# Patient Record
Sex: MALE | Race: WHITE | NOT HISPANIC OR LATINO | URBAN - METROPOLITAN AREA
[De-identification: names, ages, dates, MRNs, and addresses within clinical notes are randomized per-mention and may not be internally consistent; named-entity substitution may affect disease eponyms.]

---

## 2018-03-29 ENCOUNTER — INPATIENT (INPATIENT)
Facility: HOSPITAL | Age: 83
LOS: 7 days | Discharge: HOME HEALTH | DRG: 470 | End: 2018-04-06
Attending: INTERNAL MEDICINE | Admitting: INTERNAL MEDICINE
Payer: MEDICARE

## 2018-03-29 ENCOUNTER — RESULT REVIEW (OUTPATIENT)
Age: 83
End: 2018-03-29

## 2018-03-29 ENCOUNTER — TRANSCRIPTION ENCOUNTER (OUTPATIENT)
Age: 83
End: 2018-03-29

## 2018-03-29 VITALS
SYSTOLIC BLOOD PRESSURE: 188 MMHG | DIASTOLIC BLOOD PRESSURE: 88 MMHG | HEART RATE: 82 BPM | WEIGHT: 130.07 LBS | TEMPERATURE: 98 F | RESPIRATION RATE: 17 BRPM | HEIGHT: 66 IN

## 2018-03-29 DIAGNOSIS — I10 ESSENTIAL (PRIMARY) HYPERTENSION: ICD-10-CM

## 2018-03-29 DIAGNOSIS — S72.009A FRACTURE OF UNSPECIFIED PART OF NECK OF UNSPECIFIED FEMUR, INITIAL ENCOUNTER FOR CLOSED FRACTURE: ICD-10-CM

## 2018-03-29 DIAGNOSIS — E78.5 HYPERLIPIDEMIA, UNSPECIFIED: ICD-10-CM

## 2018-03-29 DIAGNOSIS — I25.10 ATHEROSCLEROTIC HEART DISEASE OF NATIVE CORONARY ARTERY WITHOUT ANGINA PECTORIS: ICD-10-CM

## 2018-03-29 DIAGNOSIS — N18.9 CHRONIC KIDNEY DISEASE, UNSPECIFIED: ICD-10-CM

## 2018-03-29 DIAGNOSIS — Z98.89 OTHER SPECIFIED POSTPROCEDURAL STATES: Chronic | ICD-10-CM

## 2018-03-29 DIAGNOSIS — E11.9 TYPE 2 DIABETES MELLITUS WITHOUT COMPLICATIONS: ICD-10-CM

## 2018-03-29 DIAGNOSIS — Z29.9 ENCOUNTER FOR PROPHYLACTIC MEASURES, UNSPECIFIED: ICD-10-CM

## 2018-03-29 LAB
ALBUMIN SERPL ELPH-MCNC: 3.4 G/DL — LOW (ref 3.5–5)
ALP SERPL-CCNC: 105 U/L — SIGNIFICANT CHANGE UP (ref 40–120)
ALT FLD-CCNC: 13 U/L DA — SIGNIFICANT CHANGE UP (ref 10–60)
ANION GAP SERPL CALC-SCNC: 11 MMOL/L — SIGNIFICANT CHANGE UP (ref 5–17)
APPEARANCE UR: CLEAR — SIGNIFICANT CHANGE UP
APTT BLD: 32.2 SEC — SIGNIFICANT CHANGE UP (ref 27.5–37.4)
AST SERPL-CCNC: 14 U/L — SIGNIFICANT CHANGE UP (ref 10–40)
BILIRUB SERPL-MCNC: 0.4 MG/DL — SIGNIFICANT CHANGE UP (ref 0.2–1.2)
BILIRUB UR-MCNC: NEGATIVE — SIGNIFICANT CHANGE UP
BUN SERPL-MCNC: 62 MG/DL — HIGH (ref 7–18)
CALCIUM SERPL-MCNC: 8.7 MG/DL — SIGNIFICANT CHANGE UP (ref 8.4–10.5)
CHLORIDE SERPL-SCNC: 114 MMOL/L — HIGH (ref 96–108)
CO2 SERPL-SCNC: 16 MMOL/L — LOW (ref 22–31)
COLOR SPEC: YELLOW — SIGNIFICANT CHANGE UP
CREAT SERPL-MCNC: 3.05 MG/DL — HIGH (ref 0.5–1.3)
DIFF PNL FLD: ABNORMAL
GLUCOSE SERPL-MCNC: 88 MG/DL — SIGNIFICANT CHANGE UP (ref 70–99)
GLUCOSE UR QL: NEGATIVE — SIGNIFICANT CHANGE UP
HCT VFR BLD CALC: 32.5 % — LOW (ref 39–50)
HGB BLD-MCNC: 10.8 G/DL — LOW (ref 13–17)
INR BLD: 1.03 RATIO — SIGNIFICANT CHANGE UP (ref 0.88–1.16)
KETONES UR-MCNC: NEGATIVE — SIGNIFICANT CHANGE UP
LEUKOCYTE ESTERASE UR-ACNC: ABNORMAL
LIDOCAIN IGE QN: 56 U/L — LOW (ref 73–393)
LYMPHOCYTES # BLD AUTO: 14 % — SIGNIFICANT CHANGE UP (ref 13–44)
MCHC RBC-ENTMCNC: 31.7 PG — SIGNIFICANT CHANGE UP (ref 27–34)
MCHC RBC-ENTMCNC: 33.2 GM/DL — SIGNIFICANT CHANGE UP (ref 32–36)
MCV RBC AUTO: 95.5 FL — SIGNIFICANT CHANGE UP (ref 80–100)
MONOCYTES NFR BLD AUTO: 3 % — SIGNIFICANT CHANGE UP (ref 2–14)
NEUTROPHILS NFR BLD AUTO: 83 % — HIGH (ref 43–77)
NITRITE UR-MCNC: NEGATIVE — SIGNIFICANT CHANGE UP
OSMOLALITY UR: 447 MOS/KG — SIGNIFICANT CHANGE UP (ref 50–1200)
PH UR: 5 — SIGNIFICANT CHANGE UP (ref 5–8)
PLATELET # BLD AUTO: 127 K/UL — LOW (ref 150–400)
POTASSIUM SERPL-MCNC: 4.5 MMOL/L — SIGNIFICANT CHANGE UP (ref 3.5–5.3)
POTASSIUM SERPL-SCNC: 4.5 MMOL/L — SIGNIFICANT CHANGE UP (ref 3.5–5.3)
PROT ?TM UR-MCNC: 159 MG/DL — HIGH (ref 0–12)
PROT SERPL-MCNC: 7.1 G/DL — SIGNIFICANT CHANGE UP (ref 6–8.3)
PROT UR-MCNC: 100
PROTHROM AB SERPL-ACNC: 11.2 SEC — SIGNIFICANT CHANGE UP (ref 9.8–12.7)
RBC # BLD: 3.4 M/UL — LOW (ref 4.2–5.8)
RBC # FLD: 13.7 % — SIGNIFICANT CHANGE UP (ref 10.3–14.5)
SODIUM SERPL-SCNC: 141 MMOL/L — SIGNIFICANT CHANGE UP (ref 135–145)
SODIUM UR-SCNC: 69 MMOL/L — SIGNIFICANT CHANGE UP (ref 40–220)
SP GR SPEC: 1.01 — SIGNIFICANT CHANGE UP (ref 1.01–1.02)
UROBILINOGEN FLD QL: NEGATIVE — SIGNIFICANT CHANGE UP
WBC # BLD: 9.3 K/UL — SIGNIFICANT CHANGE UP (ref 3.8–10.5)
WBC # FLD AUTO: 9.3 K/UL — SIGNIFICANT CHANGE UP (ref 3.8–10.5)

## 2018-03-29 PROCEDURE — 99285 EMERGENCY DEPT VISIT HI MDM: CPT

## 2018-03-29 PROCEDURE — 88305 TISSUE EXAM BY PATHOLOGIST: CPT | Mod: 26

## 2018-03-29 PROCEDURE — 73502 X-RAY EXAM HIP UNI 2-3 VIEWS: CPT | Mod: 26,RT

## 2018-03-29 PROCEDURE — 88311 DECALCIFY TISSUE: CPT | Mod: 26

## 2018-03-29 PROCEDURE — 71045 X-RAY EXAM CHEST 1 VIEW: CPT | Mod: 26

## 2018-03-29 RX ORDER — MORPHINE SULFATE 50 MG/1
1 CAPSULE, EXTENDED RELEASE ORAL EVERY 4 HOURS
Qty: 0 | Refills: 0 | Status: DISCONTINUED | OUTPATIENT
Start: 2018-03-29 | End: 2018-03-30

## 2018-03-29 RX ORDER — AMLODIPINE BESYLATE 2.5 MG/1
10 TABLET ORAL DAILY
Qty: 0 | Refills: 0 | Status: DISCONTINUED | OUTPATIENT
Start: 2018-03-29 | End: 2018-03-30

## 2018-03-29 RX ORDER — ISOSORBIDE MONONITRATE 60 MG/1
30 TABLET, EXTENDED RELEASE ORAL DAILY
Qty: 0 | Refills: 0 | Status: DISCONTINUED | OUTPATIENT
Start: 2018-03-29 | End: 2018-03-30

## 2018-03-29 RX ORDER — MORPHINE SULFATE 50 MG/1
2 CAPSULE, EXTENDED RELEASE ORAL ONCE
Qty: 0 | Refills: 0 | Status: DISCONTINUED | OUTPATIENT
Start: 2018-03-29 | End: 2018-03-29

## 2018-03-29 RX ORDER — ATORVASTATIN CALCIUM 80 MG/1
40 TABLET, FILM COATED ORAL AT BEDTIME
Qty: 0 | Refills: 0 | Status: DISCONTINUED | OUTPATIENT
Start: 2018-03-29 | End: 2018-03-30

## 2018-03-29 RX ORDER — INSULIN GLARGINE 100 [IU]/ML
20 INJECTION, SOLUTION SUBCUTANEOUS EVERY MORNING
Qty: 0 | Refills: 0 | Status: DISCONTINUED | OUTPATIENT
Start: 2018-03-29 | End: 2018-03-30

## 2018-03-29 RX ORDER — PANTOPRAZOLE SODIUM 20 MG/1
40 TABLET, DELAYED RELEASE ORAL
Qty: 0 | Refills: 0 | Status: DISCONTINUED | OUTPATIENT
Start: 2018-03-29 | End: 2018-03-30

## 2018-03-29 RX ORDER — METOPROLOL TARTRATE 50 MG
25 TABLET ORAL
Qty: 0 | Refills: 0 | Status: DISCONTINUED | OUTPATIENT
Start: 2018-03-29 | End: 2018-03-30

## 2018-03-29 RX ORDER — TAMSULOSIN HYDROCHLORIDE 0.4 MG/1
0.4 CAPSULE ORAL AT BEDTIME
Qty: 0 | Refills: 0 | Status: DISCONTINUED | OUTPATIENT
Start: 2018-03-29 | End: 2018-03-30

## 2018-03-29 RX ORDER — MORPHINE SULFATE 50 MG/1
2 CAPSULE, EXTENDED RELEASE ORAL EVERY 6 HOURS
Qty: 0 | Refills: 0 | Status: DISCONTINUED | OUTPATIENT
Start: 2018-03-29 | End: 2018-03-30

## 2018-03-29 RX ORDER — CITALOPRAM 10 MG/1
20 TABLET, FILM COATED ORAL DAILY
Qty: 0 | Refills: 0 | Status: DISCONTINUED | OUTPATIENT
Start: 2018-03-29 | End: 2018-03-30

## 2018-03-29 RX ORDER — FERROUS SULFATE 325(65) MG
325 TABLET ORAL DAILY
Qty: 0 | Refills: 0 | Status: DISCONTINUED | OUTPATIENT
Start: 2018-03-29 | End: 2018-03-30

## 2018-03-29 RX ORDER — ATENOLOL 25 MG/1
25 TABLET ORAL DAILY
Qty: 0 | Refills: 0 | Status: DISCONTINUED | OUTPATIENT
Start: 2018-03-29 | End: 2018-03-29

## 2018-03-29 RX ORDER — ASPIRIN/CALCIUM CARB/MAGNESIUM 324 MG
81 TABLET ORAL DAILY
Qty: 0 | Refills: 0 | Status: DISCONTINUED | OUTPATIENT
Start: 2018-03-29 | End: 2018-03-30

## 2018-03-29 RX ORDER — RANOLAZINE 500 MG/1
500 TABLET, FILM COATED, EXTENDED RELEASE ORAL
Qty: 0 | Refills: 0 | Status: DISCONTINUED | OUTPATIENT
Start: 2018-03-29 | End: 2018-03-30

## 2018-03-29 RX ORDER — ACETAMINOPHEN 500 MG
650 TABLET ORAL EVERY 6 HOURS
Qty: 0 | Refills: 0 | Status: DISCONTINUED | OUTPATIENT
Start: 2018-03-29 | End: 2018-03-30

## 2018-03-29 RX ORDER — DOCUSATE SODIUM 100 MG
100 CAPSULE ORAL THREE TIMES A DAY
Qty: 0 | Refills: 0 | Status: DISCONTINUED | OUTPATIENT
Start: 2018-03-29 | End: 2018-03-30

## 2018-03-29 RX ORDER — HEPARIN SODIUM 5000 [USP'U]/ML
5000 INJECTION INTRAVENOUS; SUBCUTANEOUS EVERY 12 HOURS
Qty: 0 | Refills: 0 | Status: DISCONTINUED | OUTPATIENT
Start: 2018-03-29 | End: 2018-03-30

## 2018-03-29 RX ORDER — INSULIN LISPRO 100/ML
VIAL (ML) SUBCUTANEOUS
Qty: 0 | Refills: 0 | Status: DISCONTINUED | OUTPATIENT
Start: 2018-03-29 | End: 2018-03-30

## 2018-03-29 RX ORDER — MORPHINE SULFATE 50 MG/1
4 CAPSULE, EXTENDED RELEASE ORAL ONCE
Qty: 0 | Refills: 0 | Status: DISCONTINUED | OUTPATIENT
Start: 2018-03-29 | End: 2018-03-29

## 2018-03-29 RX ORDER — FINASTERIDE 5 MG/1
5 TABLET, FILM COATED ORAL DAILY
Qty: 0 | Refills: 0 | Status: DISCONTINUED | OUTPATIENT
Start: 2018-03-29 | End: 2018-03-30

## 2018-03-29 RX ORDER — RANOLAZINE 500 MG/1
500 TABLET, FILM COATED, EXTENDED RELEASE ORAL
Qty: 0 | Refills: 0 | Status: DISCONTINUED | OUTPATIENT
Start: 2018-03-29 | End: 2018-03-29

## 2018-03-29 RX ADMIN — Medication 25 MILLIGRAM(S): at 18:54

## 2018-03-29 RX ADMIN — TAMSULOSIN HYDROCHLORIDE 0.4 MILLIGRAM(S): 0.4 CAPSULE ORAL at 22:50

## 2018-03-29 RX ADMIN — RANOLAZINE 500 MILLIGRAM(S): 500 TABLET, FILM COATED, EXTENDED RELEASE ORAL at 18:54

## 2018-03-29 RX ADMIN — MORPHINE SULFATE 2 MILLIGRAM(S): 50 CAPSULE, EXTENDED RELEASE ORAL at 10:09

## 2018-03-29 RX ADMIN — MORPHINE SULFATE 2 MILLIGRAM(S): 50 CAPSULE, EXTENDED RELEASE ORAL at 10:39

## 2018-03-29 RX ADMIN — HEPARIN SODIUM 5000 UNIT(S): 5000 INJECTION INTRAVENOUS; SUBCUTANEOUS at 18:54

## 2018-03-29 RX ADMIN — Medication 100 MILLIGRAM(S): at 22:50

## 2018-03-29 RX ADMIN — ATORVASTATIN CALCIUM 40 MILLIGRAM(S): 80 TABLET, FILM COATED ORAL at 22:50

## 2018-03-29 NOTE — ED PROVIDER NOTE - MEDICAL DECISION MAKING DETAILS
89 y/o M pt presents with R hip fracture. Plan for pain control, X-Ray of the R hip, and admit to medicine for orthopedic repair. 89 y/o M pt presents with R hip fracture. Plan for pain control, X-Ray of the R hip, and admit to medicine for medical clearance for orthopedic repair.

## 2018-03-29 NOTE — ED ADULT TRIAGE NOTE - CHIEF COMPLAINT QUOTE
right hip pain, (+) shortening, s/p fall last night. Patient started vomiting in the ambulance as per [place]. Patient on plavix

## 2018-03-29 NOTE — ED PROVIDER NOTE - OBJECTIVE STATEMENT
89 y/o M pt with PMHx of HTN, DM, CAD, L Hip Fracture (s/p repair), and HLD and PSHx of Hip Surgery (performed by Dr. Sosa x2 years ago) presents to ED c/o R hip pain s/p mechanical slip and fall last night (yesterday). Pt states that while he was getting off of the couch last night, pt slipped and fell on his R hip, sustaining an injury to the R hip; pt now suspects a R hip fracture. Pt reports he decided not to come to the hospital until this morning because he did not want to disturb his son last night. Pt notes vomiting since yesterday as well, which pt attributes to having eaten "bad fish". Per pt, pt fractured his L hip x2 years ago, and had it repaired at [facility] by Dr. Sosa. Pt denies head trauma, LOC, numbness, tingling, or any other complaints. NKDA.

## 2018-03-29 NOTE — CONSULT NOTE ADULT - ASSESSMENT
89 yr old male with PMHX of CAD,HTN,Lipid D/O,Anemia,CRI,DM who presents s/p fall with RT hip pain.  1.Echocardiogram.  2.Orthopedics eval.  3.HTN and CAD-asa,lopressor.  4.CRI-F/U lytes.  5.DM-Insulin.  6.Pain control.  7.GI and DVT prophylaxis.

## 2018-03-29 NOTE — CONSULT NOTE ADULT - SUBJECTIVE AND OBJECTIVE BOX
MARCELLA NIEVES  MRN-[id number]     ORTHOPEDIC CONSULT    Diagnosis:  ____ Hip Femoral Neck Fracture    89yMaleHPI:  Patient is a 89M from home, AAOx3, lives with wife, ambulates independently, wife was not at bedside at time of consult for [place]an translation, with PMH HTN, DM, CAD, CABG 7 years ago, L Hip Fracture 8 years ago (s/p repair), and HLD presenting to the ED s/p mechanical fall over couch yesterday night 11pm, falling onto his R hip. Patient states he was not able to get up or ambulate s/p fall due to Right hip pain. He indicates the pain is controlled while he is laying in bed and moving makes the pain worse. Denies fever, chills, SOB, palpitations, chest pain, nausea, vomiting, diarrhea or constipation, only ROS + R hip pain s/p fracture. Denies syncope, LOC or head injury at time of fall.    PMH: as per HPI  Surgical Hx: L hip surgery  Fam Hx: mother and father- DM  Social Hx: neg for smoking or etoh  Allergies: NKDA (29 Mar 2018 14:30)      Patient was seen and evaluated at bedside. Patient with no acute complaints other than right hip pain.   Pain is controlled.  Denies CP/SOB, dyspnea, paresthesias, N/V/D, palpitations.     Vital Signs Last 24 Hrs  T(C): 37.1 (29 Mar 2018 16:24), Max: 37.1 (29 Mar 2018 16:24)  T(F): 98.7 (29 Mar 2018 16:24), Max: 98.7 (29 Mar 2018 16:24)  HR: 74 (29 Mar 2018 16:24) (74 - 82)  BP: 139/54 (29 Mar 2018 16:24) (139/54 - 188/88)  BP(mean): --  RR: 16 (29 Mar 2018 16:24) (16 - 17)  SpO2: 97% (29 Mar 2018 16:24) (97% - 97%)  I&O's Detail      Physical Exam:    General: AAOx3, NAD, resting comfortably in bed.     Hip:   Skin is pink and warm with some ecchymosis. Tender at the fracture site. Decreased ROM of the affected hip due to pain. SILT. Positive logroll test.  Lower extremity:  No calf tenderness, calves are soft. 2+pulses. NVI. 5/5 Strength of EHL/TA/gastrocnemius B/L.  Good capillary refill. Warm, well-perfused.                           10.8   9.3   )-----------( 127      ( 29 Mar 2018 10:06 )             32.5     03-29    141  |  114<H>  |  62<H>  ----------------------------<  88  4.5   |  16<L>  |  3.05<H>    Ca    8.7      29 Mar 2018 10:06    TPro  7.1  /  Alb  3.4<L>  /  TBili  0.4  /  DBili  x   /  AST  14  /  ALT  13  /  AlkPhos  105  03-29    PT/INR - ( 29 Mar 2018 10:06 )   PT: 11.2 sec;   INR: 1.03 ratio         PTT - ( 29 Mar 2018 10:06 )  PTT:32.2 sec      Impression:  89yMale with Right Hip Femoral Neck Fracture     Plan:  -  Pain management  -  Dvt prophylaxis with Venodynes  -  Keep NPO after midnight tonight  -  Surgeon:  Dr. Sosa  -  Procedure:  right hip david arthroplasty  -  For Surgery on 3/30/18  -  Medical Optimization  -  Consent: Pending  -  Case d/w DR. Sosa  -  Fracture care with bedrest now, NWB of the affected extremity MARCELLA NIEVES  MRN-[id number]     ORTHOPEDIC CONSULT    Diagnosis:  Right Hip Femoral Neck Fracture    89yMaleHPI:  Patient is a 89M from home, AAOx3, lives with wife, ambulates independently, wife was not at bedside at time of consult for [place] translation, with PMH HTN, DM, CAD, CABG 7 years ago, L Hip Fracture 8 years ago (s/p repair), and HLD presenting to the ED s/p mechanical fall over couch yesterday night 11pm, falling onto his R hip. Patient states he was not able to get up or ambulate s/p fall due to Right hip pain. He indicates the pain is controlled while he is laying in bed and moving makes the pain worse. Denies fever, chills, SOB, palpitations, chest pain, nausea, vomiting, diarrhea or constipation, only ROS + R hip pain s/p fracture. Denies syncope, LOC or head injury at time of fall.    PMH: as per HPI  Surgical Hx: L hip surgery  Fam Hx: mother and father- DM  Social Hx: neg for smoking or etoh  Allergies: NKDA (29 Mar 2018 14:30)      Patient was seen and evaluated at bedside. Patient with no acute complaints other than right hip pain.   Pain is controlled.  Denies CP/SOB, dyspnea, paresthesias, N/V/D, palpitations.     Vital Signs Last 24 Hrs  T(C): 37.1 (29 Mar 2018 16:24), Max: 37.1 (29 Mar 2018 16:24)  T(F): 98.7 (29 Mar 2018 16:24), Max: 98.7 (29 Mar 2018 16:24)  HR: 74 (29 Mar 2018 16:24) (74 - 82)  BP: 139/54 (29 Mar 2018 16:24) (139/54 - 188/88)  BP(mean): --  RR: 16 (29 Mar 2018 16:24) (16 - 17)  SpO2: 97% (29 Mar 2018 16:24) (97% - 97%)  I&O's Detail      Physical Exam:    General: AAOx3, NAD, resting comfortably in bed.    Right Hip:   Skin is pink and warm with some ecchymosis. Tender at the fracture site. Decreased ROM of the affected hip due to pain. SILT. Positive logroll test.  Lower extremity:  No calf tenderness, calves are soft. 2+pulses. NVI. 5/5 Strength of EHL/TA/gastrocnemius B/L.  Good capillary refill. Warm, well-perfused.                           10.8   9.3   )-----------( 127      ( 29 Mar 2018 10:06 )             32.5     03-29    141  |  114<H>  |  62<H>  ----------------------------<  88  4.5   |  16<L>  |  3.05<H>    Ca    8.7      29 Mar 2018 10:06    TPro  7.1  /  Alb  3.4<L>  /  TBili  0.4  /  DBili  x   /  AST  14  /  ALT  13  /  AlkPhos  105  03-29    PT/INR - ( 29 Mar 2018 10:06 )   PT: 11.2 sec;   INR: 1.03 ratio         PTT - ( 29 Mar 2018 10:06 )  PTT:32.2 sec    < from: Xray Hip w/ Pelvis 2 or 3 Views, Right (03.29.18 @ 10:56) >    EXAM:  XR HIP WITH PELV 2-3V RT                            PROCEDURE DATE:  03/29/2018          INTERPRETATION:  X-rays of the right hip and pelvis    Indication: Right hip pain from a fall.    2 views of the right hip and single AP view of the pelvis are compared to   a previous examination dated 12/27/2015.    Impression: New acute right femoral neck fracture with mild angulation.   This finding is communicated with the emergency department via the PACS   communication system.    No dislocation.    Left hip screw/pins are present.    The hip joint spaces are preserved bilaterally.                ROMIE BROCK M.D., ATTENDING RADIOLOGIST  This document has been electronically signed. Mar 29 2018 11:00AM                < end of copied text >      Impression:  89yMale with Right Hip Femoral Neck Fracture     Plan:  -  Pain management  -  Dvt prophylaxis with Venodynes. On hep sq for dvt.  -  Keep NPO after midnight tonight  -  Surgeon:  Dr. Sosa  -  Procedure:  right hip david arthroplasty  -  For Surgery on 3/30/18  -  Medical Optimization  -  Consent: Pending  -  Case d/w DR. Sosa  -  Fracture care with bedrest now, NWB of the affected extremity

## 2018-03-29 NOTE — CONSULT NOTE ADULT - SUBJECTIVE AND OBJECTIVE BOX
HPI:  Patient is a 89M from home, AAOx3, lives with wife, ambulates independently, wife at bedside for [place] translation, with PMH HTN, DM, CAD, CABG 7 years ago, L Hip Fracture 8 years ago (s/p repair), and HLD presenting to the ED s/p mechanical fall over couch yesterday night 11pm, falling onto his R hip. Denies fever, chills, SOB, palpitations, chest pain, nausea, vomiting, diarrhea or constipation, only ROS + R hip pain s/p fracture. Denies syncope, LOC or head injury at time of fall.  h/o diabetes on glimepiride/lantus  ?wt change  ?hypoglycemia  PMH: as per HPI  Surgical Hx: L hip surgery  Fam Hx: mother and father- DM  Social Hx: neg for smoking or etoh  Allergies: NKDA (29 Mar 2018 14:30)      PAST MEDICAL & SURGICAL HISTORY:  HLD (hyperlipidemia)  Hip fracture, left  CAD (coronary artery disease): s/p by pass surgery  Diabetes  Hypertension  History of hip surgery      No Known Allergies      acetaminophen   Tablet. 650 milliGRAM(s) Oral every 6 hours PRN  amLODIPine   Tablet 10 milliGRAM(s) Oral daily  aspirin enteric coated 81 milliGRAM(s) Oral daily  atorvastatin 40 milliGRAM(s) Oral at bedtime  citalopram 20 milliGRAM(s) Oral daily  docusate sodium 100 milliGRAM(s) Oral three times a day  ferrous    sulfate 325 milliGRAM(s) Oral daily  finasteride 5 milliGRAM(s) Oral daily  heparin  Injectable 5000 Unit(s) SubCutaneous every 12 hours  insulin glargine Injectable (LANTUS) 20 Unit(s) SubCutaneous every morning  isosorbide   mononitrate ER Tablet (IMDUR) 30 milliGRAM(s) Oral daily  metoprolol tartrate 25 milliGRAM(s) Oral two times a day  morphine  - Injectable 1 milliGRAM(s) IV Push every 4 hours PRN  morphine  - Injectable 2 milliGRAM(s) IV Push every 6 hours PRN  pantoprazole    Tablet 40 milliGRAM(s) Oral before breakfast  ranolazine 500 milliGRAM(s) Oral two times a day  tamsulosin 0.4 milliGRAM(s) Oral at bedtime      Social Hx:    FAMILY HISTORY:  No pertinent family history in first degree relatives      REVIEW OF SYSTEMS:  cannot get details  preet diet  no vomiting  CONSTITUTIONAL: No weakness, fevers or chills  EYES/ENT: No visual changes;  No vertigo or throat pain   NECK: No pain or stiffness  RESPIRATORY: No cough, wheezing, hemoptysis; No shortness of breath  CARDIOVASCULAR: No chest pain or palpitations  GASTROINTESTINAL: No abdominal or epigastric pain. No nausea, vomiting, or hematemesis; No diarrhea or constipation. No melena or hematochezia.  GENITOURINARY: No dysuria, frequency or hematuria  NEUROLOGICAL: No numbness or weakness  SKIN: No itching, burning, rashes, or lesions   All other review of systems is negative unless indicated above.  PHYSICAL EXAM:    Vital Signs Last 24 Hrs  T(C): 37.1 (29 Mar 2018 16:24), Max: 37.1 (29 Mar 2018 16:24)  T(F): 98.7 (29 Mar 2018 16:24), Max: 98.7 (29 Mar 2018 16:24)  HR: 74 (29 Mar 2018 16:24) (74 - 82)  BP: 139/54 (29 Mar 2018 16:24) (139/54 - 188/88)  BP(mean): --  RR: 16 (29 Mar 2018 16:24) (16 - 17)  SpO2: 97% (29 Mar 2018 16:24) (97% - 97%)    Constitutional:    HEENT:elderly male  awake  lungs ae fair  cardia reg  abd soft    Neck:  [  ] Supple  [  ]Lymphadenopathy  [  ] JVD   [  ]No JVD  [  ] Masses   [  ] WNL    CHEST/Respiratory:  [  ] Rales      [  ] Rhonchi      [  ] Wheezing       [  ] Chest Tenderness  [  ]Clear to auscultation    Cardiovascular:  [  ]S1 S2  [  ] Reg  [  ] Irreg   [  ] Murmurs  [  ]Systolic [  ]Diastolic  [  ]No Murmur    Abdomen:  [  ]  Bowel Sounds   [  ] Soft           [  ] ABD Distention  [  ] Tenderness  [  ] Organomegaly                        [  ] Guarding Rigidity  [  ] No Guarding Rigidity  [  ] Rebound Tenderness [  ] No Rebound Tenderness    Extremities: [  ] Edema  [  ] No edema  [  ] Clubbing   [  ] Cyanosis  [  ] Palpable peripheral pulses                        [  ] No Tender Calf muscles  [  ] Tender Calf muscles    Neurological:  [  ] Alert  [  ] Awake  [  ] Oriented  x                              [  ] Confused    Skin:  [  ] Thrombophlebitis  [  ] Rashes  [  ] Dry  [  ] Ulcers    Ortho:  [  ] Joint Swelling  [  ] Joint erythema [  ] DJD [  ] Increased temp. to touch      LABS/DIAGNOSTIC TESTS                          10.8   9.3   )-----------( 127      ( 29 Mar 2018 10:06 )             32.5     WBC Count: 9.3 K/uL (03-29 @ 10:06)      03-29    141  |  114<H>  |  62<H>  ----------------------------<  88  4.5   |  16<L>  |  3.05<H>    Ca    8.7      29 Mar 2018 10:06    TPro  7.1  /  Alb  3.4<L>  /  TBili  0.4  /  DBili  x   /  AST  14  /  ALT  13  /  AlkPhos  105  03-29          LIVER FUNCTIONS - ( 29 Mar 2018 10:06 )  Alb: 3.4 g/dL / Pro: 7.1 g/dL / ALK PHOS: 105 U/L / ALT: 13 U/L DA / AST: 14 U/L / GGT: x             PT/INR - ( 29 Mar 2018 10:06 )   PT: 11.2 sec;   INR: 1.03 ratio         PTT - ( 29 Mar 2018 10:06 )  PTT:32.2 sec    LACTATE:      CULTURES:   acetaminophen   Tablet. 650 milliGRAM(s) Oral every 6 hours PRN  amLODIPine   Tablet 10 milliGRAM(s) Oral daily  aspirin enteric coated 81 milliGRAM(s) Oral daily  atorvastatin 40 milliGRAM(s) Oral at bedtime  citalopram 20 milliGRAM(s) Oral daily  docusate sodium 100 milliGRAM(s) Oral three times a day  ferrous    sulfate 325 milliGRAM(s) Oral daily  finasteride 5 milliGRAM(s) Oral daily  heparin  Injectable 5000 Unit(s) SubCutaneous every 12 hours  insulin glargine Injectable (LANTUS) 20 Unit(s) SubCutaneous every morning  isosorbide   mononitrate ER Tablet (IMDUR) 30 milliGRAM(s) Oral daily  metoprolol tartrate 25 milliGRAM(s) Oral two times a day  morphine  - Injectable 1 milliGRAM(s) IV Push every 4 hours PRN  morphine  - Injectable 2 milliGRAM(s) IV Push every 6 hours PRN  pantoprazole    Tablet 40 milliGRAM(s) Oral before breakfast  ranolazine 500 milliGRAM(s) Oral two times a day  tamsulosin 0.4 milliGRAM(s) Oral at bedtime      RADIOLOGY    CXR:

## 2018-03-29 NOTE — ED ADULT NURSE NOTE - LANGUAGE ASSISTANCE NEEDED
Yes-Patient/Caregiver accepts free interpretation services.../son at [facility]

## 2018-03-29 NOTE — H&P ADULT - PROBLEM SELECTOR PLAN 5
f/u HbA1C  holding glimeperide  HSS  monitor FS ac hs  diabetic diet f/u HbA1C  holding glimeperide  reducing AM lantus 40 units --> 20 units while inpatient  HSS  monitor FS ac hs  diabetic diet  Dr. Allen- endo

## 2018-03-29 NOTE — H&P ADULT - PROBLEM SELECTOR PLAN 7
IMPROVE VTE Individual Risk Assessment          RISK                                                          Points  [  ] Previous VTE                                                3  [  ] Thrombophilia                                             2  [  ] Lower limb paralysis                                   2        (unable to hold up >15 seconds)    [  ] Current Cancer                                             2         (within 6 months)  [ x ] Immobilization > 24 hrs                              1  [  ] ICU/CCU stay > 24 hours                             1  [x  ] Age > 60                                                         1    IMPROVE VTE Score: 2, heparin sq for dvt ppx

## 2018-03-29 NOTE — H&P ADULT - PROBLEM SELECTOR PLAN 2
Hx CABG 7 years ago  holding plavix as pre-op  resuming asa 81 and ranexa 500mg BID  cardio eval- Dr. Johnson  f/u echo

## 2018-03-29 NOTE — H&P ADULT - PROBLEM SELECTOR PLAN 1
s/p mechanical fall yesterday night  R hip X ray shows acute right femoral neck fracture with mild angulation.  ortho eval: Dr. Sosa  medical clearance needed  CXR clear  Dr. Johnson- cardio  f/u echo  pain management  PT eval post-op

## 2018-03-29 NOTE — H&P ADULT - NSHPLABSRESULTS_GEN_ALL_CORE
LABS:                        10.8   9.3   )-----------( 127      ( 29 Mar 2018 10:06 )             32.5     03-29    141  |  114<H>  |  62<H>  ----------------------------<  88  4.5   |  16<L>  |  3.05<H>    Ca    8.7      29 Mar 2018 10:06    TPro  7.1  /  Alb  3.4<L>  /  TBili  0.4  /  DBili  x   /  AST  14  /  ALT  13  /  AlkPhos  105  03-29    PT/INR - ( 29 Mar 2018 10:06 )   PT: 11.2 sec;   INR: 1.03 ratio      PTT - ( 29 Mar 2018 10:06 )  PTT:32.2 sec    < from: Xray Chest 1 View AP/PA (03.29.18 @ 10:56) >    Impression: No evidence for pulmonary consolidation, pleural effusion or   pneumothorax.    The trachea is midline.    The cardiac silhouette is magnified by AP technique.    Stable median sternotomy wires.     No gross acute bony fracture is identified.    < end of copied text >    < from: Xray Hip w/ Pelvis 2 or 3 Views, Right (03.29.18 @ 10:56) >    Impression: New acute right femoral neck fracture with mild angulation.   This finding is communicated with the emergency department via the PACS   communication system.    No dislocation.    Left hip screw/pins are present.    The hip joint spaces are preserved bilaterally.    < end of copied text >

## 2018-03-29 NOTE — H&P ADULT - HISTORY OF PRESENT ILLNESS
Patient is a 89M from home, AAOx3, lives with wife, ambulates independently, wife at bedside for French translation, with PMH HTN, DM, CAD, CABG 7 years ago, L Hip Fracture 8 years ago (s/p repair), and HLD presenting to the ED s/p mechanical fall over couch yesterday night 11pm, falling onto his R hip. Denies fever, chills, SOB, palpitations, chest pain, nausea, vomiting, diarrhea or constipation, only ROS + R hip pain s/p fracture. Denies syncope, LOC or head injury at time of fall.    PMH: as per HPI  Surgical Hx: L hip surgery  Fam Hx: mother and father- DM  Social Hx: neg for smoking or etoh  Allergies: NKDA

## 2018-03-29 NOTE — CONSULT NOTE ADULT - SUBJECTIVE AND OBJECTIVE BOX
CHIEF COMPLAINT:Patient is a 89y old  Male who presents with a chief complaint of fall,Rt hip pain.    HPI:Pt is a 89 yr old male with PMHX of CAD,HTN,Lipid D/O,Anemia,CRI,DM who presents s/p fall with RT hip pain. Pt denies any loc,cp,sob or palpitations.      PAST MEDICAL & SURGICAL HISTORY:  HLD (hyperlipidemia)  CAD (coronary artery disease): s/p by pass surgery  Diabetes  Hypertension  History of LT hip surgery  CRI      MEDICATIONS  (STANDING):  morphine  - Injectable 4 milliGRAM(s) IV Push once    MEDICATIONS  (PRN):      FAMILY HISTORY: Non-contributory      SOCIAL HISTORY:    [ x] Non-smoker    [x ] Alcohol-denies    Allergies    No Known Allergies    Intolerances    	    REVIEW OF SYSTEMS:  CONSTITUTIONAL: No fever, weight loss, or fatigue  EYES: No eye pain, visual disturbances, or discharge  ENT:  No difficulty hearing, tinnitus, vertigo; No sinus or throat pain  NECK: No pain or stiffness  RESPIRATORY: No cough, wheezing, chills or hemoptysis; No Shortness of Breath  CARDIOVASCULAR: No chest pain, palpitations, passing out, dizziness, or leg swelling  GASTROINTESTINAL: No abdominal or epigastric pain. No nausea, vomiting, or hematemesis; No diarrhea or constipation. No melena or hematochezia.  GENITOURINARY: No dysuria, frequency, hematuria, or incontinence  NEUROLOGICAL: No headaches, memory loss, loss of strength, numbness, or tremors  SKIN: No itching, burning, rashes, or lesions   LYMPH Nodes: No enlarged glands  ENDOCRINE: No heat or cold intolerance; No hair loss  MUSCULOSKELETAL: No joint pain or swelling; No muscle, back, + extremity pain  PSYCHIATRIC: No depression, anxiety, mood swings, or difficulty sleeping  HEME/LYMPH: No easy bruising, or bleeding gums  ALLERGY AND IMMUNOLOGIC: No hives or eczema	      PHYSICAL EXAM:  T(C): 36.6 (03-29-18 @ 08:43), Max: 36.6 (03-29-18 @ 08:43)  HR: 82 (03-29-18 @ 08:43) (82 - 82)  BP: 188/88 (03-29-18 @ 08:43) (188/88 - 188/88)  RR: 17 (03-29-18 @ 08:43) (17 - 17)      Appearance: Normal	  HEENT:   Normal oral mucosa, PERRL, EOMI	  Lymphatic: No lymphadenopathy  Cardiovascular: Normal S1 S2, No JVD, No murmurs, No edema  Respiratory: Lungs clear to auscultation	  Psychiatry: A & O x 3, Mood & affect appropriate  Gastrointestinal:  Soft, Non-tender, + BS	  Skin: No rashes, No ecchymoses, No cyanosis	  Neurologic: Non-focal  Extremities: Normal range of motion, No clubbing, cyanosis or edema  Vascular: Peripheral pulses palpable 2+ bilaterally        ECG:  	not in chart    	  LABS:	 	                        10.8   9.3   )-----------( 127      ( 29 Mar 2018 10:06 )             32.5     03-29    141  |  114<H>  |  62<H>  ----------------------------<  88  4.5   |  16<L>  |  3.05<H>    Ca    8.7      29 Mar 2018 10:06    TPro  7.1  /  Alb  3.4<L>  /  TBili  0.4  /  DBili  x   /  AST  14  /  ALT  13  /  AlkPhos  105  03-29      EXAM:  XR HIP WITH PELV 2-3V RT                            PROCEDURE DATE:  03/29/2018          INTERPRETATION:  X-rays of the right hip and pelvis    Indication: Right hip pain from a fall.    2 views of the right hip and single AP view of the pelvis are compared to   a previous examination dated 12/27/2015.    Impression: New acute right femoral neck fracture with mild angulation.   This finding is communicated with the emergency department via the PACS   communication system.    No dislocation.    Left hip screw/pins are present.    The hip joint spaces are preserved bilaterally.    EXAM:  XR CHEST AP OR PA 1V                            PROCEDURE DATE:  03/29/2018          INTERPRETATION:  Portable chest x-ray    Indication: Trauma    Portable chest x-ray is acquired without a previous examination for   comparison.    Impression: No evidence for pulmonary consolidation, pleural effusion or   pneumothorax.    The trachea is midline.    The cardiac silhouette is magnified by AP technique.    Stable median sternotomy wires.     No gross acute bony fracture is identified.

## 2018-03-29 NOTE — H&P ADULT - ASSESSMENT
Patient is a 89M from home, AAOx3, lives with wife, ambulates independently, wife at bedside for Malagasy translation, with PMH HTN, DM, CAD, CABG 7 years ago, L Hip Fracture 8 years ago (s/p repair), and HLD presenting to the ED s/p mechanical fall over couch yesterday night 11pm. Admitted for acute right femoral neck fracture with mild angulation.

## 2018-03-29 NOTE — CONSULT NOTE ADULT - ASSESSMENT
-CLINICALLY RT HIP FX AFTER MECHANICAL FALL  -HX; CAD/CABG/HTN/HLD/DM  - LEFT HIP REPAIR X 2 IN PAST    PLAN; ORTHO EVAL-DR. DUMONT           CARDIO CLEARANCE -DR. CARA LOPEZ; DR. HERNANDEZ

## 2018-03-29 NOTE — ED PROVIDER NOTE - PMH
CAD (coronary artery disease)  s/p by pass surgery  Diabetes    Hip fracture, left    HLD (hyperlipidemia)    Hypertension

## 2018-03-29 NOTE — H&P ADULT - NSHPPHYSICALEXAM_GEN_ALL_CORE
INTERVAL HPI/OVERNIGHT EVENTS:  T(C): 36.6 (03-29-18 @ 08:43), Max: 36.6 (03-29-18 @ 08:43)  HR: 82 (03-29-18 @ 08:43) (82 - 82)  BP: 188/88 (03-29-18 @ 08:43) (188/88 - 188/88)  RR: 17 (03-29-18 @ 08:43) (17 - 17)    PHYSICAL EXAM:  GENERAL: NAD, well-groomed, well-developed  HEAD:  Atraumatic, Normocephalic  EYES: EOMI, PERRLA, conjunctiva and sclera clear  ENMT: No tonsillar erythema, exudates, or enlargement; Moist mucous membranes  NECK: Supple, No JVD, Normal thyroid  NERVOUS SYSTEM:  Alert & Oriented X3, limited ROM R hip 2/2 fracture, DTRs 2+ intact and symmetric  CHEST/LUNG: Clear to percussion bilaterally; No rales, rhonchi, wheezing, or rubs  HEART: Regular rate and rhythm; No murmurs, rubs, or gallops  ABDOMEN: Soft, Nontender, Nondistended; Bowel sounds present  EXTREMITIES:  2+ Peripheral Pulses, No clubbing, cyanosis, or edema  LYMPH: No lymphadenopathy noted  SKIN: No rashes or lesions

## 2018-03-29 NOTE — CONSULT NOTE ADULT - ASSESSMENT
1.diabetes mellitus  admitted s/p fall/hip fx  initial sugar low normal  with ckd  d/c glimepiride  reduce lantus 20  pt for surgery in am  if sugar less than 100 start iv dextrose  avoid tight control  reassess

## 2018-03-30 LAB
24R-OH-CALCIDIOL SERPL-MCNC: 9.2 NG/ML — LOW (ref 30–80)
ALBUMIN SERPL ELPH-MCNC: 2.9 G/DL — LOW (ref 3.5–5)
ALP SERPL-CCNC: 100 U/L — SIGNIFICANT CHANGE UP (ref 40–120)
ALT FLD-CCNC: 12 U/L DA — SIGNIFICANT CHANGE UP (ref 10–60)
ANION GAP SERPL CALC-SCNC: 11 MMOL/L — SIGNIFICANT CHANGE UP (ref 5–17)
ANION GAP SERPL CALC-SCNC: 12 MMOL/L — SIGNIFICANT CHANGE UP (ref 5–17)
AST SERPL-CCNC: 14 U/L — SIGNIFICANT CHANGE UP (ref 10–40)
BASOPHILS # BLD AUTO: 0.1 K/UL — SIGNIFICANT CHANGE UP (ref 0–0.2)
BASOPHILS NFR BLD AUTO: 0.7 % — SIGNIFICANT CHANGE UP (ref 0–2)
BILIRUB SERPL-MCNC: 0.4 MG/DL — SIGNIFICANT CHANGE UP (ref 0.2–1.2)
BUN SERPL-MCNC: 63 MG/DL — HIGH (ref 7–18)
BUN SERPL-MCNC: 69 MG/DL — HIGH (ref 7–18)
CALCIUM SERPL-MCNC: 8.2 MG/DL — LOW (ref 8.4–10.5)
CALCIUM SERPL-MCNC: 8.6 MG/DL — SIGNIFICANT CHANGE UP (ref 8.4–10.5)
CHLORIDE SERPL-SCNC: 114 MMOL/L — HIGH (ref 96–108)
CHLORIDE SERPL-SCNC: 116 MMOL/L — HIGH (ref 96–108)
CHOLEST SERPL-MCNC: 120 MG/DL — SIGNIFICANT CHANGE UP (ref 10–199)
CO2 SERPL-SCNC: 14 MMOL/L — LOW (ref 22–31)
CO2 SERPL-SCNC: 15 MMOL/L — LOW (ref 22–31)
CREAT SERPL-MCNC: 3.06 MG/DL — HIGH (ref 0.5–1.3)
CREAT SERPL-MCNC: 3.11 MG/DL — HIGH (ref 0.5–1.3)
EOSINOPHIL # BLD AUTO: 0.1 K/UL — SIGNIFICANT CHANGE UP (ref 0–0.5)
EOSINOPHIL NFR BLD AUTO: 2 % — SIGNIFICANT CHANGE UP (ref 0–6)
FERRITIN SERPL-MCNC: 197 NG/ML — SIGNIFICANT CHANGE UP (ref 30–400)
FOLATE SERPL-MCNC: 7.2 NG/ML — SIGNIFICANT CHANGE UP (ref 4.8–24.2)
GLUCOSE SERPL-MCNC: 85 MG/DL — SIGNIFICANT CHANGE UP (ref 70–99)
GLUCOSE SERPL-MCNC: 97 MG/DL — SIGNIFICANT CHANGE UP (ref 70–99)
HCT VFR BLD CALC: 29 % — LOW (ref 39–50)
HCT VFR BLD CALC: 29.6 % — LOW (ref 39–50)
HDLC SERPL-MCNC: 55 MG/DL — SIGNIFICANT CHANGE UP (ref 40–125)
HGB BLD-MCNC: 9.3 G/DL — LOW (ref 13–17)
HGB BLD-MCNC: 9.9 G/DL — LOW (ref 13–17)
INR BLD: 1.08 RATIO — SIGNIFICANT CHANGE UP (ref 0.88–1.16)
IRON SATN MFR SERPL: 14 % — LOW (ref 20–55)
IRON SATN MFR SERPL: 30 UG/DL — LOW (ref 65–170)
LIPID PNL WITH DIRECT LDL SERPL: 39 MG/DL — SIGNIFICANT CHANGE UP
LYMPHOCYTES # BLD AUTO: 1.5 K/UL — SIGNIFICANT CHANGE UP (ref 1–3.3)
LYMPHOCYTES # BLD AUTO: 21.3 % — SIGNIFICANT CHANGE UP (ref 13–44)
MAGNESIUM SERPL-MCNC: 2.1 MG/DL — SIGNIFICANT CHANGE UP (ref 1.6–2.6)
MCHC RBC-ENTMCNC: 31.5 PG — SIGNIFICANT CHANGE UP (ref 27–34)
MCHC RBC-ENTMCNC: 32.2 GM/DL — SIGNIFICANT CHANGE UP (ref 32–36)
MCHC RBC-ENTMCNC: 32.6 PG — SIGNIFICANT CHANGE UP (ref 27–34)
MCHC RBC-ENTMCNC: 33.5 GM/DL — SIGNIFICANT CHANGE UP (ref 32–36)
MCV RBC AUTO: 97.3 FL — SIGNIFICANT CHANGE UP (ref 80–100)
MCV RBC AUTO: 97.7 FL — SIGNIFICANT CHANGE UP (ref 80–100)
MONOCYTES # BLD AUTO: 0.6 K/UL — SIGNIFICANT CHANGE UP (ref 0–0.9)
MONOCYTES NFR BLD AUTO: 8 % — SIGNIFICANT CHANGE UP (ref 2–14)
NEUTROPHILS # BLD AUTO: 4.8 K/UL — SIGNIFICANT CHANGE UP (ref 1.8–7.4)
NEUTROPHILS NFR BLD AUTO: 68 % — SIGNIFICANT CHANGE UP (ref 43–77)
PHOSPHATE SERPL-MCNC: 4.6 MG/DL — HIGH (ref 2.5–4.5)
PLATELET # BLD AUTO: 117 K/UL — LOW (ref 150–400)
PLATELET # BLD AUTO: 99 K/UL — LOW (ref 150–400)
POTASSIUM SERPL-MCNC: 4.6 MMOL/L — SIGNIFICANT CHANGE UP (ref 3.5–5.3)
POTASSIUM SERPL-MCNC: 4.8 MMOL/L — SIGNIFICANT CHANGE UP (ref 3.5–5.3)
POTASSIUM SERPL-SCNC: 4.6 MMOL/L — SIGNIFICANT CHANGE UP (ref 3.5–5.3)
POTASSIUM SERPL-SCNC: 4.8 MMOL/L — SIGNIFICANT CHANGE UP (ref 3.5–5.3)
PROT SERPL-MCNC: 6.4 G/DL — SIGNIFICANT CHANGE UP (ref 6–8.3)
PROTHROM AB SERPL-ACNC: 11.8 SEC — SIGNIFICANT CHANGE UP (ref 9.8–12.7)
RBC # BLD: 2.96 M/UL — LOW (ref 4.2–5.8)
RBC # BLD: 3.04 M/UL — LOW (ref 4.2–5.8)
RBC # FLD: 13.8 % — SIGNIFICANT CHANGE UP (ref 10.3–14.5)
RBC # FLD: 14.1 % — SIGNIFICANT CHANGE UP (ref 10.3–14.5)
SODIUM SERPL-SCNC: 141 MMOL/L — SIGNIFICANT CHANGE UP (ref 135–145)
SODIUM SERPL-SCNC: 141 MMOL/L — SIGNIFICANT CHANGE UP (ref 135–145)
TIBC SERPL-MCNC: 217 UG/DL — LOW (ref 250–450)
TOTAL CHOLESTEROL/HDL RATIO MEASUREMENT: 2.2 RATIO — LOW (ref 3.4–9.6)
TRANSFERRIN SERPL-MCNC: 159 MG/DL — LOW (ref 200–360)
TRIGL SERPL-MCNC: 130 MG/DL — SIGNIFICANT CHANGE UP (ref 10–149)
TSH SERPL-MCNC: 2.77 UU/ML — SIGNIFICANT CHANGE UP (ref 0.34–4.82)
UIBC SERPL-MCNC: 187 UG/DL — SIGNIFICANT CHANGE UP (ref 110–370)
VIT B12 SERPL-MCNC: 566 PG/ML — SIGNIFICANT CHANGE UP (ref 232–1245)
WBC # BLD: 7 K/UL — SIGNIFICANT CHANGE UP (ref 3.8–10.5)
WBC # BLD: 7.4 K/UL — SIGNIFICANT CHANGE UP (ref 3.8–10.5)
WBC # FLD AUTO: 7 K/UL — SIGNIFICANT CHANGE UP (ref 3.8–10.5)
WBC # FLD AUTO: 7.4 K/UL — SIGNIFICANT CHANGE UP (ref 3.8–10.5)

## 2018-03-30 PROCEDURE — 73501 X-RAY EXAM HIP UNI 1 VIEW: CPT | Mod: 26,RT

## 2018-03-30 RX ORDER — TAMSULOSIN HYDROCHLORIDE 0.4 MG/1
0.4 CAPSULE ORAL AT BEDTIME
Qty: 0 | Refills: 0 | Status: DISCONTINUED | OUTPATIENT
Start: 2018-03-30 | End: 2018-04-06

## 2018-03-30 RX ORDER — SODIUM CHLORIDE 9 MG/ML
1000 INJECTION, SOLUTION INTRAVENOUS
Qty: 0 | Refills: 0 | Status: DISCONTINUED | OUTPATIENT
Start: 2018-03-30 | End: 2018-04-01

## 2018-03-30 RX ORDER — MORPHINE SULFATE 50 MG/1
2 CAPSULE, EXTENDED RELEASE ORAL EVERY 6 HOURS
Qty: 0 | Refills: 0 | Status: DISCONTINUED | OUTPATIENT
Start: 2018-03-30 | End: 2018-03-31

## 2018-03-30 RX ORDER — SODIUM CHLORIDE 9 MG/ML
1000 INJECTION, SOLUTION INTRAVENOUS
Qty: 0 | Refills: 0 | Status: DISCONTINUED | OUTPATIENT
Start: 2018-03-30 | End: 2018-03-30

## 2018-03-30 RX ORDER — ISOSORBIDE MONONITRATE 60 MG/1
30 TABLET, EXTENDED RELEASE ORAL DAILY
Qty: 0 | Refills: 0 | Status: DISCONTINUED | OUTPATIENT
Start: 2018-03-30 | End: 2018-04-06

## 2018-03-30 RX ORDER — ACETAMINOPHEN 500 MG
650 TABLET ORAL EVERY 6 HOURS
Qty: 0 | Refills: 0 | Status: DISCONTINUED | OUTPATIENT
Start: 2018-03-30 | End: 2018-04-06

## 2018-03-30 RX ORDER — LABETALOL HCL 100 MG
5 TABLET ORAL ONCE
Qty: 0 | Refills: 0 | Status: COMPLETED | OUTPATIENT
Start: 2018-03-30 | End: 2018-03-30

## 2018-03-30 RX ORDER — METOPROLOL TARTRATE 50 MG
25 TABLET ORAL
Qty: 0 | Refills: 0 | Status: DISCONTINUED | OUTPATIENT
Start: 2018-03-30 | End: 2018-04-06

## 2018-03-30 RX ORDER — ONDANSETRON 8 MG/1
4 TABLET, FILM COATED ORAL EVERY 6 HOURS
Qty: 0 | Refills: 0 | Status: DISCONTINUED | OUTPATIENT
Start: 2018-03-30 | End: 2018-04-06

## 2018-03-30 RX ORDER — IRON SUCROSE 20 MG/ML
200 INJECTION, SOLUTION INTRAVENOUS DAILY
Qty: 0 | Refills: 0 | Status: DISCONTINUED | OUTPATIENT
Start: 2018-03-30 | End: 2018-03-30

## 2018-03-30 RX ORDER — INSULIN LISPRO 100/ML
VIAL (ML) SUBCUTANEOUS
Qty: 0 | Refills: 0 | Status: DISCONTINUED | OUTPATIENT
Start: 2018-03-30 | End: 2018-04-02

## 2018-03-30 RX ORDER — CEFAZOLIN SODIUM 1 G
1000 VIAL (EA) INJECTION EVERY 8 HOURS
Qty: 0 | Refills: 0 | Status: COMPLETED | OUTPATIENT
Start: 2018-03-30 | End: 2018-03-31

## 2018-03-30 RX ORDER — CITALOPRAM 10 MG/1
20 TABLET, FILM COATED ORAL DAILY
Qty: 0 | Refills: 0 | Status: DISCONTINUED | OUTPATIENT
Start: 2018-03-30 | End: 2018-04-06

## 2018-03-30 RX ORDER — AMLODIPINE BESYLATE 2.5 MG/1
10 TABLET ORAL DAILY
Qty: 0 | Refills: 0 | Status: DISCONTINUED | OUTPATIENT
Start: 2018-03-30 | End: 2018-04-01

## 2018-03-30 RX ORDER — FINASTERIDE 5 MG/1
5 TABLET, FILM COATED ORAL DAILY
Qty: 0 | Refills: 0 | Status: DISCONTINUED | OUTPATIENT
Start: 2018-03-30 | End: 2018-04-06

## 2018-03-30 RX ORDER — HYDROMORPHONE HYDROCHLORIDE 2 MG/ML
0.3 INJECTION INTRAMUSCULAR; INTRAVENOUS; SUBCUTANEOUS
Qty: 0 | Refills: 0 | Status: DISCONTINUED | OUTPATIENT
Start: 2018-03-30 | End: 2018-03-30

## 2018-03-30 RX ORDER — ATORVASTATIN CALCIUM 80 MG/1
40 TABLET, FILM COATED ORAL AT BEDTIME
Qty: 0 | Refills: 0 | Status: DISCONTINUED | OUTPATIENT
Start: 2018-03-30 | End: 2018-04-06

## 2018-03-30 RX ORDER — HEPARIN SODIUM 5000 [USP'U]/ML
5000 INJECTION INTRAVENOUS; SUBCUTANEOUS EVERY 12 HOURS
Qty: 0 | Refills: 0 | Status: DISCONTINUED | OUTPATIENT
Start: 2018-03-31 | End: 2018-04-06

## 2018-03-30 RX ORDER — INSULIN GLARGINE 100 [IU]/ML
20 INJECTION, SOLUTION SUBCUTANEOUS EVERY MORNING
Qty: 0 | Refills: 0 | Status: DISCONTINUED | OUTPATIENT
Start: 2018-03-30 | End: 2018-04-01

## 2018-03-30 RX ORDER — MAGNESIUM HYDROXIDE 400 MG/1
30 TABLET, CHEWABLE ORAL DAILY
Qty: 0 | Refills: 0 | Status: DISCONTINUED | OUTPATIENT
Start: 2018-03-30 | End: 2018-04-06

## 2018-03-30 RX ORDER — RANOLAZINE 500 MG/1
500 TABLET, FILM COATED, EXTENDED RELEASE ORAL
Qty: 0 | Refills: 0 | Status: DISCONTINUED | OUTPATIENT
Start: 2018-03-30 | End: 2018-04-06

## 2018-03-30 RX ADMIN — MORPHINE SULFATE 1 MILLIGRAM(S): 50 CAPSULE, EXTENDED RELEASE ORAL at 10:12

## 2018-03-30 RX ADMIN — Medication 5 MILLIGRAM(S): at 15:55

## 2018-03-30 RX ADMIN — Medication 100 MILLIGRAM(S): at 23:00

## 2018-03-30 RX ADMIN — MORPHINE SULFATE 1 MILLIGRAM(S): 50 CAPSULE, EXTENDED RELEASE ORAL at 09:45

## 2018-03-30 RX ADMIN — IRON SUCROSE 260 MILLIGRAM(S): 20 INJECTION, SOLUTION INTRAVENOUS at 11:22

## 2018-03-30 NOTE — PROGRESS NOTE ADULT - ASSESSMENT
-s/p mechanical fall with rt femoral neck fx  -hx; cad-cabg,htn,hld,ckd  -dirty urine    plan no contraindication to or         urine culture         cardio f/u -s/p mechanical fall with rt femoral neck fx  -hx; cad-cabg,htn,hld,ckd,dm  -dirty urine    plan no contraindication to or         urine culture         cardio/endo f/u

## 2018-03-30 NOTE — BRIEF OPERATIVE NOTE - PROCEDURE
<<-----Click on this checkbox to enter Procedure Hemiarthroplasty of right hip  03/30/2018    Active  OFELIA

## 2018-03-30 NOTE — PROGRESS NOTE ADULT - SUBJECTIVE AND OBJECTIVE BOX
CHIEF COMPLAINT:Patient is a 89y old  Male who presents with a chief complaint of s/p fall .Pt appears comfortable.    	  REVIEW OF SYSTEMS:  CONSTITUTIONAL: No fever, weight loss, or fatigue  EYES: No eye pain, visual disturbances, or discharge  ENT:  No difficulty hearing, tinnitus, vertigo; No sinus or throat pain  NECK: No pain or stiffness  RESPIRATORY: No cough, wheezing, chills or hemoptysis; No Shortness of Breath  CARDIOVASCULAR: No chest pain, palpitations, passing out, dizziness, or leg swelling  GASTROINTESTINAL: No abdominal or epigastric pain. No nausea, vomiting, or hematemesis; No diarrhea or constipation. No melena or hematochezia.  GENITOURINARY: No dysuria, frequency, hematuria, or incontinence  NEUROLOGICAL: No headaches, memory loss, loss of strength, numbness, or tremors  SKIN: No itching, burning, rashes, or lesions   LYMPH Nodes: No enlarged glands  ENDOCRINE: No heat or cold intolerance; No hair loss  MUSCULOSKELETAL: No joint pain or swelling; No muscle, back, or extremity pain  PSYCHIATRIC: No depression, anxiety, mood swings, or difficulty sleeping  HEME/LYMPH: No easy bruising, or bleeding gums  ALLERGY AND IMMUNOLOGIC: No hives or eczema	        PHYSICAL EXAM:  T(C): 36.7 (03-30-18 @ 04:58), Max: 37.2 (03-29-18 @ 23:18)  HR: 76 (03-30-18 @ 04:58) (57 - 76)  BP: 135/63 (03-30-18 @ 04:58) (135/63 - 148/74)  RR: 17 (03-30-18 @ 04:58) (16 - 18)  SpO2: 97% (03-30-18 @ 04:58) (96% - 100%)  Wt(kg): --  I&O's Summary    29 Mar 2018 07:01  -  30 Mar 2018 07:00  --------------------------------------------------------  IN: 0 mL / OUT: 0 mL / NET: 0 mL        Appearance: Normal	  HEENT:   Normal oral mucosa, PERRL, EOMI	  Lymphatic: No lymphadenopathy  Cardiovascular: Normal S1 S2, No JVD, No murmurs, No edema  Respiratory: Lungs clear to auscultation	  Psychiatry: A & O x 3, Mood & affect appropriate  Gastrointestinal:  Soft, Non-tender, + BS	  Skin: No rashes, No ecchymoses, No cyanosis	  Neurologic: Non-focal  Extremities: Normal range of motion, No clubbing, cyanosis or edema  Vascular: Peripheral pulses palpable 2+ bilaterally    MEDICATIONS  (STANDING):  amLODIPine   Tablet 10 milliGRAM(s) Oral daily  aspirin enteric coated 81 milliGRAM(s) Oral daily  atorvastatin 40 milliGRAM(s) Oral at bedtime  citalopram 20 milliGRAM(s) Oral daily  dextrose 5%. 1000 milliLiter(s) (60 mL/Hr) IV Continuous <Continuous>  docusate sodium 100 milliGRAM(s) Oral three times a day  ferrous    sulfate 325 milliGRAM(s) Oral daily  finasteride 5 milliGRAM(s) Oral daily  heparin  Injectable 5000 Unit(s) SubCutaneous every 12 hours  insulin glargine Injectable (LANTUS) 20 Unit(s) SubCutaneous every morning  insulin lispro (HumaLOG) corrective regimen sliding scale   SubCutaneous three times a day before meals  iron sucrose IVPB 200 milliGRAM(s) IV Intermittent daily  isosorbide   mononitrate ER Tablet (IMDUR) 30 milliGRAM(s) Oral daily  metoprolol tartrate 25 milliGRAM(s) Oral two times a day  pantoprazole    Tablet 40 milliGRAM(s) Oral before breakfast  ranolazine 500 milliGRAM(s) Oral two times a day  tamsulosin 0.4 milliGRAM(s) Oral at bedtime      	  LABS:	 	                          9.9    7.0   )-----------( 117      ( 30 Mar 2018 07:26 )             29.6     03-30    141  |  116<H>  |  63<H>  ----------------------------<  85  4.6   |  14<L>  |  3.06<H>    Ca    8.6      30 Mar 2018 07:26  Phos  4.6     03-30  Mg     2.1     03-30    TPro  6.4  /  Alb  2.9<L>  /  TBili  0.4  /  DBili  x   /  AST  14  /  ALT  12  /  AlkPhos  100  03-30      Lipid Profile: Cholesterol 120  LDL 39  HDL 55        TSH: Thyroid Stimulating Hormone, Serum: 2.77 uU/mL (03-30 @ 07:26)    OBSERVATIONS:  Mitral Valve: Normal mitral valve. Trace mitral  regurgitation.  Aortic Root: Normal aortic root.  Aortic Valve: Aortic valve leaflet morphology not well  visualized, opens normally.  Left Atrium: Mild left atrial enlargement.  Left Ventricle: Endocardium not well visualized;grossly  normal left ventricular systolic function. Normal left  ventricular internal dimensions and wall thicknesses.  Right Heart: Normal right atrium. Normal right ventricular  size and function. There is trace tricuspid regurgitation.  There is trace pulmonic regurgitation.  Pericardium/PleuraNormal pericardium with no pericardial  effusion.  Hemodynamic: RA Pressure is 8 mm Hg. RV systolic pressure  is 33 mm Hg.    	    Iron with Total Binding Capacity in AM (03.30.18 @ 07:26)    Iron - Total Binding Capacity.: 217 ug/dL    % Saturation, Iron: 14 %    Iron Total, Serum: 30 ug/dL    Unsaturated Iron Binding Capacity: 187 ug/dL

## 2018-03-30 NOTE — PATIENT PROFILE ADULT. - TEACHING/LEARNING FACTORS INFLUENCE READINESS TO LEARN
MAIN [facility]    [address]    [place] [address]    Phone:  [phone]                                       After Visit Summary   3/14/2018    Krystyna Smith    MRN: [id number]           After Visit Summary Signature Page     I have received my discharge instructions, and my questions have been answered. I have discussed any challenges I see with this plan with the nurse or doctor.    ..........................................................................................................................................  Patient/Patient Representative Signature      ..........................................................................................................................................  Patient Representative Print Name and Relationship to Patient    ..................................................               ................................................  Date                                            Time    ..........................................................................................................................................  Reviewed by Signature/Title    ...................................................              ..............................................  Date                                                            Time           none

## 2018-03-30 NOTE — PROGRESS NOTE ADULT - ASSESSMENT
Patient is a 89M from home, AAOx3, lives with wife, ambulates independently, wife at bedside for [place]ian translation, with PMH HTN, DM, CAD, CABG 7 years ago, L Hip Fracture 8 years ago (s/p repair), and HLD presenting to the ED s/p mechanical fall over couch yesterday night 11pm. Admitted for acute right femoral neck fracture with mild angulation.

## 2018-03-30 NOTE — PROGRESS NOTE ADULT - PROBLEM SELECTOR PLAN 5
f/u HbA1C  holding glimeperide  reducing AM lantus 40 units --> 20 units while inpatient  HSS  monitor FS ac hs  diabetic diet  Dr. Allen- endo f/u HbA1C  holding Glimepiride  Lantus 20U HS + HSS FS AC/HS  diabetic diet  Dr. Allen

## 2018-03-30 NOTE — PROGRESS NOTE ADULT - SUBJECTIVE AND OBJECTIVE BOX
Patient is a 89y old  Male who presents with a chief complaint of s/p fall (29 Mar 2018 14:30)      INTERVAL HPI/OVERNIGHT EVENTS:  feeling well    VITAL SIGNS:  T(F): 98.1 (18 @ 04:58)  HR: 76 (18 @ 04:58)  BP: 135/63 (18 @ 04:58)  RR: 17 (18 @ 04:58)  SpO2: 97% (18 @ 04:58)  Wt(kg): --  I&O's Detail    29 Mar 2018 07:01  -  30 Mar 2018 07:00  --------------------------------------------------------  IN:  Total IN: 0 mL    OUT:  Total OUT: 0 mL    Total NET: 0 mL              REVIEW OF SYSTEMS:    CONSTITUTIONAL:  No fevers, chills, sweats    HEENT:  Eyes:  No diplopia or blurred vision. ENT:  No earache, sore throat or runny nose.    CARDIOVASCULAR:  No pressure, squeezing, tightness, or heaviness about the chest; no palpitations.    RESPIRATORY:  no sob,cough    GASTROINTESTINAL:  No abdominal pain, nausea, vomiting or diarrhea.    GENITOURINARY:  No dysuria, frequency or urgency.    NEUROLOGIC:  No paresthesias, fasciculations, seizures or weakness.    PSYCHIATRIC:  No disorder of thought or mood.      PHYSICAL EXAM:    Constitutional:no complaints  ENMT:atnc  Neck:supple  Respiratory:clear  Cardiovascular:rr  Gastrointestinal:soft  Extremities:no edema  Vascular:intact  Neurological:non focal  Musculoskeletal:no rt le foreshortened and externally rotated    MEDICATIONS  (STANDING):  amLODIPine   Tablet 10 milliGRAM(s) Oral daily  aspirin enteric coated 81 milliGRAM(s) Oral daily  atorvastatin 40 milliGRAM(s) Oral at bedtime  citalopram 20 milliGRAM(s) Oral daily  dextrose 5%. 1000 milliLiter(s) (60 mL/Hr) IV Continuous <Continuous>  docusate sodium 100 milliGRAM(s) Oral three times a day  ferrous    sulfate 325 milliGRAM(s) Oral daily  finasteride 5 milliGRAM(s) Oral daily  heparin  Injectable 5000 Unit(s) SubCutaneous every 12 hours  insulin glargine Injectable (LANTUS) 20 Unit(s) SubCutaneous every morning  insulin lispro (HumaLOG) corrective regimen sliding scale   SubCutaneous three times a day before meals  isosorbide   mononitrate ER Tablet (IMDUR) 30 milliGRAM(s) Oral daily  metoprolol tartrate 25 milliGRAM(s) Oral two times a day  pantoprazole    Tablet 40 milliGRAM(s) Oral before breakfast  ranolazine 500 milliGRAM(s) Oral two times a day  tamsulosin 0.4 milliGRAM(s) Oral at bedtime    MEDICATIONS  (PRN):  acetaminophen   Tablet. 650 milliGRAM(s) Oral every 6 hours PRN Mild Pain (1 - 3)  morphine  - Injectable 1 milliGRAM(s) IV Push every 4 hours PRN Moderate Pain (4 - 6)  morphine  - Injectable 2 milliGRAM(s) IV Push every 6 hours PRN Severe Pain (7 - 10)      Allergies    No Known Allergies    Intolerances        LABS:                        9.9    7.0   )-----------( 117      ( 30 Mar 2018 07:26 )             29.6     03-30    141  |  116<H>  |  63<H>  ----------------------------<  85  4.6   |  14<L>  |  3.06<H>    Ca    8.6      30 Mar 2018 07:26  Phos  4.6     03-30  Mg     2.1     03-30    TPro  6.4  /  Alb  2.9<L>  /  TBili  0.4  /  DBili  x   /  AST  14  /  ALT  12  /  AlkPhos  100  03-30    PT/INR - ( 30 Mar 2018 07:26 )   PT: 11.8 sec;   INR: 1.08 ratio         PTT - ( 29 Mar 2018 10:06 )  PTT:32.2 sec  Urinalysis Basic - ( 29 Mar 2018 23:04 )    Color: Yellow / Appearance: Clear / S.015 / pH: x  Gluc: x / Ketone: Negative  / Bili: Negative / Urobili: Negative   Blood: x / Protein: 100 / Nitrite: Negative   Leuk Esterase: Moderate / RBC: 2-5 /HPF / WBC 26-50 /HPF   Sq Epi: x / Non Sq Epi: Few /HPF / Bacteria: Many /HPF            CAPILLARY BLOOD GLUCOSE      POCT Blood Glucose.: 90 mg/dL (30 Mar 2018 07:51)        RADIOLOGY & ADDITIONAL TESTS:    CXR:  EXAM:  XR CHEST AP OR PA 1V                            PROCEDURE DATE:  2018          INTERPRETATION:  Portable chest x-ray    Indication: Trauma    Portable chest x-ray is acquired without a previous examination for   comparison.    Impression: No evidence for pulmonary consolidation, pleural effusion or   pneumothorax.    The trachea is midline.    The cardiac silhouette is magnified by AP technique.    Stable median sternotomy wires.     No gross acute bony fracture is identified.      EXAM:  XR HIP WITH PELV 2-3V RT                            PROCEDURE DATE:  2018          INTERPRETATION:  X-rays of the right hip and pelvis    Indication: Right hip pain from a fall.    2 views of the right hip and single AP view of the pelvis are compared to   a previous examination dated 2015.    Impression: New acute right femoral neck fracture with mild angulation.   This finding is communicated with the emergency department via the PACS   communication system.    No dislocation.    Left hip screw/pins are present.    The hip joint spaces are preserved bilaterally.          echo:  CONCLUSIONS:  1. Trace mitral regurgitation.  2. Mild left atrial enlargement.  3. Normal left ventricular internal dimensions and wall  thicknesses.  4. Endocardium not well visualized; grossly normal left  ventricular systolic function.  5. Normal right ventricular size and function.

## 2018-03-30 NOTE — PROGRESS NOTE ADULT - SUBJECTIVE AND OBJECTIVE BOX
PGY 1 Note discussed with supervising resident and primary attending    Patient is a 89y old  Male who presents with a chief complaint of s/p fall (29 Mar 2018 14:30)      INTERVAL HPI/OVERNIGHT EVENTS: no overnight events    MEDICATIONS  (STANDING):  amLODIPine   Tablet 10 milliGRAM(s) Oral daily  aspirin enteric coated 81 milliGRAM(s) Oral daily  atorvastatin 40 milliGRAM(s) Oral at bedtime  citalopram 20 milliGRAM(s) Oral daily  docusate sodium 100 milliGRAM(s) Oral three times a day  ferrous    sulfate 325 milliGRAM(s) Oral daily  finasteride 5 milliGRAM(s) Oral daily  heparin  Injectable 5000 Unit(s) SubCutaneous every 12 hours  insulin glargine Injectable (LANTUS) 20 Unit(s) SubCutaneous every morning  insulin lispro (HumaLOG) corrective regimen sliding scale   SubCutaneous three times a day before meals  isosorbide   mononitrate ER Tablet (IMDUR) 30 milliGRAM(s) Oral daily  metoprolol tartrate 25 milliGRAM(s) Oral two times a day  pantoprazole    Tablet 40 milliGRAM(s) Oral before breakfast  ranolazine 500 milliGRAM(s) Oral two times a day  tamsulosin 0.4 milliGRAM(s) Oral at bedtime    MEDICATIONS  (PRN):  acetaminophen   Tablet. 650 milliGRAM(s) Oral every 6 hours PRN Mild Pain (1 - 3)  morphine  - Injectable 1 milliGRAM(s) IV Push every 4 hours PRN Moderate Pain (4 - 6)  morphine  - Injectable 2 milliGRAM(s) IV Push every 6 hours PRN Severe Pain (7 - 10)      __________________________________________________  REVIEW OF SYSTEMS:    CONSTITUTIONAL: No fever,   EYES: no acute visual disturbances  NECK: No pain or stiffness  RESPIRATORY: No cough; No shortness of breath  CARDIOVASCULAR: No chest pain, no palpitations  GASTROINTESTINAL: No pain. No nausea or vomiting; No diarrhea   NEUROLOGICAL: No headache or numbness, no tremors  MUSCULOSKELETAL: No joint pain, no muscle pain  GENITOURINARY: no dysuria, no frequency, no hesitancy  PSYCHIATRY: no depression , no anxiety  ALL OTHER  ROS negative        Vital Signs Last 24 Hrs  T(C): 36.7 (30 Mar 2018 04:58), Max: 37.2 (29 Mar 2018 23:18)  T(F): 98.1 (30 Mar 2018 04:58), Max: 98.9 (29 Mar 2018 23:18)  HR: 76 (30 Mar 2018 04:58) (57 - 82)  BP: 135/63 (30 Mar 2018 04:58) (135/63 - 188/88)  BP(mean): --  RR: 17 (30 Mar 2018 04:58) (16 - 18)  SpO2: 97% (30 Mar 2018 04:58) (96% - 100%)    ________________________________________________  PHYSICAL EXAM:  GENERAL: NAD  HEENT: Normocephalic;  conjunctivae and sclerae clear; moist mucous membranes;   NECK : supple  CHEST/LUNG: Clear to auscultation bilaterally with good air entry   HEART: S1 S2  regular; no murmurs, gallops or rubs  ABDOMEN: Soft, Nontender, Nondistended; Bowel sounds present  EXTREMITIES: no cyanosis; no edema; no calf tenderness  SKIN: warm and dry; no rash  NERVOUS SYSTEM:  Awake and alert; Oriented  to place, person and time ; no new deficits    _________________________________________________  LABS:                        9.9    7.0   )-----------( 117      ( 30 Mar 2018 07:26 )             29.6     03-29    141  |  114<H>  |  62<H>  ----------------------------<  88  4.5   |  16<L>  |  3.05<H>    Ca    8.7      29 Mar 2018 10:06    TPro  7.1  /  Alb  3.4<L>  /  TBili  0.4  /  DBili  x   /  AST  14  /  ALT  13  /  AlkPhos  105  -    PT/INR - ( 29 Mar 2018 10:06 )   PT: 11.2 sec;   INR: 1.03 ratio         PTT - ( 29 Mar 2018 10:06 )  PTT:32.2 sec  Urinalysis Basic - ( 29 Mar 2018 23:04 )    Color: Yellow / Appearance: Clear / S.015 / pH: x  Gluc: x / Ketone: Negative  / Bili: Negative / Urobili: Negative   Blood: x / Protein: 100 / Nitrite: Negative   Leuk Esterase: Moderate / RBC: 2-5 /HPF / WBC 26-50 /HPF   Sq Epi: x / Non Sq Epi: Few /HPF / Bacteria: Many /HPF      CAPILLARY BLOOD GLUCOSE            RADIOLOGY & ADDITIONAL TESTS:    Imaging Personally Reviewed:  YES/NO    Consultant(s) Notes Reviewed:   YES/ No    Care Discussed with Consultants :     Plan of care was discussed with patient and /or primary care giver; all questions and concerns were addressed and care was aligned with patient's wishes.

## 2018-03-30 NOTE — PROGRESS NOTE ADULT - PROBLEM SELECTOR PLAN 1
s/p mechanical fall yesterday night  R hip X ray shows acute right femoral neck fracture with mild angulation.  Dr. Sosa: scheduled for right hip david arthroplasty today   Pain management  PT eval post-op s/p mechanical fall   R hip X ray shows acute right femoral neck fracture with mild angulation.  Dr. Sosa: scheduled for right hip david arthroplasty today   Pain management  PT eval post-op

## 2018-03-30 NOTE — PROGRESS NOTE ADULT - PROBLEM SELECTOR PLAN 4
resuming norvascjavir, atenolol interchanged with metoprolol  monitor BP Norvasc, IMDUR, atenolol interchanged with metoprolol  monitor BP

## 2018-03-31 LAB
ANION GAP SERPL CALC-SCNC: 12 MMOL/L — SIGNIFICANT CHANGE UP (ref 5–17)
BASOPHILS # BLD AUTO: 0.1 K/UL — SIGNIFICANT CHANGE UP (ref 0–0.2)
BASOPHILS NFR BLD AUTO: 0.6 % — SIGNIFICANT CHANGE UP (ref 0–2)
BUN SERPL-MCNC: 66 MG/DL — HIGH (ref 7–18)
CALCIUM SERPL-MCNC: 8.4 MG/DL — SIGNIFICANT CHANGE UP (ref 8.4–10.5)
CHLORIDE SERPL-SCNC: 116 MMOL/L — HIGH (ref 96–108)
CO2 SERPL-SCNC: 14 MMOL/L — LOW (ref 22–31)
CREAT SERPL-MCNC: 3.19 MG/DL — HIGH (ref 0.5–1.3)
EOSINOPHIL # BLD AUTO: 0 K/UL — SIGNIFICANT CHANGE UP (ref 0–0.5)
EOSINOPHIL NFR BLD AUTO: 0.2 % — SIGNIFICANT CHANGE UP (ref 0–6)
GLUCOSE SERPL-MCNC: 112 MG/DL — HIGH (ref 70–99)
HCT VFR BLD CALC: 28 % — LOW (ref 39–50)
HGB BLD-MCNC: 9.1 G/DL — LOW (ref 13–17)
LYMPHOCYTES # BLD AUTO: 0.8 K/UL — LOW (ref 1–3.3)
LYMPHOCYTES # BLD AUTO: 8.6 % — LOW (ref 13–44)
MAGNESIUM SERPL-MCNC: 2 MG/DL — SIGNIFICANT CHANGE UP (ref 1.6–2.6)
MCHC RBC-ENTMCNC: 31.6 PG — SIGNIFICANT CHANGE UP (ref 27–34)
MCHC RBC-ENTMCNC: 32.3 GM/DL — SIGNIFICANT CHANGE UP (ref 32–36)
MCV RBC AUTO: 97.7 FL — SIGNIFICANT CHANGE UP (ref 80–100)
MONOCYTES # BLD AUTO: 0.7 K/UL — SIGNIFICANT CHANGE UP (ref 0–0.9)
MONOCYTES NFR BLD AUTO: 7.8 % — SIGNIFICANT CHANGE UP (ref 2–14)
NEUTROPHILS # BLD AUTO: 7.6 K/UL — HIGH (ref 1.8–7.4)
NEUTROPHILS NFR BLD AUTO: 82.8 % — HIGH (ref 43–77)
PHOSPHATE SERPL-MCNC: 5.2 MG/DL — HIGH (ref 2.5–4.5)
PLATELET # BLD AUTO: 114 K/UL — LOW (ref 150–400)
POTASSIUM SERPL-MCNC: 5.4 MMOL/L — HIGH (ref 3.5–5.3)
POTASSIUM SERPL-SCNC: 5.4 MMOL/L — HIGH (ref 3.5–5.3)
RBC # BLD: 2.87 M/UL — LOW (ref 4.2–5.8)
RBC # FLD: 13.8 % — SIGNIFICANT CHANGE UP (ref 10.3–14.5)
SODIUM SERPL-SCNC: 142 MMOL/L — SIGNIFICANT CHANGE UP (ref 135–145)
WBC # BLD: 9.1 K/UL — SIGNIFICANT CHANGE UP (ref 3.8–10.5)
WBC # FLD AUTO: 9.1 K/UL — SIGNIFICANT CHANGE UP (ref 3.8–10.5)

## 2018-03-31 RX ORDER — ZOLPIDEM TARTRATE 10 MG/1
5 TABLET ORAL AT BEDTIME
Qty: 0 | Refills: 0 | Status: DISCONTINUED | OUTPATIENT
Start: 2018-03-31 | End: 2018-03-31

## 2018-03-31 RX ADMIN — ATORVASTATIN CALCIUM 40 MILLIGRAM(S): 80 TABLET, FILM COATED ORAL at 21:17

## 2018-03-31 RX ADMIN — Medication 25 MILLIGRAM(S): at 06:48

## 2018-03-31 RX ADMIN — Medication 3: at 17:15

## 2018-03-31 RX ADMIN — INSULIN GLARGINE 20 UNIT(S): 100 INJECTION, SOLUTION SUBCUTANEOUS at 12:27

## 2018-03-31 RX ADMIN — ZOLPIDEM TARTRATE 5 MILLIGRAM(S): 10 TABLET ORAL at 00:57

## 2018-03-31 RX ADMIN — ISOSORBIDE MONONITRATE 30 MILLIGRAM(S): 60 TABLET, EXTENDED RELEASE ORAL at 12:12

## 2018-03-31 RX ADMIN — SODIUM CHLORIDE 100 MILLILITER(S): 9 INJECTION, SOLUTION INTRAVENOUS at 18:51

## 2018-03-31 RX ADMIN — Medication 25 MILLIGRAM(S): at 17:15

## 2018-03-31 RX ADMIN — CITALOPRAM 20 MILLIGRAM(S): 10 TABLET, FILM COATED ORAL at 11:43

## 2018-03-31 RX ADMIN — SODIUM CHLORIDE 100 MILLILITER(S): 9 INJECTION, SOLUTION INTRAVENOUS at 06:49

## 2018-03-31 RX ADMIN — RANOLAZINE 500 MILLIGRAM(S): 500 TABLET, FILM COATED, EXTENDED RELEASE ORAL at 17:15

## 2018-03-31 RX ADMIN — TAMSULOSIN HYDROCHLORIDE 0.4 MILLIGRAM(S): 0.4 CAPSULE ORAL at 21:17

## 2018-03-31 RX ADMIN — Medication 100 MILLIGRAM(S): at 06:49

## 2018-03-31 RX ADMIN — ZOLPIDEM TARTRATE 5 MILLIGRAM(S): 10 TABLET ORAL at 21:25

## 2018-03-31 RX ADMIN — HEPARIN SODIUM 5000 UNIT(S): 5000 INJECTION INTRAVENOUS; SUBCUTANEOUS at 17:15

## 2018-03-31 RX ADMIN — RANOLAZINE 500 MILLIGRAM(S): 500 TABLET, FILM COATED, EXTENDED RELEASE ORAL at 06:49

## 2018-03-31 RX ADMIN — AMLODIPINE BESYLATE 10 MILLIGRAM(S): 2.5 TABLET ORAL at 06:48

## 2018-03-31 RX ADMIN — FINASTERIDE 5 MILLIGRAM(S): 5 TABLET, FILM COATED ORAL at 11:43

## 2018-03-31 RX ADMIN — Medication 1: at 11:42

## 2018-03-31 RX ADMIN — HEPARIN SODIUM 5000 UNIT(S): 5000 INJECTION INTRAVENOUS; SUBCUTANEOUS at 06:48

## 2018-03-31 RX ADMIN — MORPHINE SULFATE 2 MILLIGRAM(S): 50 CAPSULE, EXTENDED RELEASE ORAL at 14:50

## 2018-03-31 RX ADMIN — MORPHINE SULFATE 2 MILLIGRAM(S): 50 CAPSULE, EXTENDED RELEASE ORAL at 15:20

## 2018-03-31 NOTE — PROGRESS NOTE ADULT - SUBJECTIVE AND OBJECTIVE BOX
HPI:  Patient is a 89M from home, AAOx3, lives with wife, ambulates independently, wife at bedside for [place]an translation, with PMH HTN, DM, CAD, CABG 7 years ago, L Hip Fracture 8 years ago (s/p repair), and HLD presenting to the ED s/p mechanical fall over couch yesterday night 11pm, falling onto his R hip. Denies fever, chills, SOB, palpitations, chest pain, nausea, vomiting, diarrhea or constipation, only ROS + R hip pain s/p fracture. Denies syncope, LOC or head injury at time of fall.  s/p surgery  pt comfortable  PMH: as per HPI  Surgical Hx: L hip surgery  Fam Hx: mother and father- DM  Social Hx: neg for smoking or etoh  Allergies: NKDA (29 Mar 2018 14:30)      Meds:    Allergies:  Allergies    No Known Allergies    Intolerances        REVIEW OF SYSTEMS:  pt sleeping  CONSTITUTIONAL: No weakness, fevers or chills  EYES/ENT: No visual changes;  No vertigo or throat pain   NECK: No pain or stiffness  RESPIRATORY: No cough, wheezing, hemoptysis; No shortness of breath  CARDIOVASCULAR: No chest pain or palpitations  GASTROINTESTINAL: No abdominal or epigastric pain. No nausea, vomiting, or hematemesis; No diarrhea or constipation. No melena or hematochezia.  GENITOURINARY: No dysuria, frequency or hematuria  NEUROLOGICAL: No numbness or weakness  SKIN: No itching, burning, rashes, or lesions   All other review of systems is negative unless indicated above.    PHYSICAL EXAM:    Vital Signs Last 24 Hrs  T(C): 36.4 (31 Mar 2018 14:27), Max: 36.6 (31 Mar 2018 01:02)  T(F): 97.5 (31 Mar 2018 14:27), Max: 97.8 (31 Mar 2018 01:02)  HR: 69 (31 Mar 2018 15:07) (69 - 82)  BP: 98/41 (31 Mar 2018 15:07) (98/41 - 143/64)  BP(mean): 66 (30 Mar 2018 19:27) (66 - 78)  RR: 16 (31 Mar 2018 14:27) (12 - 19)  SpO2: 100% (31 Mar 2018 15:07) (96% - 100%)    HEENT:elderly male  lungs ae fair  cardia reg  abd soft  neuro deferred    Neck:  [  ] Supple  [  ] Lymphadenopathy  [  ] JVD  [  ] Masses  [  ] WNL    CHEST/Respiratory: [ ] Clear to auscultation     [  ] Rales      [  ] Rhonchi      [  ] Wheezing       [  ] Chest Tenderness     [  ] WNL    Cardiovascular:  [  ]S1 S2  [  ] Reg  [  ] Irreg   [  ] No Murmurs   [  ] Murmurs  [  ] Systolic [  ] Diastolic    Gastrointestinal:  [  ] Bowel Sounds  [   ] ABD Soft  [  ] ABD Distention   [  ] No Tenderness [  ] Tenderness  [  ] Organomegaly  [  ] Guarding rigidity  [  ] No Guarding rigidity  [  ] Rebound Tenderness [  ] No Rebound Tenderness    Extremities: [  ] Edema  [  ] No Edema  [  ] Clubbing   [  ] Cyanosis  [  ] Palpable peripheral pulses                          [  ] Tender calf muscles    [  ] No Tender Calf Muscles    Neurological:  [  ] Alert  [  ] Awake  [  ] Oriented  x                              [  ] Focal weakness  [  ] No Focal Weakness    Skin:  [  ] Thrombophlebitis  [  ] Rashes  [  ] Dry  [  ] Ulcers    Ortho:  [  ] Joint Swelling  [  ] Joint erythema [  ] DJD [  ] Increased Temperature to Touch      LABS/DIAGNOSTIC TESTS                          9.1    9.1   )-----------( 114      ( 31 Mar 2018 07:03 )             28.0         03-31    142  |  116<H>  |  66<H>  ----------------------------<  112<H>  5.4<H>   |  14<L>  |  3.19<H>    Ca    8.4      31 Mar 2018 07:03  Phos  5.2     03-31  Mg     2.0     03-31    TPro  6.4  /  Alb  2.9<L>  /  TBili  0.4  /  DBili  x   /  AST  14  /  ALT  12  /  AlkPhos  100  03-30      CAPILLARY BLOOD GLUCOSE      POCT Blood Glucose.: 279 mg/dL (31 Mar 2018 16:37)  POCT Blood Glucose.: 162 mg/dL (31 Mar 2018 11:21)  POCT Blood Glucose.: 80 mg/dL (31 Mar 2018 08:00)      LIVER FUNCTIONS - ( 30 Mar 2018 07:26 )  Alb: 2.9 g/dL / Pro: 6.4 g/dL / ALK PHOS: 100 U/L / ALT: 12 U/L DA / AST: 14 U/L / GGT: x             Thyroid Stimulating Hormone, Serum: 2.77 uU/mL (03-30 @ 07:26)    CULTURES:       RADIOLOGY  CXR:    acetaminophen   Tablet 650 milliGRAM(s) Oral every 6 hours PRN  amLODIPine   Tablet 10 milliGRAM(s) Oral daily  atorvastatin 40 milliGRAM(s) Oral at bedtime  citalopram 20 milliGRAM(s) Oral daily  finasteride 5 milliGRAM(s) Oral daily  heparin  Injectable 5000 Unit(s) SubCutaneous every 12 hours  insulin glargine Injectable (LANTUS) 20 Unit(s) SubCutaneous every morning  insulin lispro (HumaLOG) corrective regimen sliding scale   SubCutaneous three times a day before meals  isosorbide   mononitrate ER Tablet (IMDUR) 30 milliGRAM(s) Oral daily  lactated ringers. 1000 milliLiter(s) IV Continuous <Continuous>  magnesium hydroxide Suspension 30 milliLiter(s) Oral daily PRN  metoprolol tartrate 25 milliGRAM(s) Oral two times a day  ondansetron Injectable 4 milliGRAM(s) IV Push every 6 hours PRN  ranolazine 500 milliGRAM(s) Oral two times a day  tamsulosin 0.4 milliGRAM(s) Oral at bedtime  zolpidem 5 milliGRAM(s) Oral at bedtime PRN

## 2018-03-31 NOTE — PROGRESS NOTE ADULT - SUBJECTIVE AND OBJECTIVE BOX
CARDIOLOGY     PROGRESS  NOTE   ________________________________________________    CHIEF COMPLAINT:Patient is a 89y old  Male who presents with a chief complaint of s/p fall (29 Mar 2018 14:30)    	  REVIEW OF SYSTEMS:  CONSTITUTIONAL: No fever, weight loss, or fatigue  EYES: No eye pain, visual disturbances, or discharge  ENT:  No difficulty hearing, tinnitus, vertigo; No sinus or throat pain  NECK: No pain or stiffness  RESPIRATORY: No cough, wheezing, chills or hemoptysis; No Shortness of Breath  CARDIOVASCULAR: No chest pain, palpitations, passing out, dizziness, or leg swelling  GASTROINTESTINAL: No abdominal or epigastric pain. No nausea, vomiting, or hematemesis; No diarrhea or constipation. No melena or hematochezia.  GENITOURINARY: No dysuria, frequency, hematuria, or incontinence  NEUROLOGICAL: No headaches, memory loss, loss of strength, numbness, or tremors  SKIN: No itching, burning, rashes, or lesions   LYMPH Nodes: No enlarged glands  ENDOCRINE: No heat or cold intolerance; No hair loss  MUSCULOSKELETAL: No joint pain or swelling; No muscle, back, or extremity pain  PSYCHIATRIC: No depression, anxiety, mood swings, or difficulty sleeping  HEME/LYMPH: No easy bruising, or bleeding gums  ALLERGY AND IMMUNOLOGIC: No hives or eczema	    [ ] All others negative	  [ ] Unable to obtain    PHYSICAL EXAM:  T(C): 36.6 (03-31-18 @ 01:02), Max: 36.6 (03-30-18 @ 13:28)  HR: 81 (03-31-18 @ 01:02) (64 - 88)  BP: 116/54 (03-31-18 @ 01:02) (113/49 - 194/81)  RR: 17 (03-31-18 @ 01:02) (12 - 19)  SpO2: 99% (03-31-18 @ 01:02) (99% - 100%)  Wt(kg): --  I&O's Summary    30 Mar 2018 07:01  -  31 Mar 2018 07:00  --------------------------------------------------------  IN: 1950 mL / OUT: 275 mL / NET: 1675 mL    31 Mar 2018 07:01  -  31 Mar 2018 10:11  --------------------------------------------------------  IN: 0 mL / OUT: 500 mL / NET: -500 mL        Appearance: Normal	  HEENT:   Normal oral mucosa, PERRL, EOMI	  Lymphatic: No lymphadenopathy  Cardiovascular: Normal S1 S2, No JVD, No murmurs, No edema  Respiratory: Lungs clear to auscultation	  Psychiatry: A & O x 3, Mood & affect appropriate  Gastrointestinal:  Soft, Non-tender, + BS	  Skin: No rashes, No ecchymoses, No cyanosis	  Neurologic: Non-focal  Extremities: Normal range of motion, No clubbing, cyanosis or edema  Vascular: Peripheral pulses palpable 2+ bilaterally    MEDICATIONS  (STANDING):  amLODIPine   Tablet 10 milliGRAM(s) Oral daily  atorvastatin 40 milliGRAM(s) Oral at bedtime  citalopram 20 milliGRAM(s) Oral daily  finasteride 5 milliGRAM(s) Oral daily  heparin  Injectable 5000 Unit(s) SubCutaneous every 12 hours  insulin glargine Injectable (LANTUS) 20 Unit(s) SubCutaneous every morning  insulin lispro (HumaLOG) corrective regimen sliding scale   SubCutaneous three times a day before meals  isosorbide   mononitrate ER Tablet (IMDUR) 30 milliGRAM(s) Oral daily  lactated ringers. 1000 milliLiter(s) (100 mL/Hr) IV Continuous <Continuous>  metoprolol tartrate 25 milliGRAM(s) Oral two times a day  ranolazine 500 milliGRAM(s) Oral two times a day  tamsulosin 0.4 milliGRAM(s) Oral at bedtime      TELEMETRY: 	    ECG:  	  RADIOLOGY:  OTHER: 	  	  LABS:	 	    CARDIAC MARKERS:  < from: Transthoracic Echocardiogram (03.29.18 @ 13:52) >  1. Trace mitral regurgitation.  2. Mild left atrial enlargement.  3. Normal left ventricular internal dimensions and wall  thicknesses.  4. Endocardium not well visualized; grossly normal left  ventricular systolic function.  5. Normal right ventricular size and function.      < end of copied text >                                9.1    9.1   )-----------( 114      ( 31 Mar 2018 07:03 )             28.0     03-31    142  |  116<H>  |  66<H>  ----------------------------<  112<H>  5.4<H>   |  14<L>  |  3.19<H>    Ca    8.4      31 Mar 2018 07:03  Phos  5.2     03-31  Mg     2.0     03-31    TPro  6.4  /  Alb  2.9<L>  /  TBili  0.4  /  DBili  x   /  AST  14  /  ALT  12  /  AlkPhos  100  03-30    proBNP:   Lipid Profile: Cholesterol 120  LDL 39  HDL 55      HgA1c:   TSH: Thyroid Stimulating Hormone, Serum: 2.77 uU/mL (03-30 @ 07:26)    PT/INR - ( 30 Mar 2018 07:26 )   PT: 11.8 sec;   INR: 1.08 ratio               Assessment and plan  ---------------------------  89 yr old male with PMHX of CAD,HTN,Lipid D/O,Anemia,CRI,DM who presents s/p fall with RT hip pain.  1.Pt at moderate risk for franklin-operative cardiac complication.  2.HTN and CAD-asa,lopressor.  3.Anemia-IV Iron.  4.CRI-F/U lytes.  5.DM-Insulin.  6.Pain control.  7.GI and DVT prophylaxis. CARDIOLOGY     PROGRESS  NOTE   ________________________________________________    CHIEF COMPLAINT:Patient is a 89y old  Male who presents with a chief complaint of s/p fall (29 Mar 2018 14:30)    	  REVIEW OF SYSTEMS:  CONSTITUTIONAL: No fever, weight loss, or fatigue  EYES: No eye pain, visual disturbances, or discharge  ENT:  No difficulty hearing, tinnitus, vertigo; No sinus or throat pain  NECK: No pain or stiffness  RESPIRATORY: No cough, wheezing, chills or hemoptysis; No Shortness of Breath  CARDIOVASCULAR: No chest pain, palpitations, passing out, dizziness, or leg swelling  GASTROINTESTINAL: No abdominal or epigastric pain. No nausea, vomiting, or hematemesis; No diarrhea or constipation. No melena or hematochezia.  GENITOURINARY: No dysuria, frequency, hematuria, or incontinence  NEUROLOGICAL: No headaches, memory loss, loss of strength, numbness, or tremors  SKIN: No itching, burning, rashes, or lesions   LYMPH Nodes: No enlarged glands  ENDOCRINE: No heat or cold intolerance; No hair loss  MUSCULOSKELETAL: No joint pain or swelling; No muscle, back, or extremity pain  PSYCHIATRIC: No depression, anxiety, mood swings, or difficulty sleeping  HEME/LYMPH: No easy bruising, or bleeding gums  ALLERGY AND IMMUNOLOGIC: No hives or eczema	    [ ] All others negative	  [ ] Unable to obtain    PHYSICAL EXAM:  T(C): 36.6 (03-31-18 @ 01:02), Max: 36.6 (03-30-18 @ 13:28)  HR: 81 (03-31-18 @ 01:02) (64 - 88)  BP: 116/54 (03-31-18 @ 01:02) (113/49 - 194/81)  RR: 17 (03-31-18 @ 01:02) (12 - 19)  SpO2: 99% (03-31-18 @ 01:02) (99% - 100%)  Wt(kg): --  I&O's Summary    30 Mar 2018 07:01  -  31 Mar 2018 07:00  --------------------------------------------------------  IN: 1950 mL / OUT: 275 mL / NET: 1675 mL    31 Mar 2018 07:01  -  31 Mar 2018 10:11  --------------------------------------------------------  IN: 0 mL / OUT: 500 mL / NET: -500 mL        Appearance: Normal	  HEENT:   Normal oral mucosa, PERRL, EOMI	  Lymphatic: No lymphadenopathy  Cardiovascular: Normal S1 S2, No JVD, No murmurs, No edema  Respiratory: Lungs clear to auscultation	  Psychiatry: A & O x 3, Mood & affect appropriate  Gastrointestinal:  Soft, Non-tender, + BS	  Skin: No rashes, No ecchymoses, No cyanosis	  Neurologic: Non-focal  Extremities: Normal range of motion, No clubbing, cyanosis or edema  Vascular: Peripheral pulses palpable 2+ bilaterally    MEDICATIONS  (STANDING):  amLODIPine   Tablet 10 milliGRAM(s) Oral daily  atorvastatin 40 milliGRAM(s) Oral at bedtime  citalopram 20 milliGRAM(s) Oral daily  finasteride 5 milliGRAM(s) Oral daily  heparin  Injectable 5000 Unit(s) SubCutaneous every 12 hours  insulin glargine Injectable (LANTUS) 20 Unit(s) SubCutaneous every morning  insulin lispro (HumaLOG) corrective regimen sliding scale   SubCutaneous three times a day before meals  isosorbide   mononitrate ER Tablet (IMDUR) 30 milliGRAM(s) Oral daily  lactated ringers. 1000 milliLiter(s) (100 mL/Hr) IV Continuous <Continuous>  metoprolol tartrate 25 milliGRAM(s) Oral two times a day  ranolazine 500 milliGRAM(s) Oral two times a day  tamsulosin 0.4 milliGRAM(s) Oral at bedtime      TELEMETRY: 	    ECG:  	  RADIOLOGY:  OTHER: 	  	  LABS:	 	    CARDIAC MARKERS:  < from: Transthoracic Echocardiogram (03.29.18 @ 13:52) >  1. Trace mitral regurgitation.  2. Mild left atrial enlargement.  3. Normal left ventricular internal dimensions and wall  thicknesses.  4. Endocardium not well visualized; grossly normal left  ventricular systolic function.  5. Normal right ventricular size and function.      < end of copied text >                                9.1    9.1   )-----------( 114      ( 31 Mar 2018 07:03 )             28.0     03-31    142  |  116<H>  |  66<H>  ----------------------------<  112<H>  5.4<H>   |  14<L>  |  3.19<H>    Ca    8.4      31 Mar 2018 07:03  Phos  5.2     03-31  Mg     2.0     03-31    TPro  6.4  /  Alb  2.9<L>  /  TBili  0.4  /  DBili  x   /  AST  14  /  ALT  12  /  AlkPhos  100  03-30    proBNP:   Lipid Profile: Cholesterol 120  LDL 39  HDL 55      HgA1c:   TSH: Thyroid Stimulating Hormone, Serum: 2.77 uU/mL (03-30 @ 07:26)    PT/INR - ( 30 Mar 2018 07:26 )   PT: 11.8 sec;   INR: 1.08 ratio               Assessment and plan  ---------------------------  89 yr old male with PMHX of CAD,HTN,Lipid D/O,Anemia,CRI,DM who presents s/p fall with RT hip pain.  1.Pt at moderate risk for franklin-operative cardiac complication, doing well post op.  2.HTN and CAD-asa,lopressor.  3.Anemia-IV Iron.  4.CRI-F/U lytes.  5.DM-Insulin.  6.Pain control.  7.GI and DVT prophylaxis.

## 2018-03-31 NOTE — PROGRESS NOTE ADULT - ASSESSMENT
1.s/p hip surg  post op as per ortho  2.arf/ckd  renal eval dr lamb  3.diabetes  lantus/humalog  follow trend  4.anemia  5.cad  as per cardio

## 2018-03-31 NOTE — PHYSICAL THERAPY INITIAL EVALUATION ADULT - MANUAL MUSCLE TESTING RESULTS, REHAB EVAL
bilateral UE and LE: 4+/5 except Right hip flexion and knee flex/ext due to pain./no strength deficits were identified

## 2018-03-31 NOTE — PHYSICAL THERAPY INITIAL EVALUATION ADULT - CRITERIA FOR SKILLED THERAPEUTIC INTERVENTIONS
functional limitations in following categories/risk reduction/prevention/anticipated equipment needs at discharge/therapy frequency/predicted duration of therapy intervention/impairments found/anticipated discharge recommendation/rehab potential

## 2018-03-31 NOTE — PROGRESS NOTE ADULT - ATTENDING COMMENTS
I saw and evaluated pt POD#1 right hip hemiarthroplasty. Only mild pain right hip.  Right hip dressing dry and intact. Moving toes with good sensation.   Abduction pillow in place.  Seen by PT and OOB to chair today.  Continue PT WBAT.  Discussed with pt.

## 2018-03-31 NOTE — PROGRESS NOTE ADULT - SUBJECTIVE AND OBJECTIVE BOX
MARCELLA NIEVES  89y [id number]      Diagnosis:  S/p Right Hip Hemiarthroplasty POD# 1    Patient is seen and evaluated at bedside; offers no acute complaints. Pain is mild; well controlled.  Awaiting PT for ambulation.    Denies CP/SOB, palpitations, dyspnea, paresthesias, N/V    Vital Signs Last 24 Hrs  T(C): 36.6 (31 Mar 2018 01:02), Max: 36.6 (30 Mar 2018 13:28)  T(F): 97.8 (31 Mar 2018 01:02), Max: 97.8 (30 Mar 2018 13:28)  HR: 81 (31 Mar 2018 01:02) (64 - 88)  BP: 116/54 (31 Mar 2018 01:02) (113/49 - 194/81)  BP(mean): 66 (30 Mar 2018 19:27) (66 - 78)  RR: 17 (31 Mar 2018 01:02) (12 - 19)  SpO2: 99% (31 Mar 2018 01:02) (99% - 100%)  I&O's Summary    30 Mar 2018 07:01  -  31 Mar 2018 07:00  --------------------------------------------------------  IN: 1950 mL / OUT: 275 mL / NET: 1675 mL    31 Mar 2018 07:01  -  31 Mar 2018 10:54  --------------------------------------------------------  IN: 0 mL / OUT: 500 mL / NET: -500 mL        Physical Exam:    General: AAOx3, in NAD, resting comfortably in bed.    Right hip:  In nl. alignment. Abduction pillow intact. Dressing is C/D/I.  Skin pink, warm.  Lower ext: No CT, calves soft, 2+pedal pulses. NVI. 5/5 strength of ehl/ta/gs b/l.                           9.1    9.1   )-----------( 114      ( 31 Mar 2018 07:03 )             28.0     03-31    142  |  116<H>  |  66<H>  ----------------------------<  112<H>  5.4<H>   |  14<L>  |  3.19<H>    Ca    8.4      31 Mar 2018 07:03  Phos  5.2     03-31  Mg     2.0     03-31    TPro  6.4  /  Alb  2.9<L>  /  TBili  0.4  /  DBili  x   /  AST  14  /  ALT  12  /  AlkPhos  100  03-30      Impression:  89yFemale S/p Right Hip Hemiarthroplasty POD# 1  Plan:  -  Continue pain management  -  DVT prophylaxis with Lovenox  -  Daily Physical Therapy:  WBAT on RLE with walker  - *Total hip precautions (bedside commode, trapeze bar, high chair, abduction pillow between legs when in bed)  -  Discharge planning: Home Vs. Rehab pending Physical therapy eval.  -  Continue Antibiotics x 24hrs post-op  -  Discontinue Ahumada catheter now  -  Encouraged use of incentive spirometer  -  Case d/w Dr. Sosa

## 2018-03-31 NOTE — PHYSICAL THERAPY INITIAL EVALUATION ADULT - GENERAL OBSERVATIONS, REHAB EVAL
Patient received in bed supine, with HOB elevated, alert and awake, NAD, + supplemental oxygen, leg abduction wedge.

## 2018-04-01 DIAGNOSIS — E87.2 ACIDOSIS: ICD-10-CM

## 2018-04-01 DIAGNOSIS — D63.8 ANEMIA IN OTHER CHRONIC DISEASES CLASSIFIED ELSEWHERE: ICD-10-CM

## 2018-04-01 DIAGNOSIS — N17.9 ACUTE KIDNEY FAILURE, UNSPECIFIED: ICD-10-CM

## 2018-04-01 DIAGNOSIS — N39.0 URINARY TRACT INFECTION, SITE NOT SPECIFIED: ICD-10-CM

## 2018-04-01 DIAGNOSIS — E83.39 OTHER DISORDERS OF PHOSPHORUS METABOLISM: ICD-10-CM

## 2018-04-01 DIAGNOSIS — N18.4 CHRONIC KIDNEY DISEASE, STAGE 4 (SEVERE): ICD-10-CM

## 2018-04-01 DIAGNOSIS — I10 ESSENTIAL (PRIMARY) HYPERTENSION: ICD-10-CM

## 2018-04-01 LAB
-  AMIKACIN: SIGNIFICANT CHANGE UP
-  AMOXICILLIN/CLAVULANIC ACID: SIGNIFICANT CHANGE UP
-  AMPICILLIN/SULBACTAM: SIGNIFICANT CHANGE UP
-  AMPICILLIN: SIGNIFICANT CHANGE UP
-  AZTREONAM: SIGNIFICANT CHANGE UP
-  CEFAZOLIN: SIGNIFICANT CHANGE UP
-  CEFEPIME: SIGNIFICANT CHANGE UP
-  CEFOXITIN: SIGNIFICANT CHANGE UP
-  CEFTRIAXONE: SIGNIFICANT CHANGE UP
-  CIPROFLOXACIN: SIGNIFICANT CHANGE UP
-  ERTAPENEM: SIGNIFICANT CHANGE UP
-  GENTAMICIN: SIGNIFICANT CHANGE UP
-  IMIPENEM: SIGNIFICANT CHANGE UP
-  LEVOFLOXACIN: SIGNIFICANT CHANGE UP
-  MEROPENEM: SIGNIFICANT CHANGE UP
-  NITROFURANTOIN: SIGNIFICANT CHANGE UP
-  PIPERACILLIN/TAZOBACTAM: SIGNIFICANT CHANGE UP
-  TIGECYCLINE: SIGNIFICANT CHANGE UP
-  TOBRAMYCIN: SIGNIFICANT CHANGE UP
-  TRIMETHOPRIM/SULFAMETHOXAZOLE: SIGNIFICANT CHANGE UP
ANION GAP SERPL CALC-SCNC: 10 MMOL/L — SIGNIFICANT CHANGE UP (ref 5–17)
BUN SERPL-MCNC: 79 MG/DL — HIGH (ref 7–18)
CALCIUM SERPL-MCNC: 7.6 MG/DL — LOW (ref 8.4–10.5)
CHLORIDE SERPL-SCNC: 110 MMOL/L — HIGH (ref 96–108)
CO2 SERPL-SCNC: 17 MMOL/L — LOW (ref 22–31)
CREAT SERPL-MCNC: 3.69 MG/DL — HIGH (ref 0.5–1.3)
CULTURE RESULTS: SIGNIFICANT CHANGE UP
GLUCOSE SERPL-MCNC: 204 MG/DL — HIGH (ref 70–99)
HCT VFR BLD CALC: 23.5 % — LOW (ref 39–50)
HGB BLD-MCNC: 7.3 G/DL — LOW (ref 13–17)
MCHC RBC-ENTMCNC: 30.2 PG — SIGNIFICANT CHANGE UP (ref 27–34)
MCHC RBC-ENTMCNC: 31.1 GM/DL — LOW (ref 32–36)
MCV RBC AUTO: 96.9 FL — SIGNIFICANT CHANGE UP (ref 80–100)
METHOD TYPE: SIGNIFICANT CHANGE UP
ORGANISM # SPEC MICROSCOPIC CNT: SIGNIFICANT CHANGE UP
ORGANISM # SPEC MICROSCOPIC CNT: SIGNIFICANT CHANGE UP
PLATELET # BLD AUTO: 111 K/UL — LOW (ref 150–400)
POTASSIUM SERPL-MCNC: 5 MMOL/L — SIGNIFICANT CHANGE UP (ref 3.5–5.3)
POTASSIUM SERPL-SCNC: 5 MMOL/L — SIGNIFICANT CHANGE UP (ref 3.5–5.3)
RBC # BLD: 2.42 M/UL — LOW (ref 4.2–5.8)
RBC # FLD: 14 % — SIGNIFICANT CHANGE UP (ref 10.3–14.5)
SODIUM SERPL-SCNC: 137 MMOL/L — SIGNIFICANT CHANGE UP (ref 135–145)
SPECIMEN SOURCE: SIGNIFICANT CHANGE UP
WBC # BLD: 7.6 K/UL — SIGNIFICANT CHANGE UP (ref 3.8–10.5)
WBC # FLD AUTO: 7.6 K/UL — SIGNIFICANT CHANGE UP (ref 3.8–10.5)

## 2018-04-01 PROCEDURE — 74018 RADEX ABDOMEN 1 VIEW: CPT | Mod: 26

## 2018-04-01 RX ORDER — SODIUM CHLORIDE 9 MG/ML
1000 INJECTION INTRAMUSCULAR; INTRAVENOUS; SUBCUTANEOUS
Qty: 0 | Refills: 0 | Status: DISCONTINUED | OUTPATIENT
Start: 2018-04-01 | End: 2018-04-01

## 2018-04-01 RX ORDER — TRAMADOL HYDROCHLORIDE 50 MG/1
25 TABLET ORAL EVERY 8 HOURS
Qty: 0 | Refills: 0 | Status: DISCONTINUED | OUTPATIENT
Start: 2018-04-01 | End: 2018-04-02

## 2018-04-01 RX ORDER — CEFTRIAXONE 500 MG/1
INJECTION, POWDER, FOR SOLUTION INTRAMUSCULAR; INTRAVENOUS
Qty: 0 | Refills: 0 | Status: DISCONTINUED | OUTPATIENT
Start: 2018-04-01 | End: 2018-04-03

## 2018-04-01 RX ORDER — FUROSEMIDE 40 MG
40 TABLET ORAL ONCE
Qty: 0 | Refills: 0 | Status: COMPLETED | OUTPATIENT
Start: 2018-04-01 | End: 2018-04-01

## 2018-04-01 RX ORDER — SODIUM CHLORIDE 9 MG/ML
1000 INJECTION INTRAMUSCULAR; INTRAVENOUS; SUBCUTANEOUS
Qty: 0 | Refills: 0 | Status: DISCONTINUED | OUTPATIENT
Start: 2018-04-01 | End: 2018-04-02

## 2018-04-01 RX ORDER — INSULIN LISPRO 100/ML
3 VIAL (ML) SUBCUTANEOUS
Qty: 0 | Refills: 0 | Status: DISCONTINUED | OUTPATIENT
Start: 2018-04-01 | End: 2018-04-02

## 2018-04-01 RX ORDER — INSULIN GLARGINE 100 [IU]/ML
24 INJECTION, SOLUTION SUBCUTANEOUS EVERY MORNING
Qty: 0 | Refills: 0 | Status: DISCONTINUED | OUTPATIENT
Start: 2018-04-01 | End: 2018-04-02

## 2018-04-01 RX ORDER — CEFTRIAXONE 500 MG/1
1000 INJECTION, POWDER, FOR SOLUTION INTRAMUSCULAR; INTRAVENOUS ONCE
Qty: 0 | Refills: 0 | Status: COMPLETED | OUTPATIENT
Start: 2018-04-01 | End: 2018-04-01

## 2018-04-01 RX ORDER — BENZOCAINE AND MENTHOL 5; 1 G/100ML; G/100ML
1 LIQUID ORAL
Qty: 0 | Refills: 0 | Status: DISCONTINUED | OUTPATIENT
Start: 2018-04-01 | End: 2018-04-06

## 2018-04-01 RX ORDER — CEFTRIAXONE 500 MG/1
1000 INJECTION, POWDER, FOR SOLUTION INTRAMUSCULAR; INTRAVENOUS EVERY 24 HOURS
Qty: 0 | Refills: 0 | Status: DISCONTINUED | OUTPATIENT
Start: 2018-04-02 | End: 2018-04-03

## 2018-04-01 RX ORDER — ACETAMINOPHEN 500 MG
650 TABLET ORAL EVERY 6 HOURS
Qty: 0 | Refills: 0 | Status: DISCONTINUED | OUTPATIENT
Start: 2018-04-01 | End: 2018-04-06

## 2018-04-01 RX ADMIN — Medication 3: at 17:08

## 2018-04-01 RX ADMIN — AMLODIPINE BESYLATE 10 MILLIGRAM(S): 2.5 TABLET ORAL at 06:44

## 2018-04-01 RX ADMIN — CITALOPRAM 20 MILLIGRAM(S): 10 TABLET, FILM COATED ORAL at 12:23

## 2018-04-01 RX ADMIN — RANOLAZINE 500 MILLIGRAM(S): 500 TABLET, FILM COATED, EXTENDED RELEASE ORAL at 17:10

## 2018-04-01 RX ADMIN — Medication 2: at 12:23

## 2018-04-01 RX ADMIN — HEPARIN SODIUM 5000 UNIT(S): 5000 INJECTION INTRAVENOUS; SUBCUTANEOUS at 06:44

## 2018-04-01 RX ADMIN — HEPARIN SODIUM 5000 UNIT(S): 5000 INJECTION INTRAVENOUS; SUBCUTANEOUS at 17:10

## 2018-04-01 RX ADMIN — Medication 40 MILLIGRAM(S): at 15:58

## 2018-04-01 RX ADMIN — Medication 25 MILLIGRAM(S): at 06:44

## 2018-04-01 RX ADMIN — Medication 25 MILLIGRAM(S): at 17:10

## 2018-04-01 RX ADMIN — ISOSORBIDE MONONITRATE 30 MILLIGRAM(S): 60 TABLET, EXTENDED RELEASE ORAL at 12:22

## 2018-04-01 RX ADMIN — INSULIN GLARGINE 20 UNIT(S): 100 INJECTION, SOLUTION SUBCUTANEOUS at 08:22

## 2018-04-01 RX ADMIN — Medication 2: at 08:22

## 2018-04-01 RX ADMIN — FINASTERIDE 5 MILLIGRAM(S): 5 TABLET, FILM COATED ORAL at 12:23

## 2018-04-01 RX ADMIN — RANOLAZINE 500 MILLIGRAM(S): 500 TABLET, FILM COATED, EXTENDED RELEASE ORAL at 06:45

## 2018-04-01 NOTE — CONSULT NOTE ADULT - ATTENDING COMMENTS
I saw and evaluated pt in bed. Pt known to me from fixation of previous left hip fx. He is independent ambulator who fell at home c/o right hip fx.  Pt has pain on any right hip motion. Xrays or pelvis and right hip reviewed and shows a right displaced femoral neck fx.  For right hip hemiarthroplasty after medical evaluation and clearance. Discussed with pt about surgery, alternatives, and risks including medical, anesthesia, infection, blood clots, possible need for additional surgery, All questions answered.
please call with any questions, Dr. Floyd (cell: [phone])    NEPHROLOGY MEDICAL CARE, [facility]  MD Alejandro Mchugh MD Alexander Bangiev, MD Ljubisa Micic, MD    Answering Service: [phone]    Office: (Conrad Rodriguez MD)  [address] [place] [address]  Ph: [phone]  Fax: [phone]

## 2018-04-01 NOTE — PROGRESS NOTE ADULT - ATTENDING COMMENTS
I saw and evaluated pt POD#2 right hip hemiarthroplasty.  NGT was just inserted for probable ileus.  Awake and alert reporting mild right hip pain.  Right hip dressing dry and intact.   Some thigh swelling but not tense.  No calf tenderness and moving toes with sensation present.  Venodynes on.  Continue mobilization as able OOB to chair and PT ambulation WBAT  Discussed with pt and family at bedside.

## 2018-04-01 NOTE — CONSULT NOTE ADULT - PROBLEM SELECTOR RECOMMENDATION 9
LOBITO due to pre-renal azotemia due low perfusion to kidney from volume depletion and low hb complicated by UTI.  -start NS at 100cc/hr. place hannon's to monitor i/o's closely.  -recommend: urinalysis, urine lytes (uosm, urine sodium, urine creatinine, chloride, potassium), spot protein to creatinine ratio  -order renal sono to assess kidney size and r/o hydronephrosis.   -Adjust meds to eGFR and avoid IV Gadolinium contrast,NSAIDs, and phosphate enema.  -Monitor I/O's daily.   -Monitor SMA daily. LOBITO due to pre-renal azotemia due low perfusion to kidney from volume depletion and low hb complicated by UTI.  -start NS at 75cc/hr. place hannon's to monitor i/o's closely.  -recommend: urinalysis, urine lytes (uosm, urine sodium, urine creatinine, chloride, potassium), spot protein to creatinine ratio  -order renal sono to assess kidney size and r/o hydronephrosis.   -Adjust meds to eGFR and avoid IV Gadolinium contrast,NSAIDs, and phosphate enema.  -Monitor I/O's daily.   -Monitor SMA daily.

## 2018-04-01 NOTE — PROGRESS NOTE ADULT - SUBJECTIVE AND OBJECTIVE BOX
HPI:  Patient is a 89M from home, AAOx3, lives with wife, ambulates independently, wife at bedside for Qatari translation, with PMH HTN, DM, CAD, CABG 7 years ago, L Hip Fracture 8 years ago (s/p repair), and HLD presenting to the ED s/p mechanical fall over couch yesterday night 11pm, falling onto his R hip. Denies fever, chills, SOB, palpitations, chest pain, nausea, vomiting, diarrhea or constipation, only ROS + R hip pain s/p fracture. Denies syncope, LOC or head injury at time of fall.  s/p surg  post op  ortho/cardio f/up noted  PMH: as per HPI  Surgical Hx: L hip surgery  Fam Hx: mother and father- DM  Social Hx: neg for smoking or etoh  Allergies: NKDA (29 Mar 2018 14:30)      Meds:    Allergies:  Allergies    No Known Allergies    Intolerances        REVIEW OF SYSTEMS:  preet diet  no cp/no sob  CONSTITUTIONAL: No weakness, fevers or chills  EYES/ENT: No visual changes;  No vertigo or throat pain   NECK: No pain or stiffness  RESPIRATORY: No cough, wheezing, hemoptysis; No shortness of breath  CARDIOVASCULAR: No chest pain or palpitations  GASTROINTESTINAL: No abdominal or epigastric pain. No nausea, vomiting, or hematemesis; No diarrhea or constipation. No melena or hematochezia.  GENITOURINARY: No dysuria, frequency or hematuria  NEUROLOGICAL: No numbness or weakness  SKIN: No itching, burning, rashes, or lesions   All other review of systems is negative unless indicated above.    PHYSICAL EXAM:    Vital Signs Last 24 Hrs  T(C): 36.6 (01 Apr 2018 16:20), Max: 36.8 (01 Apr 2018 14:18)  T(F): 97.8 (01 Apr 2018 16:20), Max: 98.2 (01 Apr 2018 14:18)  HR: 69 (01 Apr 2018 16:20) (56 - 69)  BP: 116/45 (01 Apr 2018 16:20) (100/45 - 117/51)  BP(mean): --  RR: 17 (01 Apr 2018 16:20) (16 - 17)  SpO2: 99% (01 Apr 2018 16:20) (99% - 100%)    HEENT:elderly male  awake  lungs ae fair  cardia reg  abd soft  le as per ortho    Neck:  [  ] Supple  [  ] Lymphadenopathy  [  ] JVD  [  ] Masses  [  ] WNL    CHEST/Respiratory: [ ] Clear to auscultation     [  ] Rales      [  ] Rhonchi      [  ] Wheezing       [  ] Chest Tenderness     [  ] WNL    Cardiovascular:  [  ]S1 S2  [  ] Reg  [  ] Irreg   [  ] No Murmurs   [  ] Murmurs  [  ] Systolic [  ] Diastolic    Gastrointestinal:  [  ] Bowel Sounds  [   ] ABD Soft  [  ] ABD Distention   [  ] No Tenderness [  ] Tenderness  [  ] Organomegaly  [  ] Guarding rigidity  [  ] No Guarding rigidity  [  ] Rebound Tenderness [  ] No Rebound Tenderness    Extremities: [  ] Edema  [  ] No Edema  [  ] Clubbing   [  ] Cyanosis  [  ] Palpable peripheral pulses                          [  ] Tender calf muscles    [  ] No Tender Calf Muscles    Neurological:  [  ] Alert  [  ] Awake  [  ] Oriented  x                              [  ] Focal weakness  [  ] No Focal Weakness    Skin:  [  ] Thrombophlebitis  [  ] Rashes  [  ] Dry  [  ] Ulcers    Ortho:  [  ] Joint Swelling  [  ] Joint erythema [  ] DJD [  ] Increased Temperature to Touch      LABS/DIAGNOSTIC TESTS                          7.3    7.6   )-----------( 111      ( 01 Apr 2018 07:27 )             23.5         04-01    137  |  110<H>  |  79<H>  ----------------------------<  204<H>  5.0   |  17<L>  |  3.69<H>    Ca    7.6<L>      01 Apr 2018 07:27  Phos  5.2     03-31  Mg     2.0     03-31        CAPILLARY BLOOD GLUCOSE      POCT Blood Glucose.: 291 mg/dL (01 Apr 2018 16:21)  POCT Blood Glucose.: 232 mg/dL (01 Apr 2018 11:51)  POCT Blood Glucose.: 213 mg/dL (01 Apr 2018 07:52)  POCT Blood Glucose.: 254 mg/dL (31 Mar 2018 21:27)          Thyroid Stimulating Hormone, Serum: 2.77 uU/mL (03-30 @ 07:26)    CULTURES:       RADIOLOGY  CXR:    acetaminophen   Tablet 650 milliGRAM(s) Oral every 6 hours PRN  acetaminophen   Tablet. 650 milliGRAM(s) Oral every 6 hours PRN  amLODIPine   Tablet 10 milliGRAM(s) Oral daily  atorvastatin 40 milliGRAM(s) Oral at bedtime  citalopram 20 milliGRAM(s) Oral daily  finasteride 5 milliGRAM(s) Oral daily  heparin  Injectable 5000 Unit(s) SubCutaneous every 12 hours  insulin glargine Injectable (LANTUS) 20 Unit(s) SubCutaneous every morning  insulin lispro (HumaLOG) corrective regimen sliding scale   SubCutaneous three times a day before meals  isosorbide   mononitrate ER Tablet (IMDUR) 30 milliGRAM(s) Oral daily  lactated ringers. 1000 milliLiter(s) IV Continuous <Continuous>  magnesium hydroxide Suspension 30 milliLiter(s) Oral daily PRN  metoprolol tartrate 25 milliGRAM(s) Oral two times a day  ondansetron Injectable 4 milliGRAM(s) IV Push every 6 hours PRN  ranolazine 500 milliGRAM(s) Oral two times a day  tamsulosin 0.4 milliGRAM(s) Oral at bedtime  traMADol 25 milliGRAM(s) Oral every 8 hours PRN HPI:  Patient is a 89M from home, AAOx3, lives with wife, ambulates independently, wife at bedside for [place]ic translation, with PMH HTN, DM, CAD, CABG 7 years ago, L Hip Fracture 8 years ago (s/p repair), and HLD presenting to the ED s/p mechanical fall over couch yesterday night 11pm, falling onto his R hip. Denies fever, chills, SOB, palpitations, chest pain, nausea, vomiting, diarrhea or constipation, only ROS + R hip pain s/p fracture. Denies syncope, LOC or head injury at time of fall.  s/p surg  post op  ortho/cardio f/up noted  PMH: as per HPI  Surgical Hx: L hip surgery  Fam Hx: mother and father- DM  Social Hx: neg for smoking or etoh  Allergies: NKDA (29 Mar 2018 14:30)      Meds:    Allergies:  Allergies    No Known Allergies    Intolerances        REVIEW OF SYSTEMS:  ?vomiting once   passes gas  voiding  no cp/no sob  CONSTITUTIONAL: No weakness, fevers or chills  EYES/ENT: No visual changes;  No vertigo or throat pain   NECK: No pain or stiffness  RESPIRATORY: No cough, wheezing, hemoptysis; No shortness of breath  CARDIOVASCULAR: No chest pain or palpitations  GASTROINTESTINAL: No abdominal or epigastric pain. No nausea, vomiting, or hematemesis; No diarrhea or constipation. No melena or hematochezia.  GENITOURINARY: No dysuria, frequency or hematuria  NEUROLOGICAL: No numbness or weakness  SKIN: No itching, burning, rashes, or lesions   All other review of systems is negative unless indicated above.    PHYSICAL EXAM:    Vital Signs Last 24 Hrs  T(C): 36.6 (01 Apr 2018 16:20), Max: 36.8 (01 Apr 2018 14:18)  T(F): 97.8 (01 Apr 2018 16:20), Max: 98.2 (01 Apr 2018 14:18)  HR: 69 (01 Apr 2018 16:20) (56 - 69)  BP: 116/45 (01 Apr 2018 16:20) (100/45 - 117/51)  BP(mean): --  RR: 17 (01 Apr 2018 16:20) (16 - 17)  SpO2: 99% (01 Apr 2018 16:20) (99% - 100%)    HEENT:elderly male  awake  lungs ae fair  cardia reg  abd mild distension/non tender/no hypogastric fullness  le as per ortho    Neck:  [  ] Supple  [  ] Lymphadenopathy  [  ] JVD  [  ] Masses  [  ] WNL    CHEST/Respiratory: [ ] Clear to auscultation     [  ] Rales      [  ] Rhonchi      [  ] Wheezing       [  ] Chest Tenderness     [  ] WNL    Cardiovascular:  [  ]S1 S2  [  ] Reg  [  ] Irreg   [  ] No Murmurs   [  ] Murmurs  [  ] Systolic [  ] Diastolic    Gastrointestinal:  [  ] Bowel Sounds  [   ] ABD Soft  [  ] ABD Distention   [  ] No Tenderness [  ] Tenderness  [  ] Organomegaly  [  ] Guarding rigidity  [  ] No Guarding rigidity  [  ] Rebound Tenderness [  ] No Rebound Tenderness    Extremities: [  ] Edema  [  ] No Edema  [  ] Clubbing   [  ] Cyanosis  [  ] Palpable peripheral pulses                          [  ] Tender calf muscles    [  ] No Tender Calf Muscles    Neurological:  [  ] Alert  [  ] Awake  [  ] Oriented  x                              [  ] Focal weakness  [  ] No Focal Weakness    Skin:  [  ] Thrombophlebitis  [  ] Rashes  [  ] Dry  [  ] Ulcers    Ortho:  [  ] Joint Swelling  [  ] Joint erythema [  ] DJD [  ] Increased Temperature to Touch      LABS/DIAGNOSTIC TESTS                          7.3    7.6   )-----------( 111      ( 01 Apr 2018 07:27 )             23.5         04-01    137  |  110<H>  |  79<H>  ----------------------------<  204<H>  5.0   |  17<L>  |  3.69<H>    Ca    7.6<L>      01 Apr 2018 07:27  Phos  5.2     03-31  Mg     2.0     03-31        CAPILLARY BLOOD GLUCOSE      POCT Blood Glucose.: 291 mg/dL (01 Apr 2018 16:21)  POCT Blood Glucose.: 232 mg/dL (01 Apr 2018 11:51)  POCT Blood Glucose.: 213 mg/dL (01 Apr 2018 07:52)  POCT Blood Glucose.: 254 mg/dL (31 Mar 2018 21:27)          Thyroid Stimulating Hormone, Serum: 2.77 uU/mL (03-30 @ 07:26)    CULTURES:       RADIOLOGY  CXR:    acetaminophen   Tablet 650 milliGRAM(s) Oral every 6 hours PRN  acetaminophen   Tablet. 650 milliGRAM(s) Oral every 6 hours PRN  amLODIPine   Tablet 10 milliGRAM(s) Oral daily  atorvastatin 40 milliGRAM(s) Oral at bedtime  citalopram 20 milliGRAM(s) Oral daily  finasteride 5 milliGRAM(s) Oral daily  heparin  Injectable 5000 Unit(s) SubCutaneous every 12 hours  insulin glargine Injectable (LANTUS) 20 Unit(s) SubCutaneous every morning  insulin lispro (HumaLOG) corrective regimen sliding scale   SubCutaneous three times a day before meals  isosorbide   mononitrate ER Tablet (IMDUR) 30 milliGRAM(s) Oral daily  lactated ringers. 1000 milliLiter(s) IV Continuous <Continuous>  magnesium hydroxide Suspension 30 milliLiter(s) Oral daily PRN  metoprolol tartrate 25 milliGRAM(s) Oral two times a day  ondansetron Injectable 4 milliGRAM(s) IV Push every 6 hours PRN  ranolazine 500 milliGRAM(s) Oral two times a day  tamsulosin 0.4 milliGRAM(s) Oral at bedtime  traMADol 25 milliGRAM(s) Oral every 8 hours PRN

## 2018-04-01 NOTE — PROGRESS NOTE ADULT - SUBJECTIVE AND OBJECTIVE BOX
MARCELLA NIEVES  89y [id number]      Diagnosis:  S/p Right Hip Hemiarthroplasty POD# 2    Patient is seen and evaluated at bedside; offers no acute complaints. Pain is mild; well controlled.  Awaiting PT for ambulation.    Denies CP/SOB, palpitations, dyspnea, paresthesias, N/V    Vital Signs Last 24 Hrs  T(C): 36.3 (01 Apr 2018 06:00), Max: 36.4 (31 Mar 2018 14:27)  T(F): 97.4 (01 Apr 2018 06:00), Max: 97.6 (31 Mar 2018 20:35)  HR: 59 (01 Apr 2018 06:00) (56 - 75)  BP: 100/45 (01 Apr 2018 06:00) (98/41 - 123/55)  BP(mean): --  RR: 16 (01 Apr 2018 06:00) (16 - 16)  SpO2: 100% (01 Apr 2018 06:00) (96% - 100%)        Physical Exam:    General: AAOx3, in NAD, resting comfortably in bed.    Right hip:  In nl. alignment. Abduction pillow intact. Dressing is C/D/I.  Skin pink, warm.  Lower ext: No CT, calves soft, 2+pedal pulses. NVI. 5/5 strength of ehl/ta/gs b/l.                                      7.3    7.6   )-----------( 111      ( 01 Apr 2018 07:27 )             23.5       04-01    137  |  110<H>  |  79<H>  ----------------------------<  204<H>  5.0   |  17<L>  |  3.69<H>    Ca    7.6<L>      01 Apr 2018 07:27  Phos  5.2     03-31  Mg     2.0     03-31          Impression:  89yFemale S/p Right Hip Hemiarthroplasty POD# 2  Plan:  -  Continue pain management  -  DVT prophylaxis with Lovenox  -  Daily Physical Therapy:  WBAT on RLE with walker  - *Total hip precautions (bedside commode, trapeze bar, high chair, abduction pillow between legs when in bed)  -  Discharge planning: Home Vs. Rehab pending Physical therapy eval.  - Acute blood loss anemia- 2 units PRBC - consented  -  Encouraged use of incentive spirometer  -  Case d/w Dr. Sosa   -  Orthopedically stable - D/c Lovenox on staples on 4/14  -  F/u with Dr Sosa upon discharge

## 2018-04-01 NOTE — CONSULT NOTE ADULT - PROBLEM SELECTOR RECOMMENDATION 2
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   -order renal sono  -Keep patient euvolemic and renal diet  -Avoid Nephrotoxic Meds/ Agents such as (NSAIDs, IV contrast, Aminoglycosides such as gentamicin, -Gadolinium contrast, Phosphate containing enemas, etc..)  -Adjust Medications according to eGFR

## 2018-04-01 NOTE — CONSULT NOTE ADULT - PROBLEM SELECTOR RECOMMENDATION 3
acute drop in hb and r/o occult bleeding; s/p 1 unit of prbc and was given laxis already.   -F/u CBC daily  -tranfuse if HB < 7.0.

## 2018-04-01 NOTE — PROGRESS NOTE ADULT - SUBJECTIVE AND OBJECTIVE BOX
cardiology    PROGRESS  NOTE   ________________________________________________    CHIEF COMPLAINT:Patient is a 89y old  Male who presents with a chief complaint of s/p fall (29 Mar 2018 14:30)    	  REVIEW OF SYSTEMS:  CONSTITUTIONAL: No fever, weight loss, or fatigue  EYES: No eye pain, visual disturbances, or discharge  ENT:  No difficulty hearing, tinnitus, vertigo; No sinus or throat pain  NECK: No pain or stiffness  RESPIRATORY: No cough, wheezing, chills or hemoptysis; No Shortness of Breath  CARDIOVASCULAR: No chest pain, palpitations, passing out, dizziness, or leg swelling  GASTROINTESTINAL: No abdominal or epigastric pain. No nausea, vomiting, or hematemesis; No diarrhea or constipation. No melena or hematochezia.  GENITOURINARY: No dysuria, frequency, hematuria, or incontinence  NEUROLOGICAL: No headaches, memory loss, loss of strength, numbness, or tremors  SKIN: No itching, burning, rashes, or lesions   LYMPH Nodes: No enlarged glands  ENDOCRINE: No heat or cold intolerance; No hair loss  MUSCULOSKELETAL: No joint pain or swelling; No muscle, back, or extremity pain  PSYCHIATRIC: No depression, anxiety, mood swings, or difficulty sleeping  HEME/LYMPH: No easy bruising, or bleeding gums  ALLERGY AND IMMUNOLOGIC: No hives or eczema	    [ ] All others negative	  [ ] Unable to obtain    PHYSICAL EXAM:  T(C): 36.3 (04-01-18 @ 06:00), Max: 36.4 (03-31-18 @ 14:27)  HR: 59 (04-01-18 @ 06:00) (56 - 75)  BP: 100/45 (04-01-18 @ 06:00) (98/41 - 123/55)  RR: 16 (04-01-18 @ 06:00) (16 - 16)  SpO2: 100% (04-01-18 @ 06:00) (96% - 100%)  Wt(kg): --  I&O's Summary    31 Mar 2018 07:01  -  01 Apr 2018 07:00  --------------------------------------------------------  IN: 100 mL / OUT: 500 mL / NET: -400 mL        Appearance: Normal	  HEENT:   Normal oral mucosa, PERRL, EOMI	  Lymphatic: No lymphadenopathy  Cardiovascular: Normal S1 S2, No JVD, + murmurs, No edema  Respiratory: Lungs clear to auscultation	  Psychiatry: A & O x 3, Mood & affect appropriate  Gastrointestinal:  Soft, Non-tender, + BS	  Skin: No rashes, No ecchymoses, No cyanosis	  Neurologic: Non-focal  Extremities: Normal range of motion, No clubbing, cyanosis or edema  Vascular: Peripheral pulses palpable 2+ bilaterally    MEDICATIONS  (STANDING):  amLODIPine   Tablet 10 milliGRAM(s) Oral daily  atorvastatin 40 milliGRAM(s) Oral at bedtime  citalopram 20 milliGRAM(s) Oral daily  finasteride 5 milliGRAM(s) Oral daily  furosemide   Injectable 40 milliGRAM(s) IV Push once  heparin  Injectable 5000 Unit(s) SubCutaneous every 12 hours  insulin glargine Injectable (LANTUS) 20 Unit(s) SubCutaneous every morning  insulin lispro (HumaLOG) corrective regimen sliding scale   SubCutaneous three times a day before meals  isosorbide   mononitrate ER Tablet (IMDUR) 30 milliGRAM(s) Oral daily  lactated ringers. 1000 milliLiter(s) (100 mL/Hr) IV Continuous <Continuous>  metoprolol tartrate 25 milliGRAM(s) Oral two times a day  ranolazine 500 milliGRAM(s) Oral two times a day  tamsulosin 0.4 milliGRAM(s) Oral at bedtime      TELEMETRY: 	    ECG:  	  RADIOLOGY:  OTHER: 	  	  LABS:	 	    CARDIAC MARKERS:                                7.3    7.6   )-----------( 111      ( 01 Apr 2018 07:27 )             23.5     04-01    137  |  110<H>  |  79<H>  ----------------------------<  204<H>  5.0   |  17<L>  |  3.69<H>    Ca    7.6<L>      01 Apr 2018 07:27  Phos  5.2     03-31  Mg     2.0     03-31      proBNP:   Lipid Profile: Cholesterol 120  LDL 39  HDL 55      HgA1c:   TSH: Thyroid Stimulating Hormone, Serum: 2.77 uU/mL (03-30 @ 07:26)    89 yr old male with PMHX of CAD,HTN,Lipid D/O,Anemia,CRI,DM who presents s/p fall with RT hip pain.  1.Pt at moderate risk for franklin-operative cardiac complication, doing well post op.  2.HTN and CAD-asa,lopressor.  3.Anemia-IV Iron.  4.CRI-F/U lytes.  5.DM-Insulin.  6.Pain control.  7.GI and DVT prophylaxis.

## 2018-04-01 NOTE — CONSULT NOTE ADULT - SUBJECTIVE AND OBJECTIVE BOX
89y/o man s/p hemiarthroplasty of right hip, POD # 2, c/o abdominal pain, distention, nausea and vomiting since this afternoon. Pt denies flatus or BM since surgery. Pain intermittent periumbilical to diffuse.      PAST MEDICAL & SURGICAL HISTORY:  HLD (hyperlipidemia)  Hip fracture, left  CAD (coronary artery disease): s/p by pass surgery  Diabetes  Hypertension  History of hip surgery      MEDICATIONS  (STANDING):  amLODIPine   Tablet 10 milliGRAM(s) Oral daily  atorvastatin 40 milliGRAM(s) Oral at bedtime  citalopram 20 milliGRAM(s) Oral daily  finasteride 5 milliGRAM(s) Oral daily  heparin  Injectable 5000 Unit(s) SubCutaneous every 12 hours  insulin glargine Injectable (LANTUS) 24 Unit(s) SubCutaneous every morning  insulin lispro (HumaLOG) corrective regimen sliding scale   SubCutaneous three times a day before meals  insulin lispro Injectable (HumaLOG) 3 Unit(s) SubCutaneous three times a day before meals  isosorbide   mononitrate ER Tablet (IMDUR) 30 milliGRAM(s) Oral daily  lactated ringers. 1000 milliLiter(s) (100 mL/Hr) IV Continuous <Continuous>  metoprolol tartrate 25 milliGRAM(s) Oral two times a day  ranolazine 500 milliGRAM(s) Oral two times a day  tamsulosin 0.4 milliGRAM(s) Oral at bedtime    MEDICATIONS  (PRN):  acetaminophen   Tablet 650 milliGRAM(s) Oral every 6 hours PRN For Temp over 38.3 C (100.94 F)  acetaminophen   Tablet. 650 milliGRAM(s) Oral every 6 hours PRN Mild Pain (1 - 3)  magnesium hydroxide Suspension 30 milliLiter(s) Oral daily PRN Constipation  ondansetron Injectable 4 milliGRAM(s) IV Push every 6 hours PRN Nausea and/or Vomiting  traMADol 25 milliGRAM(s) Oral every 8 hours PRN Moderate Pain (4 - 6)      PE: elderly man resting on bed, awake, responsive, NAD    Vital Signs Last 24 Hrs  T(C): 36.6 (01 Apr 2018 16:20), Max: 36.8 (01 Apr 2018 14:18)  T(F): 97.8 (01 Apr 2018 16:20), Max: 98.2 (01 Apr 2018 14:18)  HR: 69 (01 Apr 2018 16:20) (59 - 69)  BP: 116/45 (01 Apr 2018 16:20) (100/45 - 117/51)  BP(mean): --  RR: 17 (01 Apr 2018 16:20) (16 - 17)  SpO2: 99% (01 Apr 2018 16:20) (99% - 100%)    Abdomen: softly distended with tympany, mild tenderness, no guarding or rebound, no BS    NGT was placed, initially drained 300 cc brownish bilious fluid                            7.3    7.6   )-----------( 111      ( 01 Apr 2018 07:27 )             23.5     04-01    137  |  110<H>  |  79<H>  ----------------------------<  204<H>  5.0   |  17<L>  |  3.69<H>    Ca    7.6<L>      01 Apr 2018 07:27  Phos  5.2     03-31  Mg     2.0     03-31

## 2018-04-01 NOTE — CONSULT NOTE ADULT - ASSESSMENT
89y/o man s/p right hemiarthroplasty  Baylee post-op ileus    -NPO, IVF  -NGT to LCWS  -Seral abdominal exams  -AXR  -Minimize narcotics 89y/o man s/p right hemiarthroplasty  Baylee post-op ileus    -NPO, IVF  -NGT to LCWS  -Seral abdominal exams  -AXR  -Minimize narcotics  -D/W Dr Vázquez

## 2018-04-01 NOTE — CONSULT NOTE ADULT - SUBJECTIVE AND OBJECTIVE BOX
HPI:  89M from home, AAOx3, lives with wife, ambulates independently with PMH HTN, DM, CAD, CKD stage 4, s/p CABG (7 years ago), L Hip Fracture 8 years ago (s/p repair), and HLD presenting to the ED s/p mechanical fall and fx of Rt hip. Pt was admitted with scr around 3.06 and spoke with pcp and his baseline scr around 2.2 to 2.5mg/dL. The scr has been increasing through the hospital stay with scr 3.69mg/dL today.  pt didn't have any contrast imaging during the hospital stay however patient's hb dropped and also his blood pressure is on low normal. today, pt developed nausea and vomiting and developed post op ileus with ngt placed. Denies fever, chills, SOB, palpitations, chest pain      PMH:   HLD (hyperlipidemia)  Hip fracture, left  CAD (coronary artery disease)  Diabetes  Hypertension  ckd  cabg      PSH:   History of hip surgery        FAMILY HISTORY:  mother and father- DM    Social History:  non-smoker/ non-alcoholic     Home Meds:  MEDICATIONS  (STANDING):  atorvastatin 40 milliGRAM(s) Oral at bedtime  citalopram 20 milliGRAM(s) Oral daily  finasteride 5 milliGRAM(s) Oral daily  heparin  Injectable 5000 Unit(s) SubCutaneous every 12 hours  insulin glargine Injectable (LANTUS) 24 Unit(s) SubCutaneous every morning  insulin lispro (HumaLOG) corrective regimen sliding scale   SubCutaneous three times a day before meals  insulin lispro Injectable (HumaLOG) 3 Unit(s) SubCutaneous three times a day before meals  isosorbide   mononitrate ER Tablet (IMDUR) 30 milliGRAM(s) Oral daily  metoprolol tartrate 25 milliGRAM(s) Oral two times a day  ranolazine 500 milliGRAM(s) Oral two times a day  sodium chloride 0.9%. 1000 milliLiter(s) (75 mL/Hr) IV Continuous <Continuous>  tamsulosin 0.4 milliGRAM(s) Oral at bedtime    MEDICATIONS  (PRN):  acetaminophen   Tablet 650 milliGRAM(s) Oral every 6 hours PRN For Temp over 38.3 C (100.94 F)  acetaminophen   Tablet. 650 milliGRAM(s) Oral every 6 hours PRN Mild Pain (1 - 3)  magnesium hydroxide Suspension 30 milliLiter(s) Oral daily PRN Constipation  ondansetron Injectable 4 milliGRAM(s) IV Push every 6 hours PRN Nausea and/or Vomiting  traMADol 25 milliGRAM(s) Oral every 8 hours PRN Moderate Pain (4 - 6)      Allergies:  Allergies    No Known Allergies    Intolerances        REVIEW OF SYSTEMS:  CONSTITUTIONAL: No fever, weight loss, or fatigue  EYES: No eye pain, visual disturbances, or discharge  ENMT:  No difficulty hearing, tinnitus, vertigo; No sinus or throat pain  NECK: No pain or stiffness  BREASTS: No pain, masses, or nipple discharge  RESPIRATORY: No cough, wheezing, chills or hemoptysis; No shortness of breath  CARDIOVASCULAR: No chest pain, palpitations, dizziness, or leg swelling  GASTROINTESTINAL: No abdominal or epigastric pain. nausea, vomiting, present and no hematemesis; No diarrhea or constipation. No melena or hematochezia.  GENITOURINARY: No dysuria, frequency, hematuria, or incontinence  NEUROLOGICAL: No headaches, memory loss, loss of strength, numbness, or tremors  SKIN: No itching, burning, rashes, or lesions   ENDOCRINE: No heat or cold intolerance; No hair loss  MUSCULOSKELETAL: No joint pain or swelling; No muscle, back pain  PSYCHIATRIC: No depression, anxiety, mood swings, or difficulty sleeping  HEME/LYMPH: No easy bruising, or bleeding gums  ALLERY AND IMMUNOLOGIC: No hives or eczema    Vital Signs Last 24 Hrs  T(C): 36.6 (01 Apr 2018 16:20), Max: 36.8 (01 Apr 2018 14:18)  T(F): 97.8 (01 Apr 2018 16:20), Max: 98.2 (01 Apr 2018 14:18)  HR: 69 (01 Apr 2018 16:20) (59 - 69)  BP: 116/45 (01 Apr 2018 16:20) (100/45 - 117/51)  BP(mean): --  RR: 17 (01 Apr 2018 16:20) (16 - 17)  SpO2: 99% (01 Apr 2018 16:20) (99% - 100%)    03-31 @ 07:01  -  04-01 @ 07:00  --------------------------------------------------------  IN: 100 mL / OUT: 500 mL / NET: -400 mL        PHYSICAL EXAM:  GENERAL: No acute respiratory distress.  HEAD:  Atraumatic, Normocephalic  EYES: conjunctiva and sclera clear  ENMT: dry mucous membranes; ngt  NECK: Supple, No JVD  NERVOUS SYSTEM:  Awake, Alert & Oriented X3, No focal deficits present.   CHEST/LUNG: Clear to percussion bilaterally; No rales, rhonchi, wheezing, or rubs  HEART: Regular rate and rhythm; No murmurs, rubs, or gallops  ABDOMEN: firm, Non-tender, distended; hypoactive Bowel sounds   EXTREMITIES:  2+ Peripheral Pulses, No cyanosis, or edema  SKIN: No rashes or lesions    LABS:                        7.3    7.6   )-----------( 111      ( 01 Apr 2018 07:27 )             23.5     04-01    137  |  110<H>  |  79<H>  ----------------------------<  204<H>  5.0   |  17<L>  |  3.69<H>    Ca    7.6<L>      01 Apr 2018 07:27  Phos  5.2     03-31  Mg     2.0     03-31            Urine studies  Osmolality, Random Urine: 447 mos/kg (03-29 @ 23:04)  Sodium, Random Urine: 69 mmol/L (03-29 @ 23:04)  Creatinine, Random Urine: 57 mg/dL (03-29 @ 23:04)      Medications:  acetaminophen   Tablet 650 milliGRAM(s) Oral every 6 hours PRN  acetaminophen   Tablet. 650 milliGRAM(s) Oral every 6 hours PRN  atorvastatin 40 milliGRAM(s) Oral at bedtime  citalopram 20 milliGRAM(s) Oral daily  finasteride 5 milliGRAM(s) Oral daily  heparin  Injectable 5000 Unit(s) SubCutaneous every 12 hours  insulin glargine Injectable (LANTUS) 24 Unit(s) SubCutaneous every morning  insulin lispro (HumaLOG) corrective regimen sliding scale   SubCutaneous three times a day before meals  insulin lispro Injectable (HumaLOG) 3 Unit(s) SubCutaneous three times a day before meals  isosorbide   mononitrate ER Tablet (IMDUR) 30 milliGRAM(s) Oral daily  magnesium hydroxide Suspension 30 milliLiter(s) Oral daily PRN  metoprolol tartrate 25 milliGRAM(s) Oral two times a day  ondansetron Injectable 4 milliGRAM(s) IV Push every 6 hours PRN  ranolazine 500 milliGRAM(s) Oral two times a day  sodium chloride 0.9%. 1000 milliLiter(s) IV Continuous <Continuous>  tamsulosin 0.4 milliGRAM(s) Oral at bedtime  traMADol 25 milliGRAM(s) Oral every 8 hours PRN      RADIOLOGY & ADDITIONAL TESTS:

## 2018-04-02 LAB
ANION GAP SERPL CALC-SCNC: 11 MMOL/L — SIGNIFICANT CHANGE UP (ref 5–17)
APPEARANCE UR: CLEAR — SIGNIFICANT CHANGE UP
BASOPHILS # BLD AUTO: 0 K/UL — SIGNIFICANT CHANGE UP (ref 0–0.2)
BASOPHILS NFR BLD AUTO: 0.3 % — SIGNIFICANT CHANGE UP (ref 0–2)
BILIRUB UR-MCNC: NEGATIVE — SIGNIFICANT CHANGE UP
BUN SERPL-MCNC: 91 MG/DL — HIGH (ref 7–18)
CALCIUM SERPL-MCNC: 7.8 MG/DL — LOW (ref 8.4–10.5)
CALCIUM SERPL-MCNC: 7.9 MG/DL — LOW (ref 8.4–10.5)
CHLORIDE SERPL-SCNC: 111 MMOL/L — HIGH (ref 96–108)
CO2 SERPL-SCNC: 16 MMOL/L — LOW (ref 22–31)
COLOR SPEC: YELLOW — SIGNIFICANT CHANGE UP
CREAT ?TM UR-MCNC: 37 MG/DL — SIGNIFICANT CHANGE UP
CREAT SERPL-MCNC: 3.83 MG/DL — HIGH (ref 0.5–1.3)
DIFF PNL FLD: ABNORMAL
EOSINOPHIL # BLD AUTO: 0.1 K/UL — SIGNIFICANT CHANGE UP (ref 0–0.5)
EOSINOPHIL NFR BLD AUTO: 0.8 % — SIGNIFICANT CHANGE UP (ref 0–6)
GLUCOSE SERPL-MCNC: 281 MG/DL — HIGH (ref 70–99)
GLUCOSE UR QL: NEGATIVE — SIGNIFICANT CHANGE UP
HBA1C BLD-MCNC: 5.6 % — SIGNIFICANT CHANGE UP (ref 4–5.6)
HCT VFR BLD CALC: 28.5 % — LOW (ref 39–50)
HGB BLD-MCNC: 9.2 G/DL — LOW (ref 13–17)
KETONES UR-MCNC: NEGATIVE — SIGNIFICANT CHANGE UP
LEUKOCYTE ESTERASE UR-ACNC: ABNORMAL
LYMPHOCYTES # BLD AUTO: 0.7 K/UL — LOW (ref 1–3.3)
LYMPHOCYTES # BLD AUTO: 9.6 % — LOW (ref 13–44)
MAGNESIUM SERPL-MCNC: 2.1 MG/DL — SIGNIFICANT CHANGE UP (ref 1.6–2.6)
MCHC RBC-ENTMCNC: 29.6 PG — SIGNIFICANT CHANGE UP (ref 27–34)
MCHC RBC-ENTMCNC: 32.3 GM/DL — SIGNIFICANT CHANGE UP (ref 32–36)
MCV RBC AUTO: 91.7 FL — SIGNIFICANT CHANGE UP (ref 80–100)
MONOCYTES # BLD AUTO: 0.6 K/UL — SIGNIFICANT CHANGE UP (ref 0–0.9)
MONOCYTES NFR BLD AUTO: 7.7 % — SIGNIFICANT CHANGE UP (ref 2–14)
NEUTROPHILS # BLD AUTO: 5.8 K/UL — SIGNIFICANT CHANGE UP (ref 1.8–7.4)
NEUTROPHILS NFR BLD AUTO: 81.4 % — HIGH (ref 43–77)
NITRITE UR-MCNC: NEGATIVE — SIGNIFICANT CHANGE UP
OSMOLALITY UR: 400 MOS/KG — SIGNIFICANT CHANGE UP (ref 50–1200)
PH UR: 5 — SIGNIFICANT CHANGE UP (ref 5–8)
PHOSPHATE SERPL-MCNC: 3.9 MG/DL — SIGNIFICANT CHANGE UP (ref 2.5–4.5)
PLATELET # BLD AUTO: 110 K/UL — LOW (ref 150–400)
POTASSIUM SERPL-MCNC: 4.1 MMOL/L — SIGNIFICANT CHANGE UP (ref 3.5–5.3)
POTASSIUM SERPL-SCNC: 4.1 MMOL/L — SIGNIFICANT CHANGE UP (ref 3.5–5.3)
POTASSIUM UR-SCNC: 18 MMOL/L — LOW (ref 25–125)
PROT ?TM UR-MCNC: 69 MG/DL — HIGH (ref 0–12)
PROT UR-MCNC: 30 MG/DL
PTH-INTACT FLD-MCNC: 120 PG/ML — HIGH (ref 15–65)
RBC # BLD: 3.1 M/UL — LOW (ref 4.2–5.8)
RBC # FLD: 14.2 % — SIGNIFICANT CHANGE UP (ref 10.3–14.5)
SODIUM SERPL-SCNC: 138 MMOL/L — SIGNIFICANT CHANGE UP (ref 135–145)
SODIUM UR-SCNC: 75 MMOL/L — SIGNIFICANT CHANGE UP (ref 40–220)
SP GR SPEC: 1.01 — SIGNIFICANT CHANGE UP (ref 1.01–1.02)
UROBILINOGEN FLD QL: NEGATIVE — SIGNIFICANT CHANGE UP
WBC # BLD: 7.2 K/UL — SIGNIFICANT CHANGE UP (ref 3.8–10.5)
WBC # FLD AUTO: 7.2 K/UL — SIGNIFICANT CHANGE UP (ref 3.8–10.5)

## 2018-04-02 PROCEDURE — 74018 RADEX ABDOMEN 1 VIEW: CPT | Mod: 26

## 2018-04-02 PROCEDURE — 99231 SBSQ HOSP IP/OBS SF/LOW 25: CPT

## 2018-04-02 RX ORDER — INSULIN LISPRO 100/ML
VIAL (ML) SUBCUTANEOUS EVERY 4 HOURS
Qty: 0 | Refills: 0 | Status: DISCONTINUED | OUTPATIENT
Start: 2018-04-02 | End: 2018-04-06

## 2018-04-02 RX ORDER — INSULIN GLARGINE 100 [IU]/ML
15 INJECTION, SOLUTION SUBCUTANEOUS EVERY MORNING
Qty: 0 | Refills: 0 | Status: DISCONTINUED | OUTPATIENT
Start: 2018-04-02 | End: 2018-04-05

## 2018-04-02 RX ORDER — ACETAMINOPHEN 500 MG
1000 TABLET ORAL ONCE
Qty: 0 | Refills: 0 | Status: DISCONTINUED | OUTPATIENT
Start: 2018-04-02 | End: 2018-04-06

## 2018-04-02 RX ORDER — SODIUM CHLORIDE 9 MG/ML
1000 INJECTION INTRAMUSCULAR; INTRAVENOUS; SUBCUTANEOUS
Qty: 0 | Refills: 0 | Status: DISCONTINUED | OUTPATIENT
Start: 2018-04-02 | End: 2018-04-04

## 2018-04-02 RX ADMIN — Medication 2: at 17:08

## 2018-04-02 RX ADMIN — INSULIN GLARGINE 24 UNIT(S): 100 INJECTION, SOLUTION SUBCUTANEOUS at 08:17

## 2018-04-02 RX ADMIN — Medication 3 UNIT(S): at 17:08

## 2018-04-02 RX ADMIN — TAMSULOSIN HYDROCHLORIDE 0.4 MILLIGRAM(S): 0.4 CAPSULE ORAL at 22:44

## 2018-04-02 RX ADMIN — RANOLAZINE 500 MILLIGRAM(S): 500 TABLET, FILM COATED, EXTENDED RELEASE ORAL at 17:12

## 2018-04-02 RX ADMIN — HEPARIN SODIUM 5000 UNIT(S): 5000 INJECTION INTRAVENOUS; SUBCUTANEOUS at 17:09

## 2018-04-02 RX ADMIN — FINASTERIDE 5 MILLIGRAM(S): 5 TABLET, FILM COATED ORAL at 12:27

## 2018-04-02 RX ADMIN — CITALOPRAM 20 MILLIGRAM(S): 10 TABLET, FILM COATED ORAL at 12:26

## 2018-04-02 RX ADMIN — ATORVASTATIN CALCIUM 40 MILLIGRAM(S): 80 TABLET, FILM COATED ORAL at 22:44

## 2018-04-02 RX ADMIN — Medication 1 ENEMA: at 14:32

## 2018-04-02 RX ADMIN — ISOSORBIDE MONONITRATE 30 MILLIGRAM(S): 60 TABLET, EXTENDED RELEASE ORAL at 12:27

## 2018-04-02 RX ADMIN — Medication 25 MILLIGRAM(S): at 17:09

## 2018-04-02 RX ADMIN — Medication 4: at 06:12

## 2018-04-02 RX ADMIN — CEFTRIAXONE 1000 MILLIGRAM(S): 500 INJECTION, POWDER, FOR SOLUTION INTRAMUSCULAR; INTRAVENOUS at 00:14

## 2018-04-02 RX ADMIN — Medication 3 UNIT(S): at 08:16

## 2018-04-02 RX ADMIN — Medication 2: at 12:26

## 2018-04-02 RX ADMIN — Medication 3 UNIT(S): at 12:25

## 2018-04-02 RX ADMIN — HEPARIN SODIUM 5000 UNIT(S): 5000 INJECTION INTRAVENOUS; SUBCUTANEOUS at 05:18

## 2018-04-02 NOTE — PROGRESS NOTE ADULT - SUBJECTIVE AND OBJECTIVE BOX
HPI:  Patient is a 89M from home, AAOx3, lives with wife, ambulates independently, wife at bedside for [place] translation, with PMH HTN, DM, CAD, CABG 7 years ago, L Hip Fracture 8 years ago (s/p repair), and HLD presenting to the ED s/p mechanical fall over couch yesterday night 11pm, falling onto his R hip. Denies fever, chills, SOB, palpitations, chest pain, nausea, vomiting, diarrhea or constipation, only ROS + R hip pain s/p fracture. Denies syncope, LOC or head injury at time of fall.  hosp course reviewed  s/p hip surg  now with ileus  npo  PMH: as per HPI  Surgical Hx: L hip surgery  Fam Hx: mother and father- DM  Social Hx: neg for smoking or etoh  Allergies: NKDA (29 Mar 2018 14:30)      Meds:  cefTRIAXone Injectable.      cefTRIAXone Injectable. 1000 milliGRAM(s) IV Push every 24 hours    Allergies:  Allergies    No Known Allergies    Intolerances        REVIEW OF SYSTEMS:  npo  no vomiting  no pain    CONSTITUTIONAL: No weakness, fevers or chills  EYES/ENT: No visual changes;  No vertigo or throat pain   NECK: No pain or stiffness  RESPIRATORY: No cough, wheezing, hemoptysis; No shortness of breath  CARDIOVASCULAR: No chest pain or palpitations  GASTROINTESTINAL: No abdominal or epigastric pain. No nausea, vomiting, or hematemesis; No diarrhea or constipation. No melena or hematochezia.  GENITOURINARY: No dysuria, frequency or hematuria  NEUROLOGICAL: No numbness or weakness  SKIN: No itching, burning, rashes, or lesions   All other review of systems is negative unless indicated above.    PHYSICAL EXAM:    Vital Signs Last 24 Hrs  T(C): 36.7 (02 Apr 2018 14:00), Max: 36.7 (01 Apr 2018 20:00)  T(F): 98 (02 Apr 2018 14:00), Max: 98.1 (01 Apr 2018 20:00)  HR: 73 (02 Apr 2018 14:26) (70 - 73)  BP: 137/59 (02 Apr 2018 14:26) (129/52 - 142/58)  BP(mean): --  RR: 179 (02 Apr 2018 14:00) (17 - 179)  SpO2: 100% (02 Apr 2018 14:26) (98% - 100%)    HEENT:awake  ngt  npo  lungs ae reduced  cardia reg  abd fullness    Neck:  [  ] Supple  [  ] Lymphadenopathy  [  ] JVD  [  ] Masses  [  ] WNL    CHEST/Respiratory: [ ] Clear to auscultation     [  ] Rales      [  ] Rhonchi      [  ] Wheezing       [  ] Chest Tenderness     [  ] WNL    Cardiovascular:  [  ]S1 S2  [  ] Reg  [  ] Irreg   [  ] No Murmurs   [  ] Murmurs  [  ] Systolic [  ] Diastolic    Gastrointestinal:  [  ] Bowel Sounds  [   ] ABD Soft  [  ] ABD Distention   [  ] No Tenderness [  ] Tenderness  [  ] Organomegaly  [  ] Guarding rigidity  [  ] No Guarding rigidity  [  ] Rebound Tenderness [  ] No Rebound Tenderness    Extremities: [  ] Edema  [  ] No Edema  [  ] Clubbing   [  ] Cyanosis  [  ] Palpable peripheral pulses                          [  ] Tender calf muscles    [  ] No Tender Calf Muscles    Neurological:  [  ] Alert  [  ] Awake  [  ] Oriented  x                              [  ] Focal weakness  [  ] No Focal Weakness    Skin:  [  ] Thrombophlebitis  [  ] Rashes  [  ] Dry  [  ] Ulcers    Ortho:  [  ] Joint Swelling  [  ] Joint erythema [  ] DJD [  ] Increased Temperature to Touch      LABS/DIAGNOSTIC TESTS                          9.2    7.2   )-----------( 110      ( 02 Apr 2018 07:52 )             28.5         04-02    138  |  111<H>  |  91<H>  ----------------------------<  281<H>  4.1   |  16<L>  |  3.83<H>    Ca    7.9<L>      02 Apr 2018 07:52  Phos  3.9     04-02  Mg     2.1     04-02        CAPILLARY BLOOD GLUCOSE      POCT Blood Glucose.: 202 mg/dL (02 Apr 2018 17:00)  POCT Blood Glucose.: 242 mg/dL (02 Apr 2018 11:27)  POCT Blood Glucose.: 277 mg/dL (02 Apr 2018 08:14)  POCT Blood Glucose.: 318 mg/dL (02 Apr 2018 05:21)  POCT Blood Glucose.: 319 mg/dL (01 Apr 2018 21:31)        Hemoglobin A1C, Whole Blood: 5.6 % (04-02 @ 10:20)    Thyroid Stimulating Hormone, Serum: 2.77 uU/mL (03-30 @ 07:26)    CULTURES:       RADIOLOGY  CXR:    acetaminophen   Tablet 650 milliGRAM(s) Oral every 6 hours PRN  acetaminophen   Tablet. 650 milliGRAM(s) Oral every 6 hours PRN  acetaminophen  IVPB. 1000 milliGRAM(s) IV Intermittent once PRN  atorvastatin 40 milliGRAM(s) Oral at bedtime  benzocaine 15 mG/menthol 3.6 mG Lozenge 1 Lozenge Oral every 2 hours PRN  cefTRIAXone Injectable.      cefTRIAXone Injectable. 1000 milliGRAM(s) IV Push every 24 hours  citalopram 20 milliGRAM(s) Oral daily  finasteride 5 milliGRAM(s) Oral daily  heparin  Injectable 5000 Unit(s) SubCutaneous every 12 hours  insulin glargine Injectable (LANTUS) 24 Unit(s) SubCutaneous every morning  insulin lispro (HumaLOG) corrective regimen sliding scale   SubCutaneous three times a day before meals  insulin lispro Injectable (HumaLOG) 3 Unit(s) SubCutaneous three times a day before meals  isosorbide   mononitrate ER Tablet (IMDUR) 30 milliGRAM(s) Oral daily  magnesium hydroxide Suspension 30 milliLiter(s) Oral daily PRN  metoprolol tartrate 25 milliGRAM(s) Oral two times a day  ondansetron Injectable 4 milliGRAM(s) IV Push every 6 hours PRN  ranolazine 500 milliGRAM(s) Oral two times a day  sodium chloride 0.9%. 1000 milliLiter(s) IV Continuous <Continuous>  tamsulosin 0.4 milliGRAM(s) Oral at bedtime

## 2018-04-02 NOTE — PROGRESS NOTE ADULT - ASSESSMENT
-s/p rt hip hemiarthroplasty  -acute on chronic renal insuff-prpe renal per nephro  -post-op ileus  -hx; htn,dm,cad-cabg,hld    PLAN; fluids           renal sono           nephro f/u           abd xray           dvt ppx           phys tx           ortho f/u

## 2018-04-02 NOTE — PROGRESS NOTE ADULT - ATTENDING COMMENTS
saw the pt in am  Had large bm, feeling better  AXR still has distended transverse  Will get Cat scan of the abdomen and pelvis with contrast pt with NGT   will monitor the abdomen

## 2018-04-02 NOTE — PROGRESS NOTE ADULT - PROBLEM SELECTOR PLAN 1
LOBITO due to pre-renal azotemia due low perfusion to kidney from volume depletion and low hb complicated by UTI.  -scr worsening today could contributed by lasix and increase to NS at 100cc/hr. monitor i/o's closely.  -recommend: resend urinalysis, urine lytes (uosm, urine sodium, urine creatinine, chloride, potassium), spot protein to creatinine ratio  -awaiting renal sono to assess kidney size and r/o hydronephrosis.   -Adjust meds to eGFR and avoid IV Gadolinium contrast,NSAIDs, and phosphate enema.  -Monitor I/O's daily.   -Monitor SMA daily.

## 2018-04-02 NOTE — PROGRESS NOTE ADULT - SUBJECTIVE AND OBJECTIVE BOX
CHIEF COMPLAINT:Patient is a 89y old  Male who presents with a chief complaint of s/p fall. Pt appears comfortable,NGT in place.    	  REVIEW OF SYSTEMS:  CONSTITUTIONAL: No fever, weight loss, or fatigue  EYES: No eye pain, visual disturbances, or discharge  ENT:  No difficulty hearing, tinnitus, vertigo; No sinus or throat pain  NECK: No pain or stiffness  RESPIRATORY: No cough, wheezing, chills or hemoptysis; No Shortness of Breath  CARDIOVASCULAR: No chest pain, palpitations, passing out, dizziness, or leg swelling  GASTROINTESTINAL: No abdominal or epigastric pain. No nausea, vomiting, or hematemesis; No diarrhea or constipation. No melena or hematochezia.  GENITOURINARY: No dysuria, frequency, hematuria, or incontinence  NEUROLOGICAL: No headaches, memory loss, loss of strength, numbness, or tremors  SKIN: No itching, burning, rashes, or lesions   LYMPH Nodes: No enlarged glands  ENDOCRINE: No heat or cold intolerance; No hair loss  MUSCULOSKELETAL: No joint pain or swelling; No muscle, back, or extremity pain  PSYCHIATRIC: No depression, anxiety, mood swings, or difficulty sleeping  HEME/LYMPH: No easy bruising, or bleeding gums  ALLERGY AND IMMUNOLOGIC: No hives or eczema	      PHYSICAL EXAM:  T(C): 36.5 (04-02-18 @ 05:14), Max: 36.8 (04-01-18 @ 14:18)  HR: 70 (04-02-18 @ 09:45) (66 - 70)  BP: 133/46 (04-02-18 @ 09:45) (113/49 - 134/44)  RR: 17 (04-02-18 @ 05:14) (16 - 17)  SpO2: 98% (04-02-18 @ 09:45) (98% - 100%)    01 Apr 2018 07:01  -  02 Apr 2018 07:00  --------------------------------------------------------  IN: 0 mL / OUT: 1525 mL / NET: -1525 mL        Appearance: Normal	  HEENT:   Normal oral mucosa, PERRL, EOMI	  Lymphatic: No lymphadenopathy  Cardiovascular: Normal S1 S2, No JVD, No murmurs, No edema  Respiratory: Lungs clear to auscultation	  Psychiatry: A & O x 3, Mood & affect appropriate  Gastrointestinal:  Soft, Non-tender, + BS	  Skin: No rashes, No ecchymoses, No cyanosis	  Neurologic: Non-focal  Extremities: Normal range of motion, No clubbing, cyanosis or edema  Vascular: Peripheral pulses palpable 2+ bilaterally    MEDICATIONS  (STANDING):  atorvastatin 40 milliGRAM(s) Oral at bedtime  cefTRIAXone Injectable.      cefTRIAXone Injectable. 1000 milliGRAM(s) IV Push every 24 hours  citalopram 20 milliGRAM(s) Oral daily  finasteride 5 milliGRAM(s) Oral daily  heparin  Injectable 5000 Unit(s) SubCutaneous every 12 hours  insulin glargine Injectable (LANTUS) 24 Unit(s) SubCutaneous every morning  insulin lispro (HumaLOG) corrective regimen sliding scale   SubCutaneous three times a day before meals  insulin lispro Injectable (HumaLOG) 3 Unit(s) SubCutaneous three times a day before meals  isosorbide   mononitrate ER Tablet (IMDUR) 30 milliGRAM(s) Oral daily  metoprolol tartrate 25 milliGRAM(s) Oral two times a day  ranolazine 500 milliGRAM(s) Oral two times a day  sodium biphosphate Rectal Enema 1 Enema Rectal once  sodium chloride 0.9%. 1000 milliLiter(s) (75 mL/Hr) IV Continuous <Continuous>  tamsulosin 0.4 milliGRAM(s) Oral at bedtime      LABS:	 	                       9.2    7.2   )-----------( 110      ( 02 Apr 2018 07:52 )             28.5     04-02    138  |  111<H>  |  91<H>  ----------------------------<  281<H>  4.1   |  16<L>  |  3.83<H>    Ca    7.9<L>      02 Apr 2018 07:52  Phos  3.9     04-02  Mg     2.1     04-02        Lipid Profile: Cholesterol 120  LDL 39  HDL 55        TSH: Thyroid Stimulating Hormone, Serum: 2.77 uU/mL (03-30 @ 07:26)

## 2018-04-02 NOTE — PROGRESS NOTE ADULT - ASSESSMENT
89 yr old male with PMHX of CAD,HTN,Lipid D/O,Anemia,CRI,DM who presents s/p fall with RT hip pain.  1.Post-op ileus, NGT in place.  2.HTN and CAD-asa,lopressor.  3.Anemia-IV Iron.  4.CRI-renal f/u, IVF.  5.DM-Insulin.  6.Pain control.  7.GI and DVT prophylaxis.

## 2018-04-02 NOTE — PROGRESS NOTE ADULT - SUBJECTIVE AND OBJECTIVE BOX
POD#3   S/P right hip hemiarthroplasty for right femoral neck fx  Has mild pain right hip.  Thigh with some swelling but not tense.  Moving toes and ankle with good sensation.  Dressing changed and has some serosanguinous discharge on dressing.    A/P: Right femoral neck fx s/p hemiarthoplasty         Continue mobilization PT ambulation as able.

## 2018-04-02 NOTE — PROGRESS NOTE ADULT - PROBLEM SELECTOR PLAN 2
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   -order renal sono  -Keep patient euvolemic and renal diet  -Avoid Nephrotoxic Meds/ Agents such as (NSAIDs, IV contrast, Aminoglycosides such as gentamicin, -Gadolinium contrast, Phosphate containing enemas, etc..)  -Adjust Medications according to eGFR.

## 2018-04-02 NOTE — PROGRESS NOTE ADULT - SUBJECTIVE AND OBJECTIVE BOX
CC: pt has some discomfort with the ngt.    Vital Signs Last 24 Hrs  T(C): 36.5 (02 Apr 2018 05:14), Max: 36.8 (01 Apr 2018 14:18)  T(F): 97.7 (02 Apr 2018 05:14), Max: 98.2 (01 Apr 2018 14:18)  HR: 70 (02 Apr 2018 09:45) (66 - 70)  BP: 133/46 (02 Apr 2018 09:45) (113/49 - 134/44)  BP(mean): --  RR: 17 (02 Apr 2018 05:14) (16 - 17)  SpO2: 98% (02 Apr 2018 09:45) (98% - 100%)    04-01 @ 07:01  -  04-02 @ 07:00  --------------------------------------------------------  IN: 0 mL / OUT: 1525 mL / NET: -1525 mL        PHYSICAL EXAM:  GENERAL: No acute respiratory distress.  HEAD:  Atraumatic, Normocephalic  EYES: conjunctiva and sclera clear  ENMT: dry mucous membranes; ngt  NECK: Supple, No JVD  NERVOUS SYSTEM:  Awake, Alert & Oriented X3,   CHEST/LUNG: Clear to percussion bilaterally; No rales, rhonchi, wheezing, or rubs  HEART: Regular rate and rhythm; No murmurs, rubs, or gallops  ABDOMEN: soft, Non-tender, distended; hypoactive Bowel sounds   : folye's cath with cloudy urine.  EXTREMITIES:  No cyanosis, or edema  SKIN: No rashes or lesions    MEDICATIONS:  acetaminophen   Tablet 650 milliGRAM(s) Oral every 6 hours PRN  acetaminophen   Tablet. 650 milliGRAM(s) Oral every 6 hours PRN  acetaminophen  IVPB. 1000 milliGRAM(s) IV Intermittent once PRN  atorvastatin 40 milliGRAM(s) Oral at bedtime  benzocaine 15 mG/menthol 3.6 mG Lozenge 1 Lozenge Oral every 2 hours PRN  cefTRIAXone Injectable.      cefTRIAXone Injectable. 1000 milliGRAM(s) IV Push every 24 hours  citalopram 20 milliGRAM(s) Oral daily  finasteride 5 milliGRAM(s) Oral daily  heparin  Injectable 5000 Unit(s) SubCutaneous every 12 hours  insulin glargine Injectable (LANTUS) 24 Unit(s) SubCutaneous every morning  insulin lispro (HumaLOG) corrective regimen sliding scale   SubCutaneous three times a day before meals  insulin lispro Injectable (HumaLOG) 3 Unit(s) SubCutaneous three times a day before meals  isosorbide   mononitrate ER Tablet (IMDUR) 30 milliGRAM(s) Oral daily  magnesium hydroxide Suspension 30 milliLiter(s) Oral daily PRN  metoprolol tartrate 25 milliGRAM(s) Oral two times a day  ondansetron Injectable 4 milliGRAM(s) IV Push every 6 hours PRN  ranolazine 500 milliGRAM(s) Oral two times a day  sodium biphosphate Rectal Enema 1 Enema Rectal once  sodium chloride 0.9%. 1000 milliLiter(s) IV Continuous <Continuous>  tamsulosin 0.4 milliGRAM(s) Oral at bedtime      LABS:                        9.2    7.2   )-----------( 110      ( 02 Apr 2018 07:52 )             28.5     04-02    138  |  111<H>  |  91<H>  ----------------------------<  281<H>  4.1   |  16<L>  |  3.83<H>    Ca    7.9<L>      02 Apr 2018 07:52  Phos  3.9     04-02  Mg     2.1     04-02          Intact PTH: 120 pg/mL (04-02 @ 10:20)  Magnesium, Serum: 2.1 mg/dL (04-02 @ 07:52)  Phosphorus Level, Serum: 3.9 mg/dL (04-02 @ 07:52)    Urine studies    PTH and Vit D:  Intact PTH: 120 pg/mL (04-02 @ 10:20)

## 2018-04-02 NOTE — PROGRESS NOTE ADULT - SUBJECTIVE AND OBJECTIVE BOX
Patient is a 89y old  Male who presents with a chief complaint of s/p fall (29 Mar 2018 14:30)      INTERVAL HPI/OVERNIGHT EVENTS:  no new events    VITAL SIGNS:  T(F): 97.7 (04-02-18 @ 05:14)  HR: 70 (04-02-18 @ 05:14)  BP: 134/44 (04-02-18 @ 05:14)  RR: 17 (04-02-18 @ 05:14)  SpO2: 99% (04-02-18 @ 05:14)  Wt(kg): --  I&O's Detail    01 Apr 2018 07:01  -  02 Apr 2018 07:00  --------------------------------------------------------  IN:  Total IN: 0 mL    OUT:    Indwelling Catheter - Urethral: 1350 mL    Nasoenteral Tube: 175 mL  Total OUT: 1525 mL    Total NET: -1525 mL              REVIEW OF SYSTEMS:    CONSTITUTIONAL:  No fevers, chills, sweats    HEENT:  Eyes:  No diplopia or blurred vision. ENT:  No earache, sore throat or runny nose.    CARDIOVASCULAR:  No pressure, squeezing, tightness, or heaviness about the chest; no palpitations.    RESPIRATORY:  Per HPI    GASTROINTESTINAL:  No abdominal pain, nausea, vomiting or diarrhea. ngt uncomfortable    GENITOURINARY:  No dysuria, frequency or urgency.    NEUROLOGIC:  No paresthesias, fasciculations, seizures or weakness.    PSYCHIATRIC:  No disorder of thought or mood.      PHYSICAL EXAM:    Constitutional:no complaints  ENMT:atnc, ngt  Neck:supple  Respiratory:clear  Cardiovascular:rr  Gastrointestinal: sl distended  Extremities:no edema rt hip clean  Vascular:intact  Neurological:non focal  Musculoskeletal:no edema, normal rom    MEDICATIONS  (STANDING):  atorvastatin 40 milliGRAM(s) Oral at bedtime  cefTRIAXone Injectable.      cefTRIAXone Injectable. 1000 milliGRAM(s) IV Push every 24 hours  citalopram 20 milliGRAM(s) Oral daily  finasteride 5 milliGRAM(s) Oral daily  heparin  Injectable 5000 Unit(s) SubCutaneous every 12 hours  insulin glargine Injectable (LANTUS) 24 Unit(s) SubCutaneous every morning  insulin lispro (HumaLOG) corrective regimen sliding scale   SubCutaneous three times a day before meals  insulin lispro Injectable (HumaLOG) 3 Unit(s) SubCutaneous three times a day before meals  isosorbide   mononitrate ER Tablet (IMDUR) 30 milliGRAM(s) Oral daily  metoprolol tartrate 25 milliGRAM(s) Oral two times a day  ranolazine 500 milliGRAM(s) Oral two times a day  sodium chloride 0.9%. 1000 milliLiter(s) (75 mL/Hr) IV Continuous <Continuous>  tamsulosin 0.4 milliGRAM(s) Oral at bedtime    MEDICATIONS  (PRN):  acetaminophen   Tablet 650 milliGRAM(s) Oral every 6 hours PRN For Temp over 38.3 C (100.94 F)  acetaminophen   Tablet. 650 milliGRAM(s) Oral every 6 hours PRN Mild Pain (1 - 3)  benzocaine 15 mG/menthol 3.6 mG Lozenge 1 Lozenge Oral every 2 hours PRN Sore Throat  magnesium hydroxide Suspension 30 milliLiter(s) Oral daily PRN Constipation  ondansetron Injectable 4 milliGRAM(s) IV Push every 6 hours PRN Nausea and/or Vomiting  traMADol 25 milliGRAM(s) Oral every 8 hours PRN Moderate Pain (4 - 6)      Allergies    No Known Allergies            LABS:                        9.2    7.2   )-----------( 110      ( 02 Apr 2018 07:52 )             28.5     04-02    138  |  111<H>  |  91<H>  ----------------------------<  281<H>  4.1   |  16<L>  |  3.83<H>    Ca    7.9<L>      02 Apr 2018 07:52  Phos  3.9     04-02  Mg     2.1     04-02                    POCT Blood Glucose.: 277 mg/dL (02 Apr 2018 08:14)  POCT Blood Glucose.: 318 mg/dL (02 Apr 2018 05:21)  POCT Blood Glucose.: 319 mg/dL (01 Apr 2018 21:31)  POCT Blood Glucose.: 291 mg/dL (01 Apr 2018 16:21)  POCT Blood Glucose.: 232 mg/dL (01 Apr 2018 11:51)      EXAM:  XR HIP 1V RT INTRAOP                            PROCEDURE DATE:  03/30/2018          INTERPRETATION:  Right hip x-ray    Indication: Postoperative x-ray for right hip replacement.    Single AP view of bilateral hips are compared to a previous examination   dated 3/29/2018.    Impression: Interval right hip replacement which appears unremarkable.    No evidence for an acute fracture, or dislocation.    Stable left hip screw and left hip pins.    The SI joints appear unremarkable.    Appropriate postoperative soft tissue air and skin staples adjacent to   the right hip.

## 2018-04-02 NOTE — PROGRESS NOTE ADULT - ATTENDING COMMENTS
please call with any questions, Dr. Floyd (cell: [phone])    NEPHROLOGY MEDICAL CARE, [facility]  MD Alejandro Mchugh MD Alexander Bangiev, MD Ljubisa Micic, MD    Answering Service: [phone]    Office: (Conrad Rodriguez MD)  [address] [place] [address]  Ph: [phone]  Fax: [phone]

## 2018-04-02 NOTE — PROGRESS NOTE ADULT - SUBJECTIVE AND OBJECTIVE BOX
Patient with post-op ileus, s/p right hip hemiarthroplasty POD #3  Patient examined at bedside, no complaints.   No nausea, no vomiting, denies flatus or bowel movement  pt is npo, has ngt in place    T(C): 36.5 (04-02-18 @ 05:14), Max: 36.8 (04-01-18 @ 14:18)  HR: 70 (04-02-18 @ 05:14) (66 - 70)  BP: 134/44 (04-02-18 @ 05:14) (113/49 - 134/44)  RR: 17 (04-02-18 @ 05:14) (16 - 17)  SpO2: 99% (04-02-18 @ 05:14) (99% - 100%)  Wt(kg): --      04-01 @ 07:01  -  04-02 @ 07:00  --------------------------------------------------------  IN: 0 mL / OUT: 1525 mL / NET: -1525 mL        Physical Exam  General: AAOx3, No acute distress  Skin: No jaundice, no icterus  Abdomen: soft, nontender, distended  Extremities: non edematous, no calf pain bilaterally                  04-02    138  |  111<H>  |  91<H>  ----------------------------<  281<H>  4.1   |  16<L>  |  3.83<H>    Ca    7.9<L>      02 Apr 2018 07:52  Phos  3.9     04-02  Mg     2.1     04-02

## 2018-04-02 NOTE — PROGRESS NOTE ADULT - ASSESSMENT
89yo male with post op ileus s/p right hip hemiarthroplasty POD #3    1) cont ngt to suction  2) f/u am abd xray  3) cont npo and iv fluids

## 2018-04-02 NOTE — PROGRESS NOTE ADULT - PROBLEM SELECTOR PLAN 7
pt is npo and check phos daily and pth in am. pt is npo and   phos stable and phos daily   pth is 120, acceptable for ckd.

## 2018-04-03 LAB
ANION GAP SERPL CALC-SCNC: 10 MMOL/L — SIGNIFICANT CHANGE UP (ref 5–17)
BUN SERPL-MCNC: 78 MG/DL — HIGH (ref 7–18)
CALCIUM SERPL-MCNC: 7.6 MG/DL — LOW (ref 8.4–10.5)
CHLORIDE SERPL-SCNC: 120 MMOL/L — HIGH (ref 96–108)
CO2 SERPL-SCNC: 17 MMOL/L — LOW (ref 22–31)
CREAT SERPL-MCNC: 3.15 MG/DL — HIGH (ref 0.5–1.3)
GLUCOSE SERPL-MCNC: 181 MG/DL — HIGH (ref 70–99)
POTASSIUM SERPL-MCNC: 4.1 MMOL/L — SIGNIFICANT CHANGE UP (ref 3.5–5.3)
POTASSIUM SERPL-SCNC: 4.1 MMOL/L — SIGNIFICANT CHANGE UP (ref 3.5–5.3)
SODIUM SERPL-SCNC: 147 MMOL/L — HIGH (ref 135–145)
UUN UR-MCNC: 519 MG/DL — SIGNIFICANT CHANGE UP

## 2018-04-03 PROCEDURE — 76775 US EXAM ABDO BACK WALL LIM: CPT | Mod: 26

## 2018-04-03 PROCEDURE — 99233 SBSQ HOSP IP/OBS HIGH 50: CPT

## 2018-04-03 PROCEDURE — 74018 RADEX ABDOMEN 1 VIEW: CPT | Mod: 26

## 2018-04-03 RX ORDER — CEFTRIAXONE 500 MG/1
1 INJECTION, POWDER, FOR SOLUTION INTRAMUSCULAR; INTRAVENOUS EVERY 24 HOURS
Qty: 0 | Refills: 0 | Status: DISCONTINUED | OUTPATIENT
Start: 2018-04-04 | End: 2018-04-06

## 2018-04-03 RX ORDER — CEFTRIAXONE 500 MG/1
INJECTION, POWDER, FOR SOLUTION INTRAMUSCULAR; INTRAVENOUS
Qty: 0 | Refills: 0 | Status: DISCONTINUED | OUTPATIENT
Start: 2018-04-03 | End: 2018-04-03

## 2018-04-03 RX ORDER — CEFTRIAXONE 500 MG/1
1 INJECTION, POWDER, FOR SOLUTION INTRAMUSCULAR; INTRAVENOUS ONCE
Qty: 0 | Refills: 0 | Status: COMPLETED | OUTPATIENT
Start: 2018-04-03 | End: 2018-04-03

## 2018-04-03 RX ADMIN — HEPARIN SODIUM 5000 UNIT(S): 5000 INJECTION INTRAVENOUS; SUBCUTANEOUS at 18:43

## 2018-04-03 RX ADMIN — CEFTRIAXONE 100 GRAM(S): 500 INJECTION, POWDER, FOR SOLUTION INTRAMUSCULAR; INTRAVENOUS at 13:21

## 2018-04-03 RX ADMIN — RANOLAZINE 500 MILLIGRAM(S): 500 TABLET, FILM COATED, EXTENDED RELEASE ORAL at 18:43

## 2018-04-03 RX ADMIN — CITALOPRAM 20 MILLIGRAM(S): 10 TABLET, FILM COATED ORAL at 13:18

## 2018-04-03 RX ADMIN — HEPARIN SODIUM 5000 UNIT(S): 5000 INJECTION INTRAVENOUS; SUBCUTANEOUS at 05:52

## 2018-04-03 RX ADMIN — ISOSORBIDE MONONITRATE 30 MILLIGRAM(S): 60 TABLET, EXTENDED RELEASE ORAL at 13:19

## 2018-04-03 RX ADMIN — ATORVASTATIN CALCIUM 40 MILLIGRAM(S): 80 TABLET, FILM COATED ORAL at 22:03

## 2018-04-03 RX ADMIN — BENZOCAINE AND MENTHOL 1 LOZENGE: 5; 1 LIQUID ORAL at 22:23

## 2018-04-03 RX ADMIN — Medication 1: at 05:52

## 2018-04-03 RX ADMIN — Medication 1: at 15:07

## 2018-04-03 RX ADMIN — Medication 2: at 22:02

## 2018-04-03 RX ADMIN — INSULIN GLARGINE 15 UNIT(S): 100 INJECTION, SOLUTION SUBCUTANEOUS at 07:59

## 2018-04-03 RX ADMIN — Medication 1 ENEMA: at 13:20

## 2018-04-03 RX ADMIN — FINASTERIDE 5 MILLIGRAM(S): 5 TABLET, FILM COATED ORAL at 13:19

## 2018-04-03 RX ADMIN — TAMSULOSIN HYDROCHLORIDE 0.4 MILLIGRAM(S): 0.4 CAPSULE ORAL at 22:07

## 2018-04-03 RX ADMIN — Medication 25 MILLIGRAM(S): at 18:46

## 2018-04-03 RX ADMIN — CEFTRIAXONE 1000 MILLIGRAM(S): 500 INJECTION, POWDER, FOR SOLUTION INTRAMUSCULAR; INTRAVENOUS at 00:21

## 2018-04-03 RX ADMIN — Medication 2: at 18:43

## 2018-04-03 NOTE — PROGRESS NOTE ADULT - SUBJECTIVE AND OBJECTIVE BOX
pt with large bowel distension  Feeling better  saw the pt in am  Had large bm, feeling better  AXR still has distended transverse  Will get Cat scan of the abdomen and pelvis with contrast  ICU Vital Signs Last 24 Hrs  T(C): 36.8 (03 Apr 2018 13:43), Max: 36.8 (03 Apr 2018 13:43)  T(F): 98.2 (03 Apr 2018 13:43), Max: 98.2 (03 Apr 2018 13:43)  HR: 87 (03 Apr 2018 13:43) (76 - 87)  BP: 157/61 (03 Apr 2018 13:43) (143/57 - 157/61)  BP(mean): --  ABP: --  ABP(mean): --  RR: 18 (03 Apr 2018 13:43) (17 - 18)  SpO2: 99% (03 Apr 2018 13:43) (97% - 99%)                          9.2    7.2   )-----------( 110      ( 02 Apr 2018 07:52 )             28.5   04-03    147<H>  |  120<H>  |  78<H>  ----------------------------<  181<H>  4.1   |  17<L>  |  3.15<H>    Ca    7.6<L>      03 Apr 2018 18:36  Phos  3.9     04-02  Mg     2.1     04-02

## 2018-04-03 NOTE — PROGRESS NOTE ADULT - SUBJECTIVE AND OBJECTIVE BOX
HPI:  Patient is a 89M from home, AAOx3, lives with wife, ambulates independently, wife at bedside for [place] translation, with PMH HTN, DM, CAD, CABG 7 years ago, L Hip Fracture 8 years ago (s/p repair), and HLD presenting to the ED s/p mechanical fall over couch yesterday night 11pm, falling onto his R hip. Denies fever, chills, SOB, palpitations, chest pain, nausea, vomiting, diarrhea or constipation, only ROS + R hip pain s/p fracture. Denies syncope, LOC or head injury at time of fall.    PMH: as per HPI  Surgical Hx: L hip surgery  Fam Hx: mother and father- DM  Social Hx: neg for smoking or etoh  Allergies: NKDA (29 Mar 2018 14:30)      Meds:    Allergies:  Allergies    No Known Allergies    Intolerances        REVIEW OF SYSTEMS:    CONSTITUTIONAL: No weakness, fevers or chills  EYES/ENT: No visual changes;  No vertigo or throat pain   NECK: No pain or stiffness  RESPIRATORY: No cough, wheezing, hemoptysis; No shortness of breath  CARDIOVASCULAR: No chest pain or palpitations  GASTROINTESTINAL: No abdominal or epigastric pain. No nausea, vomiting, or hematemesis; No diarrhea or constipation. No melena or hematochezia.  GENITOURINARY: No dysuria, frequency or hematuria  NEUROLOGICAL: No numbness or weakness  SKIN: No itching, burning, rashes, or lesions   All other review of systems is negative unless indicated above.    PHYSICAL EXAM:    Vital Signs Last 24 Hrs  T(C): 36.8 (03 Apr 2018 13:43), Max: 36.8 (03 Apr 2018 13:43)  T(F): 98.2 (03 Apr 2018 13:43), Max: 98.2 (03 Apr 2018 13:43)  HR: 87 (03 Apr 2018 13:43) (76 - 87)  BP: 157/61 (03 Apr 2018 13:43) (143/57 - 157/61)  BP(mean): --  RR: 18 (03 Apr 2018 13:43) (17 - 18)  SpO2: 99% (03 Apr 2018 13:43) (97% - 99%)    HEENT:    Neck:  [  ] Supple  [  ] Lymphadenopathy  [  ] JVD  [  ] Masses  [  ] WNL    CHEST/Respiratory: [ ] Clear to auscultation     [  ] Rales      [  ] Rhonchi      [  ] Wheezing       [  ] Chest Tenderness     [  ] WNL    Cardiovascular:  [  ]S1 S2  [  ] Reg  [  ] Irreg   [  ] No Murmurs   [  ] Murmurs  [  ] Systolic [  ] Diastolic    Gastrointestinal:  [  ] Bowel Sounds  [   ] ABD Soft  [  ] ABD Distention   [  ] No Tenderness [  ] Tenderness  [  ] Organomegaly  [  ] Guarding rigidity  [  ] No Guarding rigidity  [  ] Rebound Tenderness [  ] No Rebound Tenderness    Extremities: [  ] Edema  [  ] No Edema  [  ] Clubbing   [  ] Cyanosis  [  ] Palpable peripheral pulses                          [  ] Tender calf muscles    [  ] No Tender Calf Muscles    Neurological:  [  ] Alert  [  ] Awake  [  ] Oriented  x                              [  ] Focal weakness  [  ] No Focal Weakness    Skin:  [  ] Thrombophlebitis  [  ] Rashes  [  ] Dry  [  ] Ulcers    Ortho:  [  ] Joint Swelling  [  ] Joint erythema [  ] DJD [  ] Increased Temperature to Touch      LABS/DIAGNOSTIC TESTS                          9.2    7.2   )-----------( 110      ( 02 Apr 2018 07:52 )             28.5         04-02    138  |  111<H>  |  91<H>  ----------------------------<  281<H>  4.1   |  16<L>  |  3.83<H>    Ca    7.9<L>      02 Apr 2018 07:52  Phos  3.9     04-02  Mg     2.1     04-02        CAPILLARY BLOOD GLUCOSE      POCT Blood Glucose.: 203 mg/dL (03 Apr 2018 18:23)  POCT Blood Glucose.: 217 mg/dL (03 Apr 2018 16:36)  POCT Blood Glucose.: 185 mg/dL (03 Apr 2018 15:04)  POCT Blood Glucose.: 161 mg/dL (03 Apr 2018 05:37)  POCT Blood Glucose.: 233 mg/dL (02 Apr 2018 22:41)        Hemoglobin A1C, Whole Blood: 5.6 % (04-02 @ 10:20)    Thyroid Stimulating Hormone, Serum: 2.77 uU/mL (03-30 @ 07:26)    CULTURES:       RADIOLOGY  CXR:    acetaminophen   Tablet 650 milliGRAM(s) Oral every 6 hours PRN  acetaminophen   Tablet. 650 milliGRAM(s) Oral every 6 hours PRN  acetaminophen  IVPB. 1000 milliGRAM(s) IV Intermittent once PRN  atorvastatin 40 milliGRAM(s) Oral at bedtime  benzocaine 15 mG/menthol 3.6 mG Lozenge 1 Lozenge Oral every 2 hours PRN  citalopram 20 milliGRAM(s) Oral daily  finasteride 5 milliGRAM(s) Oral daily  heparin  Injectable 5000 Unit(s) SubCutaneous every 12 hours  insulin glargine Injectable (LANTUS) 15 Unit(s) SubCutaneous every morning  insulin lispro (HumaLOG) corrective regimen sliding scale   SubCutaneous every 4 hours  isosorbide   mononitrate ER Tablet (IMDUR) 30 milliGRAM(s) Oral daily  magnesium hydroxide Suspension 30 milliLiter(s) Oral daily PRN  metoprolol tartrate 25 milliGRAM(s) Oral two times a day  ondansetron Injectable 4 milliGRAM(s) IV Push every 6 hours PRN  ranolazine 500 milliGRAM(s) Oral two times a day  sodium chloride 0.9%. 1000 milliLiter(s) IV Continuous <Continuous>  tamsulosin 0.4 milliGRAM(s) Oral at bedtime

## 2018-04-03 NOTE — PROGRESS NOTE ADULT - ASSESSMENT
1.s/p hip surgery  post op care as per ortho  dvt prophylaxis  2.arf/ckd  labs pending  as per renal  3.ileus  improving  liq diet as per surg  4.diabetes  sugars imroving  follow trend/adjust

## 2018-04-03 NOTE — PROGRESS NOTE ADULT - PROBLEM SELECTOR PLAN 2
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   -Keep patient euvolemic and renal diet  -Avoid Nephrotoxic Meds/ Agents such as (NSAIDs, IV contrast, Aminoglycosides such as gentamicin, -Gadolinium contrast, Phosphate containing enemas, etc..)  -Adjust Medications according to eGFR.

## 2018-04-03 NOTE — PROGRESS NOTE ADULT - PROBLEM SELECTOR PLAN 1
LOBITO due to pre-renal azotemia due low perfusion to kidney from volume depletion and low hb complicated by UTI vs ATN.  -No new labs and send bmp today; continue NS at 100cc/hr. monitor i/o's closely.  -urinalysis, urine lytes noted. spot protein to creatinine ratio is 0.92mg.  -renal sono shows no hydronephrosis.   -Adjust meds to eGFR and avoid IV Gadolinium contrast,NSAIDs, and phosphate enema.  -Monitor I/O's daily.   -Monitor SMA daily.

## 2018-04-03 NOTE — PROGRESS NOTE ADULT - ASSESSMENT
89 yr old male with PMHX of CAD,HTN,Lipid D/O,Anemia,CRI,DM who presents s/p fall with RT hip pain.  1.Post-op ileus, NGT in place,f/u xray.  2.HTN and CAD-asa,lopressor.  3.Anemia- Iron.  4.CRI-renal f/u, IVF.  5.DM-Insulin.  6.Pain control.  7.GI and DVT prophylaxis.

## 2018-04-03 NOTE — PROGRESS NOTE ADULT - SUBJECTIVE AND OBJECTIVE BOX
ORTHO ATTENDING  POD#4  Reports little pain right hip.  Ambulating in PT  NGT out.    PE: Right hip dressing intact.       Less swelling thigh.       No calf tenderness.    A/P: Right femoral neck fracture s/p hemiarthroplasty        Continue mobilization PT ambulation WBAT        Discussed with pt and family at bedside.

## 2018-04-03 NOTE — PROGRESS NOTE ADULT - SUBJECTIVE AND OBJECTIVE BOX
CC: pt had ngt removed and taking some clear liquids.    Vital Signs Last 24 Hrs  T(C): 36.8 (2018 13:43), Max: 36.8 (2018 13:43)  T(F): 98.2 (2018 13:43), Max: 98.2 (2018 13:43)  HR: 87 (2018 13:43) (76 - 87)  BP: 157/61 (2018 13:43) (143/57 - 157/61)  BP(mean): --  RR: 18 (2018 13:43) (17 - 18)  SpO2: 99% (2018 13:43) (97% - 99%)     @ 07:03 @ 07:00  --------------------------------------------------------  IN: 500 mL / OUT: 1175 mL / NET: -675 mL     @ 07: @ 17:52  --------------------------------------------------------  IN: 0 mL / OUT: 600 mL / NET: -600 mL        PHYSICAL EXAM:  GENERAL: No acute respiratory distress.  HEAD:  Atraumatic, Normocephalic  EYES: conjunctiva and sclera clear  ENMT: moist mucous membranes;   NECK: Supple, No JVD  NERVOUS SYSTEM:  Awake, Alert & Oriented X3,   CHEST/LUNG: Clear to percussion bilaterally; No rales, rhonchi, wheezing, or rubs  HEART: Regular rate and rhythm; No murmurs, rubs, or gallops  ABDOMEN: soft, Non-tender, distended; hypoactive Bowel sounds   : folye's cath with clear urine.  EXTREMITIES:  No cyanosis, or edema  SKIN: No rashes or lesions      MEDICATIONS:  acetaminophen   Tablet 650 milliGRAM(s) Oral every 6 hours PRN  acetaminophen   Tablet. 650 milliGRAM(s) Oral every 6 hours PRN  acetaminophen  IVPB. 1000 milliGRAM(s) IV Intermittent once PRN  atorvastatin 40 milliGRAM(s) Oral at bedtime  benzocaine 15 mG/menthol 3.6 mG Lozenge 1 Lozenge Oral every 2 hours PRN  citalopram 20 milliGRAM(s) Oral daily  finasteride 5 milliGRAM(s) Oral daily  heparin  Injectable 5000 Unit(s) SubCutaneous every 12 hours  insulin glargine Injectable (LANTUS) 15 Unit(s) SubCutaneous every morning  insulin lispro (HumaLOG) corrective regimen sliding scale   SubCutaneous every 4 hours  isosorbide   mononitrate ER Tablet (IMDUR) 30 milliGRAM(s) Oral daily  magnesium hydroxide Suspension 30 milliLiter(s) Oral daily PRN  metoprolol tartrate 25 milliGRAM(s) Oral two times a day  ondansetron Injectable 4 milliGRAM(s) IV Push every 6 hours PRN  ranolazine 500 milliGRAM(s) Oral two times a day  sodium chloride 0.9%. 1000 milliLiter(s) IV Continuous <Continuous>  tamsulosin 0.4 milliGRAM(s) Oral at bedtime      LABS:                        9.2    7.2   )-----------( 110      ( 2018 07:52 )             28.5     04-02    138  |  111<H>  |  91<H>  ----------------------------<  281<H>  4.1   |  16<L>  |  3.83<H>    Ca    7.9<L>      2018 07:52  Phos  3.9       Mg     2.1             Urinalysis Basic - ( 2018 15:33 )    Color: Yellow / Appearance: Clear / S.015 / pH: x  Gluc: x / Ketone: Negative  / Bili: Negative / Urobili: Negative   Blood: x / Protein: 30 mg/dL / Nitrite: Negative   Leuk Esterase: Moderate / RBC: 2-5 /HPF / WBC >50 /HPF   Sq Epi: x / Non Sq Epi: Occasional /HPF / Bacteria: Few /HPF        Urine studies  Urea Nitrogen,  Random Urine: 519 mg/dL ( @ 01:40)  Creatinine, Random Urine: 37 mg/dL ( @ 15:34)  Sodium, Random Urine: 75 mmol/L ( @ 15:34)  Potassium, Random Urine: 18 mmol/L <L> ( @ 15:34)  Osmolality, Random Urine: 400 mos/kg ( @ 15:33)    PTH and Vit D:

## 2018-04-03 NOTE — PROGRESS NOTE ADULT - ATTENDING COMMENTS
please call with any questions, Dr. Floyd (cell: [phone])    NEPHROLOGY MEDICAL CARE, [facility]  MD Alejandro Mchugh MD Alexander Bangiev, MD Ljubisa Micic, MD    Answering Service: [phone]    Office: (Conrad Rodriguez MD)  [address] [place] [address]  Ph: [phone]  Fax: [phone]

## 2018-04-03 NOTE — PROGRESS NOTE ADULT - SUBJECTIVE AND OBJECTIVE BOX
CHIEF COMPLAINT:Patient is a 89y old  Male who presents with a chief complaint of s/p fall .Pt appears comfortable.    	  REVIEW OF SYSTEMS:  CONSTITUTIONAL: No fever, weight loss, or fatigue  EYES: No eye pain, visual disturbances, or discharge  ENT:  No difficulty hearing, tinnitus, vertigo; No sinus or throat pain  NECK: No pain or stiffness  RESPIRATORY: No cough, wheezing, chills or hemoptysis; No Shortness of Breath  CARDIOVASCULAR: No chest pain, palpitations, passing out, dizziness, or leg swelling  GASTROINTESTINAL: No abdominal or epigastric pain. No nausea, vomiting, or hematemesis; No diarrhea or constipation. No melena or hematochezia.  GENITOURINARY: No dysuria, frequency, hematuria, or incontinence  NEUROLOGICAL: No headaches, memory loss, loss of strength, numbness, or tremors  SKIN: No itching, burning, rashes, or lesions   LYMPH Nodes: No enlarged glands  ENDOCRINE: No heat or cold intolerance; No hair loss  MUSCULOSKELETAL: No joint pain or swelling; No muscle, back, or extremity pain  PSYCHIATRIC: No depression, anxiety, mood swings, or difficulty sleeping  HEME/LYMPH: No easy bruising, or bleeding gums  ALLERGY AND IMMUNOLOGIC: No hives or eczema	    PHYSICAL EXAM:  T(C): 36.4 (04-03-18 @ 05:07), Max: 36.7 (04-02-18 @ 14:00)  HR: 81 (04-03-18 @ 09:27) (71 - 81)  BP: 157/58 (04-03-18 @ 09:27) (137/59 - 157/58)  RR: 18 (04-03-18 @ 05:07) (17 - 179)  SpO2: 98% (04-03-18 @ 09:27) (97% - 100%)    I&O's Summary    02 Apr 2018 07:01  -  03 Apr 2018 07:00  --------------------------------------------------------  IN: 500 mL / OUT: 1175 mL / NET: -675 mL        Appearance: Normal	  HEENT:   Normal oral mucosa, PERRL, EOMI	  Lymphatic: No lymphadenopathy  Cardiovascular: Normal S1 S2, No JVD, No murmurs, No edema  Respiratory: Lungs clear to auscultation	  Psychiatry: A & O x 3, Mood & affect appropriate  Gastrointestinal:  Soft, Non-tender  Skin: No rashes, No ecchymoses, No cyanosis	  Neurologic: Non-focal  Extremities: Normal range of motion, No clubbing, cyanosis or edema  Vascular: Peripheral pulses palpable 2+ bilaterally    MEDICATIONS  (STANDING):  atorvastatin 40 milliGRAM(s) Oral at bedtime  cefTRIAXone Injectable.      cefTRIAXone Injectable. 1000 milliGRAM(s) IV Push every 24 hours  citalopram 20 milliGRAM(s) Oral daily  finasteride 5 milliGRAM(s) Oral daily  heparin  Injectable 5000 Unit(s) SubCutaneous every 12 hours  insulin glargine Injectable (LANTUS) 15 Unit(s) SubCutaneous every morning  insulin lispro (HumaLOG) corrective regimen sliding scale   SubCutaneous every 4 hours  isosorbide   mononitrate ER Tablet (IMDUR) 30 milliGRAM(s) Oral daily  metoprolol tartrate 25 milliGRAM(s) Oral two times a day  ranolazine 500 milliGRAM(s) Oral two times a day  sodium biphosphate Rectal Enema 1 Enema Rectal once  sodium chloride 0.9%. 1000 milliLiter(s) (100 mL/Hr) IV Continuous <Continuous>  tamsulosin 0.4 milliGRAM(s) Oral at bedtime      	  LABS:	 	                        9.2    7.2   )-----------( 110      ( 02 Apr 2018 07:52 )             28.5     04-02    138  |  111<H>  |  91<H>  ----------------------------<  281<H>  4.1   |  16<L>  |  3.83<H>    Ca    7.9<L>      02 Apr 2018 07:52  Phos  3.9     04-02  Mg     2.1     04-02        Lipid Profile: Cholesterol 120  LDL 39  HDL 55      HgA1c: Hemoglobin A1C, Whole Blood: 5.6 % (04-02 @ 10:20)    TSH: Thyroid Stimulating Hormone, Serum: 2.77 uU/mL (03-30 @ 07:26)

## 2018-04-04 ENCOUNTER — TRANSCRIPTION ENCOUNTER (OUTPATIENT)
Age: 83
End: 2018-04-04

## 2018-04-04 DIAGNOSIS — E87.0 HYPEROSMOLALITY AND HYPERNATREMIA: ICD-10-CM

## 2018-04-04 LAB
ALBUMIN SERPL ELPH-MCNC: 2.1 G/DL — LOW (ref 3.5–5)
ALP SERPL-CCNC: 80 U/L — SIGNIFICANT CHANGE UP (ref 40–120)
ALT FLD-CCNC: <6 U/L DA — LOW (ref 10–60)
ANION GAP SERPL CALC-SCNC: 13 MMOL/L — SIGNIFICANT CHANGE UP (ref 5–17)
AST SERPL-CCNC: 17 U/L — SIGNIFICANT CHANGE UP (ref 10–40)
BILIRUB SERPL-MCNC: 0.5 MG/DL — SIGNIFICANT CHANGE UP (ref 0.2–1.2)
BUN SERPL-MCNC: 75 MG/DL — HIGH (ref 7–18)
CALCIUM SERPL-MCNC: 8.4 MG/DL — SIGNIFICANT CHANGE UP (ref 8.4–10.5)
CHLORIDE SERPL-SCNC: 120 MMOL/L — HIGH (ref 96–108)
CO2 SERPL-SCNC: 15 MMOL/L — LOW (ref 22–31)
CREAT SERPL-MCNC: 2.84 MG/DL — HIGH (ref 0.5–1.3)
GLUCOSE SERPL-MCNC: 185 MG/DL — HIGH (ref 70–99)
HCT VFR BLD CALC: 27.9 % — LOW (ref 39–50)
HGB BLD-MCNC: 8.9 G/DL — LOW (ref 13–17)
MCHC RBC-ENTMCNC: 29.6 PG — SIGNIFICANT CHANGE UP (ref 27–34)
MCHC RBC-ENTMCNC: 31.9 GM/DL — LOW (ref 32–36)
MCV RBC AUTO: 93 FL — SIGNIFICANT CHANGE UP (ref 80–100)
PLATELET # BLD AUTO: 112 K/UL — LOW (ref 150–400)
POTASSIUM SERPL-MCNC: 3.8 MMOL/L — SIGNIFICANT CHANGE UP (ref 3.5–5.3)
POTASSIUM SERPL-SCNC: 3.8 MMOL/L — SIGNIFICANT CHANGE UP (ref 3.5–5.3)
PROT SERPL-MCNC: 5.5 G/DL — LOW (ref 6–8.3)
RBC # BLD: 3 M/UL — LOW (ref 4.2–5.8)
RBC # FLD: 14.3 % — SIGNIFICANT CHANGE UP (ref 10.3–14.5)
SODIUM SERPL-SCNC: 148 MMOL/L — HIGH (ref 135–145)
WBC # BLD: 6.6 K/UL — SIGNIFICANT CHANGE UP (ref 3.8–10.5)
WBC # FLD AUTO: 6.6 K/UL — SIGNIFICANT CHANGE UP (ref 3.8–10.5)

## 2018-04-04 PROCEDURE — 99232 SBSQ HOSP IP/OBS MODERATE 35: CPT

## 2018-04-04 RX ORDER — SODIUM CHLORIDE 9 MG/ML
1000 INJECTION, SOLUTION INTRAVENOUS
Qty: 0 | Refills: 0 | Status: DISCONTINUED | OUTPATIENT
Start: 2018-04-04 | End: 2018-04-04

## 2018-04-04 RX ORDER — SODIUM CHLORIDE 9 MG/ML
1000 INJECTION, SOLUTION INTRAVENOUS
Qty: 0 | Refills: 0 | Status: DISCONTINUED | OUTPATIENT
Start: 2018-04-04 | End: 2018-04-06

## 2018-04-04 RX ORDER — ALLOPURINOL 300 MG
1 TABLET ORAL
Qty: 0 | Refills: 0 | COMMUNITY

## 2018-04-04 RX ORDER — TRAMADOL HYDROCHLORIDE 50 MG/1
1 TABLET ORAL
Qty: 20 | Refills: 0
Start: 2018-04-04 | End: 2018-04-08

## 2018-04-04 RX ORDER — POLYETHYLENE GLYCOL 3350 17 G/17G
17 POWDER, FOR SOLUTION ORAL
Qty: 119 | Refills: 0 | OUTPATIENT
Start: 2018-04-04 | End: 2018-04-10

## 2018-04-04 RX ORDER — DOCUSATE SODIUM 100 MG
1 CAPSULE ORAL
Qty: 14 | Refills: 0
Start: 2018-04-04 | End: 2018-04-10

## 2018-04-04 RX ORDER — ASPIRIN/CALCIUM CARB/MAGNESIUM 324 MG
1 TABLET ORAL
Qty: 14 | Refills: 0
Start: 2018-04-04 | End: 2018-04-17

## 2018-04-04 RX ORDER — SENNA PLUS 8.6 MG/1
2 TABLET ORAL
Qty: 14 | Refills: 0 | OUTPATIENT
Start: 2018-04-04 | End: 2018-04-10

## 2018-04-04 RX ADMIN — HEPARIN SODIUM 5000 UNIT(S): 5000 INJECTION INTRAVENOUS; SUBCUTANEOUS at 16:46

## 2018-04-04 RX ADMIN — INSULIN GLARGINE 15 UNIT(S): 100 INJECTION, SOLUTION SUBCUTANEOUS at 08:33

## 2018-04-04 RX ADMIN — RANOLAZINE 500 MILLIGRAM(S): 500 TABLET, FILM COATED, EXTENDED RELEASE ORAL at 05:53

## 2018-04-04 RX ADMIN — Medication 4: at 14:22

## 2018-04-04 RX ADMIN — CITALOPRAM 20 MILLIGRAM(S): 10 TABLET, FILM COATED ORAL at 12:48

## 2018-04-04 RX ADMIN — HEPARIN SODIUM 5000 UNIT(S): 5000 INJECTION INTRAVENOUS; SUBCUTANEOUS at 05:53

## 2018-04-04 RX ADMIN — SODIUM CHLORIDE 100 MILLILITER(S): 9 INJECTION INTRAMUSCULAR; INTRAVENOUS; SUBCUTANEOUS at 05:53

## 2018-04-04 RX ADMIN — CEFTRIAXONE 100 GRAM(S): 500 INJECTION, POWDER, FOR SOLUTION INTRAMUSCULAR; INTRAVENOUS at 07:53

## 2018-04-04 RX ADMIN — TAMSULOSIN HYDROCHLORIDE 0.4 MILLIGRAM(S): 0.4 CAPSULE ORAL at 21:03

## 2018-04-04 RX ADMIN — ISOSORBIDE MONONITRATE 30 MILLIGRAM(S): 60 TABLET, EXTENDED RELEASE ORAL at 12:48

## 2018-04-04 RX ADMIN — FINASTERIDE 5 MILLIGRAM(S): 5 TABLET, FILM COATED ORAL at 12:47

## 2018-04-04 RX ADMIN — RANOLAZINE 500 MILLIGRAM(S): 500 TABLET, FILM COATED, EXTENDED RELEASE ORAL at 16:46

## 2018-04-04 RX ADMIN — Medication 25 MILLIGRAM(S): at 16:46

## 2018-04-04 RX ADMIN — Medication 25 MILLIGRAM(S): at 05:53

## 2018-04-04 RX ADMIN — Medication 1: at 05:53

## 2018-04-04 RX ADMIN — ATORVASTATIN CALCIUM 40 MILLIGRAM(S): 80 TABLET, FILM COATED ORAL at 21:03

## 2018-04-04 RX ADMIN — Medication 1: at 02:22

## 2018-04-04 RX ADMIN — Medication 3: at 10:50

## 2018-04-04 NOTE — DISCHARGE NOTE ADULT - MEDICATION SUMMARY - MEDICATIONS TO STOP TAKING
I will STOP taking the medications listed below when I get home from the hospital:    acetaminophen-oxyCODONE 325 mg-5 mg oral tablet  -- 1 tab(s) by mouth every 4 hours, As needed, Mild Pain    acetaminophen-oxyCODONE 325 mg-5 mg oral tablet  -- 2 tab(s) by mouth every 6 hours, As needed, Moderate Pain    enoxaparin  -- 30 milligram(s) subcutaneous once a day    cephalexin 500 mg oral tablet  -- 1 tab(s) by mouth every 12 hours  -- Finish all this medication unless otherwise directed by prescriber.

## 2018-04-04 NOTE — DISCHARGE NOTE ADULT - SECONDARY DIAGNOSIS.
CAD (coronary artery disease) Acute kidney injury Essential hypertension HLD (hyperlipidemia) Type 2 diabetes mellitus without complication, unspecified long term insulin use status

## 2018-04-04 NOTE — DISCHARGE NOTE ADULT - CONDITIONS AT DISCHARGE
Pt AOx3 breathing unlabored no c/o resp. distress chest pain or SOB. Pt S/P Right Hip Hemiarthroplasty right hip noted with Mepilex dressing CDI Cap refill less than 3 seconds pulses present in all extremities Pt with positive sensation and mobility OOB ambulating with PT. Pt voiding without any difficulties. FS within normal ranges. Abdomen soft non tender non distended BS present in all 4 quadrants Pt tolerating diet denies any N/V. Pt post post-op ileus noted to be resolving. Pt for DC home post tolerating regular diet, pt verbalizes understanding and agreement with DC Vital signs stable no distress noted All needs of pt met thus far.

## 2018-04-04 NOTE — PROGRESS NOTE ADULT - ATTENDING COMMENTS
Discussed about cat scan. But he does not want any test and wants to eat as he is having bms and passing flatus

## 2018-04-04 NOTE — DISCHARGE NOTE ADULT - PATIENT PORTAL LINK FT
You can access the [facility] Patient Portal, offered by [facility], by registering with the following website: [email or web address]

## 2018-04-04 NOTE — PROGRESS NOTE ADULT - PROBLEM SELECTOR PLAN 8
Hypernatremia due to water deficit and insensible losses. Pt is clinically euvolemic.   -change fluids to 1/2 NS at 60cc/hr.    -Monitor I/O's. Check Serum Na Daily. Avoid high solute intake diet and sodium bicarbonate infuse. Avoid overcorrection of NA (8-10meq/day)

## 2018-04-04 NOTE — DISCHARGE NOTE ADULT - PLAN OF CARE
Increase range of motion Please follow up with Dr. Sosa within 2 week   continue aspirin for 2 weeks  staples to be removed on 4/14 continue current regimen and follow up with PCP

## 2018-04-04 NOTE — DISCHARGE NOTE ADULT - MEDICATION SUMMARY - MEDICATIONS TO TAKE
I will START or STAY ON the medications listed below when I get home from the hospital:    probiotic supplements  -- 1 day(s)  every 12 hours with antibiotics  -- Indication: For Home med    finasteride 5 mg oral tablet  -- 1 tab(s) by mouth once a day  -- Indication: For bph     Ultram 50 mg oral tablet  -- 1 tab(s) by mouth every 6 hours, As Needed -for moderate pain MDD:4  -- Caution federal law prohibits the transfer of this drug to any person other  than the person for whom it was prescribed.  May cause drowsiness.  Alcohol may intensify this effect.  Use care when operating dangerous machinery.  Obtain medical advice before taking any non-prescription drugs as some may affect the action of this medication.    -- Indication: For prn pain     Ascriptin 325 mg oral tablet  -- 1 tab(s) by mouth once a day   -- Take with food or milk.    -- Indication: For Dvt prophylaxis     tamsulosin 0.4 mg oral capsule  -- 1 cap(s) by mouth once a day (at bedtime)  -- Indication: For bph     ranolazine 500 mg oral tablet, extended release  -- 1 tab(s) by mouth 2 times a day  -- Indication: For CAD (coronary artery disease)    isosorbide mononitrate 30 mg oral tablet, extended release  -- 1 tab(s) by mouth once a day  -- Indication: For CAD (coronary artery disease)    citalopram 20 mg oral tablet  -- 1 tab(s) by mouth once a day  -- Indication: For Depression     glimepiride 4 mg oral tablet  -- 1 tab(s) by mouth once a day  -- Indication: For DMII    Lantus 100 units/mL subcutaneous solution  -- 40  subcutaneous once a day in the morning  -- Indication: For DMII    Crestor 10 mg oral tablet  -- 1 tab(s) by mouth once a day (at bedtime)  -- Indication: For HLD (hyperlipidemia)    Plavix 75 mg oral tablet  -- 1 tab(s) by mouth once a day  -- Indication: For CAD (coronary artery disease)    zolpidem 10 mg oral tablet  -- 1 tab(s) by mouth once a day (at bedtime)  -- Indication: For Anxiety     atenolol 25 mg oral tablet  -- 1 tab(s) by mouth once a day  -- Indication: For Htn     Norvasc 10 mg oral tablet  -- 1 tab(s) by mouth once a day  -- Indication: For Htn     ferrous sulfate 325 mg (65 mg elemental iron) oral tablet  -- 1 tab(s) by mouth once a day  -- Indication: For Anemia, chronic disease    docusate sodium 100 mg oral tablet  -- 1 tab(s) by mouth 2 times a day  -- Indication: For Constipation     Senna 8.6 mg oral tablet  -- 1 tab(s) by mouth once a day (at bedtime)  -- Indication: For Constipation     MiraLax oral powder for reconstitution  -- 17 gram(s) by mouth once a day   -- Dilute this medication with liquid before administration.  It is very important that you take or use this exactly as directed.  Do not skip doses or discontinue unless directed by your doctor.    -- Indication: For Constipation     senna oral tablet  -- 2 tab(s) by mouth once a day   -- Indication: For Constipation     Colace 100 mg oral capsule  -- 1 cap(s) by mouth 2 times a day   -- Medication should be taken with plenty of water.    -- Indication: For Constipation     omeprazole 20 mg oral delayed release capsule  -- 1 cap(s) by mouth once a day  -- Indication: For gerd I will START or STAY ON the medications listed below when I get home from the hospital:    probiotic supplements  -- 1 day(s)  every 12 hours with antibiotics  -- Indication: For Home med    finasteride 5 mg oral tablet  -- 1 tab(s) by mouth once a day  -- Indication: For bph     Ultram 50 mg oral tablet  -- 1 tab(s) by mouth every 6 hours, As Needed -for moderate pain MDD:4  -- Caution federal law prohibits the transfer of this drug to any person other  than the person for whom it was prescribed.  May cause drowsiness.  Alcohol may intensify this effect.  Use care when operating dangerous machinery.  Obtain medical advice before taking any non-prescription drugs as some may affect the action of this medication.    -- Indication: For prn pain     Ascriptin 325 mg oral tablet  -- 1 tab(s) by mouth once a day   -- Take with food or milk.    -- Indication: For Dvt prophylaxis     tamsulosin 0.4 mg oral capsule  -- 1 cap(s) by mouth once a day (at bedtime)  -- Indication: For bph     ranolazine 500 mg oral tablet, extended release  -- 1 tab(s) by mouth 2 times a day  -- Indication: For CAD (coronary artery disease)    isosorbide mononitrate 30 mg oral tablet, extended release  -- 1 tab(s) by mouth once a day  -- Indication: For CAD (coronary artery disease)    citalopram 20 mg oral tablet  -- 1 tab(s) by mouth once a day  -- Indication: For Depression     glimepiride 4 mg oral tablet  -- 1 tab(s) by mouth once a day  -- Indication: For DMII    Lantus 100 units/mL subcutaneous solution  -- 40  subcutaneous once a day in the morning  -- Indication: For DMII    Crestor 10 mg oral tablet  -- 1 tab(s) by mouth once a day (at bedtime)  -- Indication: For HLD (hyperlipidemia)    Plavix 75 mg oral tablet  -- 1 tab(s) by mouth once a day  -- Indication: For CAD (coronary artery disease)    zolpidem 10 mg oral tablet  -- 1 tab(s) by mouth once a day (at bedtime)  -- Indication: For Anxiety     atenolol 25 mg oral tablet  -- 1 tab(s) by mouth once a day  -- Indication: For Htn     Norvasc 10 mg oral tablet  -- 1 tab(s) by mouth once a day  -- Indication: For Htn     ferrous sulfate 325 mg (65 mg elemental iron) oral tablet  -- 1 tab(s) by mouth once a day  -- Indication: For Anemia, chronic disease    docusate sodium 100 mg oral tablet  -- 1 tab(s) by mouth 2 times a day  -- Indication: For Constipation     Senna 8.6 mg oral tablet  -- 1 tab(s) by mouth once a day (at bedtime)  -- Indication: For Constipation     MiraLax oral powder for reconstitution  -- 17 gram(s) by mouth once a day   -- Dilute this medication with liquid before administration.  It is very important that you take or use this exactly as directed.  Do not skip doses or discontinue unless directed by your doctor.    -- Indication: For Constipation     senna oral tablet  -- 2 tab(s) by mouth once a day   -- Indication: For Constipation     Colace 100 mg oral capsule  -- 1 cap(s) by mouth 2 times a day   -- Medication should be taken with plenty of water.    -- Indication: For Constipation     omeprazole 20 mg oral delayed release capsule  -- 1 cap(s) by mouth once a day  -- Indication: For gerd     Bactrim  mg-160 mg oral tablet  -- 1 tab(s) by mouth 2 times a day   -- Avoid prolonged or excessive exposure to direct and/or artificial sunlight while taking this medication.  Finish all this medication unless otherwise directed by prescriber.  Medication should be taken with plenty of water.    -- Indication: For UTI (urinary tract infection)

## 2018-04-04 NOTE — PROGRESS NOTE ADULT - ATTENDING COMMENTS
please call with any questions, Dr. Floyd (cell: [phone])    NEPHROLOGY MEDICAL CARE, [facility]  MD Alejandro Mchugh MD Alexander Bangiev, MD Ljubisa Micic, MD    Answering Service: [phone]    Office: (Conrad Rodriguez MD)  [address] [place] [address]  Ph: [phone]  Fax: [phone]

## 2018-04-04 NOTE — PROGRESS NOTE ADULT - ASSESSMENT
89 yr old male with PMHX of CAD,HTN,Lipid D/O,Anemia,CRI,DM who presents s/p fall with RT hip pain.  1.Post-op ileus resolved.  2.HTN and CAD-asa,lopressor.  3.Anemia- Iron.  4.CRI-renal f/u, IVF.  5.DM-Insulin.  6.Pain control.  7.GI and DVT prophylaxis.

## 2018-04-04 NOTE — DISCHARGE NOTE ADULT - CARE PLAN
Principal Discharge DX:	Hip fracture, left  Secondary Diagnosis:	CAD (coronary artery disease)  Secondary Diagnosis:	Acute kidney injury  Secondary Diagnosis:	Essential hypertension  Secondary Diagnosis:	HLD (hyperlipidemia)  Secondary Diagnosis:	Type 2 diabetes mellitus without complication, unspecified long term insulin use status Principal Discharge DX:	Hip fracture, left  Goal:	Increase range of motion  Assessment and plan of treatment:	Please follow up with Dr. Sosa within 2 week   continue aspirin for 2 weeks  staples to be removed on 4/14  Secondary Diagnosis:	CAD (coronary artery disease)  Goal:	continue current regimen and follow up with PCP  Secondary Diagnosis:	Acute kidney injury  Goal:	continue current regimen and follow up with PCP  Secondary Diagnosis:	Essential hypertension  Goal:	continue current regimen and follow up with PCP  Secondary Diagnosis:	HLD (hyperlipidemia)  Goal:	continue current regimen and follow up with PCP  Secondary Diagnosis:	Type 2 diabetes mellitus without complication, unspecified long term insulin use status  Goal:	continue current regimen and follow up with PCP

## 2018-04-04 NOTE — PROGRESS NOTE ADULT - SUBJECTIVE AND OBJECTIVE BOX
CHIEF COMPLAINT:Patient is a 89y old  Male who presents with a chief complaint of s/p fall .Pt appears comfortable.    	  REVIEW OF SYSTEMS:  CONSTITUTIONAL: No fever, weight loss, or fatigue  EYES: No eye pain, visual disturbances, or discharge  ENT:  No difficulty hearing, tinnitus, vertigo; No sinus or throat pain  NECK: No pain or stiffness  RESPIRATORY: No cough, wheezing, chills or hemoptysis; No Shortness of Breath  CARDIOVASCULAR: No chest pain, palpitations, passing out, dizziness, or leg swelling  GASTROINTESTINAL: No abdominal or epigastric pain. No nausea, vomiting, or hematemesis; No diarrhea or constipation. No melena or hematochezia.  GENITOURINARY: No dysuria, frequency, hematuria, or incontinence  NEUROLOGICAL: No headaches, memory loss, loss of strength, numbness, or tremors  SKIN: No itching, burning, rashes, or lesions   LYMPH Nodes: No enlarged glands  ENDOCRINE: No heat or cold intolerance; No hair loss  MUSCULOSKELETAL: No joint pain or swelling; No muscle, back, or extremity pain  PSYCHIATRIC: No depression, anxiety, mood swings, or difficulty sleeping  HEME/LYMPH: No easy bruising, or bleeding gums  ALLERGY AND IMMUNOLOGIC: No hives or eczema	      PHYSICAL EXAM:  T(C): 36.5 (04-04-18 @ 05:54), Max: 37 (04-03-18 @ 21:30)  HR: 84 (04-04-18 @ 05:54) (77 - 87)  BP: 155/61 (04-04-18 @ 05:54) (151/53 - 157/61)  RR: 16 (04-04-18 @ 05:54) (16 - 18)  SpO2: 98% (04-04-18 @ 05:54) (98% - 99%)    I&O's Summary    03 Apr 2018 07:01  -  04 Apr 2018 07:00  --------------------------------------------------------  IN: 0 mL / OUT: 1000 mL / NET: -1000 mL        Appearance: Normal	  HEENT:   Normal oral mucosa, PERRL, EOMI	  Lymphatic: No lymphadenopathy  Cardiovascular: Normal S1 S2, No JVD, No murmurs, No edema  Respiratory: Lungs clear to auscultation	  Psychiatry: A & O x 3, Mood & affect appropriate  Gastrointestinal:  Soft, Non-tender, + BS	  Skin: No rashes, No ecchymoses, No cyanosis	  Neurologic: Non-focal  Extremities: Normal range of motion, No clubbing, cyanosis or edema  Vascular: Peripheral pulses palpable 2+ bilaterally    MEDICATIONS  (STANDING):  atorvastatin 40 milliGRAM(s) Oral at bedtime  cefTRIAXone   IVPB 1 Gram(s) IV Intermittent every 24 hours  citalopram 20 milliGRAM(s) Oral daily  finasteride 5 milliGRAM(s) Oral daily  heparin  Injectable 5000 Unit(s) SubCutaneous every 12 hours  insulin glargine Injectable (LANTUS) 15 Unit(s) SubCutaneous every morning  insulin lispro (HumaLOG) corrective regimen sliding scale   SubCutaneous every 4 hours  isosorbide   mononitrate ER Tablet (IMDUR) 30 milliGRAM(s) Oral daily  metoprolol tartrate 25 milliGRAM(s) Oral two times a day  ranolazine 500 milliGRAM(s) Oral two times a day  tamsulosin 0.4 milliGRAM(s) Oral at bedtime      	  LABS:	 	                      8.9    6.6   )-----------( 112      ( 04 Apr 2018 06:13 )             27.9     04-04    148<H>  |  120<H>  |  75<H>  ----------------------------<  185<H>  3.8   |  15<L>  |  2.84<H>    Ca    8.4      04 Apr 2018 06:13    TPro  5.5<L>  /  Alb  2.1<L>  /  TBili  0.5  /  DBili  x   /  AST  17  /  ALT  <6<L>  /  AlkPhos  80  04-04      Lipid Profile: Cholesterol 120  LDL 39  HDL 55      HgA1c: Hemoglobin A1C, Whole Blood: 5.6 % (04-02 @ 10:20)    TSH: Thyroid Stimulating Hormone, Serum: 2.77 uU/mL (03-30 @ 07:26)

## 2018-04-04 NOTE — PROGRESS NOTE ADULT - SUBJECTIVE AND OBJECTIVE BOX
Patient with post-op ileus, s/p right hip hemiarthroplasty POD # 5  Patient examined at bedside, no complaints.   No nausea, no vomiting, + flatus and BM  tolerating clears, Patient does not want CT at this time states that he  feels better and does not want further workup       Vital Signs Last 24 Hrs  T(C): 36.5 (04 Apr 2018 05:54), Max: 37 (03 Apr 2018 21:30)  T(F): 97.7 (04 Apr 2018 05:54), Max: 98.6 (03 Apr 2018 21:30)  HR: 84 (04 Apr 2018 05:54) (77 - 87)  BP: 155/61 (04 Apr 2018 05:54) (151/53 - 157/61)  BP(mean): --  RR: 16 (04 Apr 2018 05:54) (16 - 18)  SpO2: 98% (04 Apr 2018 05:54) (98% - 99%)    Physical Exam  General: AAOx3, No acute distress  Skin: No jaundice, no icterus  Abdomen: soft, nontender, nondistended   Extremities: non edematous, no calf pain bilaterally                                       8.9    6.6   )-----------( 112      ( 04 Apr 2018 06:13 )             27.9   04-04    148<H>  |  120<H>  |  75<H>  ----------------------------<  185<H>  3.8   |  15<L>  |  2.84<H>    Ca    8.4      04 Apr 2018 06:13    TPro  5.5<L>  /  Alb  2.1<L>  /  TBili  0.5  /  DBili  x   /  AST  17  /  ALT  <6<L>  /  AlkPhos  80  04-04

## 2018-04-04 NOTE — DISCHARGE NOTE ADULT - CARE PROVIDER_API CALL
Mac Sosa), Orthopaedic Surgery  [id number] [place] [address]  Phone: [phone]  Fax: [phone]

## 2018-04-04 NOTE — PROGRESS NOTE ADULT - SUBJECTIVE AND OBJECTIVE BOX
Patient is a 89y old  Male who presents with a chief complaint of s/p fall (29 Mar 2018 14:30)      INTERVAL HPI/OVERNIGHT EVENTS:  no complaints    VITAL SIGNS:  T(F): 97.7 (18 @ 05:54)  HR: 84 (18 @ 05:54)  BP: 155/61 (18 @ 05:54)  RR: 16 (18 @ 05:54)  SpO2: 98% (18 @ 05:54)  Wt(kg): --  I&O's Detail    2018 07:01  -  2018 07:00  --------------------------------------------------------  IN:  Total IN: 0 mL    OUT:    Indwelling Catheter - Urethral: 1000 mL  Total OUT: 1000 mL    Total NET: -1000 mL              REVIEW OF SYSTEMS:    CONSTITUTIONAL:  No fevers, chills, sweats    HEENT:  Eyes:  No diplopia or blurred vision. ENT:  No earache, sore throat or runny nose.    CARDIOVASCULAR:  No pressure, squeezing, tightness, or heaviness about the chest; no palpitations.    RESPIRATORY:  no sob    GASTROINTESTINAL:  No abdominal pain, nausea, vomiting or diarrhea.    GENITOURINARY:  No dysuria, frequency or urgency.    NEUROLOGIC:  No paresthesias, fasciculations, seizures or weakness.    PSYCHIATRIC:  No disorder of thought or mood.      PHYSICAL EXAM:    Constitutional:no complaints  ENMT:atnc  Neck:supple  Respiratory:clear  Cardiovascular:rr  Gastrointestinal:softm bowel sounds present  Extremities:no edema, rt hip clean  Vascular:intact  Neurological:non focal  Musculoskeletal:no edema, normal rom    MEDICATIONS  (STANDING):  atorvastatin 40 milliGRAM(s) Oral at bedtime  cefTRIAXone   IVPB 1 Gram(s) IV Intermittent every 24 hours  citalopram 20 milliGRAM(s) Oral daily  finasteride 5 milliGRAM(s) Oral daily  heparin  Injectable 5000 Unit(s) SubCutaneous every 12 hours  insulin glargine Injectable (LANTUS) 15 Unit(s) SubCutaneous every morning  insulin lispro (HumaLOG) corrective regimen sliding scale   SubCutaneous every 4 hours  isosorbide   mononitrate ER Tablet (IMDUR) 30 milliGRAM(s) Oral daily  metoprolol tartrate 25 milliGRAM(s) Oral two times a day  ranolazine 500 milliGRAM(s) Oral two times a day  sodium chloride 0.45%. 1000 milliLiter(s) (100 mL/Hr) IV Continuous <Continuous>  tamsulosin 0.4 milliGRAM(s) Oral at bedtime    MEDICATIONS  (PRN):  acetaminophen   Tablet 650 milliGRAM(s) Oral every 6 hours PRN For Temp over 38.3 C (100.94 F)  acetaminophen   Tablet. 650 milliGRAM(s) Oral every 6 hours PRN Mild Pain (1 - 3)  acetaminophen  IVPB. 1000 milliGRAM(s) IV Intermittent once PRN Moderate Pain (4 - 6)  benzocaine 15 mG/menthol 3.6 mG Lozenge 1 Lozenge Oral every 2 hours PRN Sore Throat  magnesium hydroxide Suspension 30 milliLiter(s) Oral daily PRN Constipation  ondansetron Injectable 4 milliGRAM(s) IV Push every 6 hours PRN Nausea and/or Vomiting      Allergies    No Known Allergies            LABS:                        8.9    6.6   )-----------( 112      ( 2018 06:13 )             27.9     04-04    148<H>  |  120<H>  |  75<H>  ----------------------------<  185<H>  3.8   |  15<L>  |  2.84<H>    Ca    8.4      2018 06:13    TPro  5.5<L>  /  Alb  2.1<L>  /  TBili  0.5  /  DBili  x   /  AST  17  /  ALT  <6<L>  /  AlkPhos  80  04-04      Urinalysis Basic - ( 2018 15:33 )    Color: Yellow / Appearance: Clear / S.015 / pH: x  Gluc: x / Ketone: Negative  / Bili: Negative / Urobili: Negative   Blood: x / Protein: 30 mg/dL / Nitrite: Negative   Leuk Esterase: Moderate / RBC: 2-5 /HPF / WBC >50 /HPF   Sq Epi: x / Non Sq Epi: Occasional /HPF / Bacteria: Few /HPF            CAPILLARY BLOOD GLUCOSE      POCT Blood Glucose.: 188 mg/dL (2018 05:49)  POCT Blood Glucose.: 177 mg/dL (2018 02:20)  POCT Blood Glucose.: 221 mg/dL (2018 21:24)  POCT Blood Glucose.: 203 mg/dL (2018 18:23)  POCT Blood Glucose.: 217 mg/dL (2018 16:36)  POCT Blood Glucose.: 185 mg/dL (2018 15:04)

## 2018-04-04 NOTE — PROGRESS NOTE ADULT - PROBLEM SELECTOR PLAN 1
LOBITO due to pre-renal azotemia due low perfusion to kidney from volume depletion and low hb complicated by UTI vs ATN.  -scr is slowly improving and continue 1/2 NS at 60cc/hr. monitor i/o's closely.  -urinalysis, urine lytes noted. spot protein to creatinine ratio is 0.92mg.  -renal sono shows no hydronephrosis.   -Adjust meds to eGFR and avoid IV Gadolinium contrast,NSAIDs, and phosphate enema.  -Monitor I/O's daily.   -Monitor SMA daily.

## 2018-04-04 NOTE — DISCHARGE NOTE ADULT - HOSPITAL COURSE
Patient is a 89M from home, AAOx3, lives with wife, ambulates independently, wife at bedside for [place] translation, with PMH HTN, DM, CAD, CABG 7 years ago, L Hip Fracture 8 years ago (s/p repair), and HLD presenting to the ED s/p mechanical fall over couch yesterday night 11pm, falling onto his R hip. Denies fever, chills, SOB, palpitations, chest pain, nausea, vomiting, diarrhea or constipation, only ROS + R hip pain s/p fracture. Denies syncope, LOC or head injury at time of fall  s/p rt hip hemiarthroplasty on 3/30 by Dr. Sosa   -acute on chronic renal insuff-pre renal per nephro-improving daily  -non anion gap acidosis 2/2 ckd  -nausea/vomiting-resolved  -hx; htn,dm,cad-cabg,hld  -e coli uti sensitive to rocephin/bactrim

## 2018-04-04 NOTE — PROGRESS NOTE ADULT - SUBJECTIVE AND OBJECTIVE BOX
CC: pt is tolerated some food but not much.    Vital Signs Last 24 Hrs  T(C): 36.7 (04 Apr 2018 13:56), Max: 37 (03 Apr 2018 21:30)  T(F): 98.1 (04 Apr 2018 13:56), Max: 98.6 (03 Apr 2018 21:30)  HR: 69 (04 Apr 2018 15:11) (69 - 84)  BP: 141/66 (04 Apr 2018 15:11) (141/66 - 168/63)  BP(mean): --  RR: 17 (04 Apr 2018 13:56) (16 - 18)  SpO2: 98% (04 Apr 2018 15:11) (98% - 100%)    04-03 @ 07:01  -  04-04 @ 07:00  --------------------------------------------------------  IN: 0 mL / OUT: 1000 mL / NET: -1000 mL    04-04 @ 07:01  -  04-04 @ 18:09  --------------------------------------------------------  IN: 0 mL / OUT: 400 mL / NET: -400 mL        PHYSICAL EXAM:  GENERAL: No acute respiratory distress.  HEAD:  Atraumatic, Normocephalic  EYES: conjunctiva and sclera clear  ENMT: moist mucous membranes;   NECK: Supple, No JVD  NERVOUS SYSTEM:  Awake, Alert & Oriented X3,   CHEST/LUNG: Clear to percussion bilaterally; No rales, rhonchi, wheezing, or rubs  HEART: Regular rate and rhythm; No murmurs, rubs, or gallops  ABDOMEN: soft, Non-tender, distended; hypoactive Bowel sounds   EXTREMITIES:  No cyanosis, or edema  SKIN: No rashes or lesions      MEDICATIONS:  acetaminophen   Tablet 650 milliGRAM(s) Oral every 6 hours PRN  acetaminophen   Tablet. 650 milliGRAM(s) Oral every 6 hours PRN  acetaminophen  IVPB. 1000 milliGRAM(s) IV Intermittent once PRN  atorvastatin 40 milliGRAM(s) Oral at bedtime  benzocaine 15 mG/menthol 3.6 mG Lozenge 1 Lozenge Oral every 2 hours PRN  cefTRIAXone   IVPB 1 Gram(s) IV Intermittent every 24 hours  citalopram 20 milliGRAM(s) Oral daily  finasteride 5 milliGRAM(s) Oral daily  heparin  Injectable 5000 Unit(s) SubCutaneous every 12 hours  insulin glargine Injectable (LANTUS) 15 Unit(s) SubCutaneous every morning  insulin lispro (HumaLOG) corrective regimen sliding scale   SubCutaneous every 4 hours  isosorbide   mononitrate ER Tablet (IMDUR) 30 milliGRAM(s) Oral daily  magnesium hydroxide Suspension 30 milliLiter(s) Oral daily PRN  metoprolol tartrate 25 milliGRAM(s) Oral two times a day  ondansetron Injectable 4 milliGRAM(s) IV Push every 6 hours PRN  ranolazine 500 milliGRAM(s) Oral two times a day  sodium chloride 0.45%. 1000 milliLiter(s) IV Continuous <Continuous>  tamsulosin 0.4 milliGRAM(s) Oral at bedtime      LABS:                        8.9    6.6   )-----------( 112      ( 04 Apr 2018 06:13 )             27.9     04-04    148<H>  |  120<H>  |  75<H>  ----------------------------<  185<H>  3.8   |  15<L>  |  2.84<H>    Ca    8.4      04 Apr 2018 06:13    TPro  5.5<L>  /  Alb  2.1<L>  /  TBili  0.5  /  DBili  x   /  AST  17  /  ALT  <6<L>  /  AlkPhos  80  04-04          Urine studies  Urea Nitrogen,  Random Urine: 519 mg/dL (04-03 @ 01:40)  Creatinine, Random Urine: 37 mg/dL (04-02 @ 15:34)  Sodium, Random Urine: 75 mmol/L (04-02 @ 15:34)  Potassium, Random Urine: 18 mmol/L <L> (04-02 @ 15:34)  Osmolality, Random Urine: 400 mos/kg (04-02 @ 15:33)    PTH and Vit D:

## 2018-04-04 NOTE — PROGRESS NOTE ADULT - ASSESSMENT
-s/p rt hip hemiarthroplasty  -acute on chronic renal insuff-pre renal per nephro-improving  -non anion gap acidosis 2/2 ckd  -post-op ileus-resolved  -hx; htn,dm,cad-cabg,hld    PLAN;            nephro f/u           dvt ppx           phys tx           ortho f/u

## 2018-04-04 NOTE — PROGRESS NOTE ADULT - ASSESSMENT
89yo male with post op ileus s/p right hip hemiarthroplasty POD #5 with resolved ileus     1. regular diet   2. bowel regimen   3. d/c planning

## 2018-04-05 LAB
ANION GAP SERPL CALC-SCNC: 11 MMOL/L — SIGNIFICANT CHANGE UP (ref 5–17)
BUN SERPL-MCNC: 60 MG/DL — HIGH (ref 7–18)
CALCIUM SERPL-MCNC: 8 MG/DL — LOW (ref 8.4–10.5)
CHLORIDE SERPL-SCNC: 117 MMOL/L — HIGH (ref 96–108)
CO2 SERPL-SCNC: 16 MMOL/L — LOW (ref 22–31)
CREAT SERPL-MCNC: 2.23 MG/DL — HIGH (ref 0.5–1.3)
GLUCOSE SERPL-MCNC: 181 MG/DL — HIGH (ref 70–99)
POTASSIUM SERPL-MCNC: 4.1 MMOL/L — SIGNIFICANT CHANGE UP (ref 3.5–5.3)
POTASSIUM SERPL-SCNC: 4.1 MMOL/L — SIGNIFICANT CHANGE UP (ref 3.5–5.3)
SODIUM SERPL-SCNC: 144 MMOL/L — SIGNIFICANT CHANGE UP (ref 135–145)
SURGICAL PATHOLOGY FINAL REPORT - CH: SIGNIFICANT CHANGE UP

## 2018-04-05 PROCEDURE — 74019 RADEX ABDOMEN 2 VIEWS: CPT | Mod: 26

## 2018-04-05 PROCEDURE — 99232 SBSQ HOSP IP/OBS MODERATE 35: CPT

## 2018-04-05 RX ORDER — METOCLOPRAMIDE HCL 10 MG
10 TABLET ORAL THREE TIMES A DAY
Qty: 0 | Refills: 0 | Status: DISCONTINUED | OUTPATIENT
Start: 2018-04-05 | End: 2018-04-06

## 2018-04-05 RX ORDER — INSULIN GLARGINE 100 [IU]/ML
12 INJECTION, SOLUTION SUBCUTANEOUS EVERY MORNING
Qty: 0 | Refills: 0 | Status: DISCONTINUED | OUTPATIENT
Start: 2018-04-05 | End: 2018-04-05

## 2018-04-05 RX ORDER — POLYETHYLENE GLYCOL 3350 17 G/17G
17 POWDER, FOR SOLUTION ORAL DAILY
Qty: 0 | Refills: 0 | Status: DISCONTINUED | OUTPATIENT
Start: 2018-04-05 | End: 2018-04-06

## 2018-04-05 RX ORDER — INSULIN LISPRO 100/ML
3 VIAL (ML) SUBCUTANEOUS
Qty: 0 | Refills: 0 | Status: DISCONTINUED | OUTPATIENT
Start: 2018-04-05 | End: 2018-04-06

## 2018-04-05 RX ORDER — INSULIN GLARGINE 100 [IU]/ML
15 INJECTION, SOLUTION SUBCUTANEOUS EVERY MORNING
Qty: 0 | Refills: 0 | Status: DISCONTINUED | OUTPATIENT
Start: 2018-04-05 | End: 2018-04-06

## 2018-04-05 RX ADMIN — ATORVASTATIN CALCIUM 40 MILLIGRAM(S): 80 TABLET, FILM COATED ORAL at 21:10

## 2018-04-05 RX ADMIN — HEPARIN SODIUM 5000 UNIT(S): 5000 INJECTION INTRAVENOUS; SUBCUTANEOUS at 17:55

## 2018-04-05 RX ADMIN — Medication 2: at 10:49

## 2018-04-05 RX ADMIN — RANOLAZINE 500 MILLIGRAM(S): 500 TABLET, FILM COATED, EXTENDED RELEASE ORAL at 17:55

## 2018-04-05 RX ADMIN — Medication 1: at 17:55

## 2018-04-05 RX ADMIN — ONDANSETRON 4 MILLIGRAM(S): 8 TABLET, FILM COATED ORAL at 08:51

## 2018-04-05 RX ADMIN — Medication 25 MILLIGRAM(S): at 05:49

## 2018-04-05 RX ADMIN — Medication 25 MILLIGRAM(S): at 17:56

## 2018-04-05 RX ADMIN — INSULIN GLARGINE 12 UNIT(S): 100 INJECTION, SOLUTION SUBCUTANEOUS at 13:45

## 2018-04-05 RX ADMIN — Medication 1: at 13:46

## 2018-04-05 RX ADMIN — Medication 10 MILLIGRAM(S): at 21:10

## 2018-04-05 RX ADMIN — HEPARIN SODIUM 5000 UNIT(S): 5000 INJECTION INTRAVENOUS; SUBCUTANEOUS at 05:48

## 2018-04-05 RX ADMIN — FINASTERIDE 5 MILLIGRAM(S): 5 TABLET, FILM COATED ORAL at 11:51

## 2018-04-05 RX ADMIN — CITALOPRAM 20 MILLIGRAM(S): 10 TABLET, FILM COATED ORAL at 11:51

## 2018-04-05 RX ADMIN — ISOSORBIDE MONONITRATE 30 MILLIGRAM(S): 60 TABLET, EXTENDED RELEASE ORAL at 11:51

## 2018-04-05 RX ADMIN — Medication 10 MILLIGRAM(S): at 13:47

## 2018-04-05 RX ADMIN — RANOLAZINE 500 MILLIGRAM(S): 500 TABLET, FILM COATED, EXTENDED RELEASE ORAL at 05:49

## 2018-04-05 RX ADMIN — TAMSULOSIN HYDROCHLORIDE 0.4 MILLIGRAM(S): 0.4 CAPSULE ORAL at 21:10

## 2018-04-05 RX ADMIN — CEFTRIAXONE 100 GRAM(S): 500 INJECTION, POWDER, FOR SOLUTION INTRAMUSCULAR; INTRAVENOUS at 05:49

## 2018-04-05 RX ADMIN — Medication 1 ENEMA: at 10:49

## 2018-04-05 NOTE — PROGRESS NOTE ADULT - ASSESSMENT
1.diabetes  sugars 100-225  now on diet  cont lantus 15 am/humalog premeals 3 units  will follow/adjust

## 2018-04-05 NOTE — PROGRESS NOTE ADULT - SUBJECTIVE AND OBJECTIVE BOX
pt seen this am  feeling well  ICU Vital Signs Last 24 Hrs  T(C): 36.7 (05 Apr 2018 14:31), Max: 36.8 (04 Apr 2018 21:30)  T(F): 98 (05 Apr 2018 14:31), Max: 98.2 (04 Apr 2018 21:30)  HR: 89 (05 Apr 2018 17:50) (79 - 91)  BP: 135/60 (05 Apr 2018 17:50) (130/60 - 170/63)  BP(mean): --  ABP: --  ABP(mean): --  RR: 18 (05 Apr 2018 14:31) (16 - 18)  SpO2: 99% (05 Apr 2018 15:19) (99% - 100%)                        8.9    6.6   )-----------( 112      ( 04 Apr 2018 06:13 )             27.9   abdomen soft, non tender, less distended  On reg diet

## 2018-04-05 NOTE — PROGRESS NOTE ADULT - SUBJECTIVE AND OBJECTIVE BOX
HPI:  Patient is a 89M from home, AAOx3, lives with wife, ambulates independently, wife at bedside for [place] translation, with PMH HTN, DM, CAD, CABG 7 years ago, L Hip Fracture 8 years ago (s/p repair), and HLD presenting to the ED s/p mechanical fall over couch yesterday night 11pm, falling onto his R hip. Denies fever, chills, SOB, palpitations, chest pain, nausea, vomiting, diarrhea or constipation, only ROS + R hip pain s/p fracture. Denies syncope, LOC or head injury at time of fall.  long term diabetic on insulin  s/p ileus  arf/ckd  PMH: as per HPI  Surgical Hx: L hip surgery  Fam Hx: mother and father- DM  Social Hx: neg for smoking or etoh  Allergies: NKDA (29 Mar 2018 14:30)      Meds:  cefTRIAXone   IVPB 1 Gram(s) IV Intermittent every 24 hours    Allergies:  Allergies    No Known Allergies    Intolerances        REVIEW OF SYSTEMS:preet diet  no vomiting  no cp    CONSTITUTIONAL: No weakness, fevers or chills  EYES/ENT: No visual changes;  No vertigo or throat pain   NECK: No pain or stiffness  RESPIRATORY: No cough, wheezing, hemoptysis; No shortness of breath  CARDIOVASCULAR: No chest pain or palpitations  GASTROINTESTINAL: No abdominal or epigastric pain. No nausea, vomiting, or hematemesis; No diarrhea or constipation. No melena or hematochezia.  GENITOURINARY: No dysuria, frequency or hematuria  NEUROLOGICAL: No numbness or weakness  SKIN: No itching, burning, rashes, or lesions   All other review of systems is negative unless indicated above.    PHYSICAL EXAM:    Vital Signs Last 24 Hrs  T(C): 36.7 (05 Apr 2018 14:31), Max: 36.8 (04 Apr 2018 21:30)  T(F): 98 (05 Apr 2018 14:31), Max: 98.2 (04 Apr 2018 21:30)  HR: 89 (05 Apr 2018 17:50) (79 - 91)  BP: 135/60 (05 Apr 2018 17:50) (130/60 - 170/63)  BP(mean): --  RR: 18 (05 Apr 2018 14:31) (16 - 18)  SpO2: 99% (05 Apr 2018 15:19) (99% - 100%)    HEENT:elderly male  awake  lungs ae fair  cardia reg    Neck:  [  ] Supple  [  ] Lymphadenopathy  [  ] JVD  [  ] Masses  [  ] WNL    CHEST/Respiratory: [ ] Clear to auscultation     [  ] Rales      [  ] Rhonchi      [  ] Wheezing       [  ] Chest Tenderness     [  ] WNL    Cardiovascular:  [  ]S1 S2  [  ] Reg  [  ] Irreg   [  ] No Murmurs   [  ] Murmurs  [  ] Systolic [  ] Diastolic    Gastrointestinal:  [  ] Bowel Sounds  [   ] ABD Soft  [  ] ABD Distention   [  ] No Tenderness [  ] Tenderness  [  ] Organomegaly  [  ] Guarding rigidity  [  ] No Guarding rigidity  [  ] Rebound Tenderness [  ] No Rebound Tenderness    Extremities: [  ] Edema  [  ] No Edema  [  ] Clubbing   [  ] Cyanosis  [  ] Palpable peripheral pulses                          [  ] Tender calf muscles    [  ] No Tender Calf Muscles    Neurological:  [  ] Alert  [  ] Awake  [  ] Oriented  x                              [  ] Focal weakness  [  ] No Focal Weakness    Skin:  [  ] Thrombophlebitis  [  ] Rashes  [  ] Dry  [  ] Ulcers    Ortho:  [  ] Joint Swelling  [  ] Joint erythema [  ] DJD [  ] Increased Temperature to Touch      LABS/DIAGNOSTIC TESTS                          8.9    6.6   )-----------( 112      ( 04 Apr 2018 06:13 )             27.9         04-05    144  |  117<H>  |  60<H>  ----------------------------<  181<H>  4.1   |  16<L>  |  2.23<H>    Ca    8.0<L>      05 Apr 2018 07:12    TPro  5.5<L>  /  Alb  2.1<L>  /  TBili  0.5  /  DBili  x   /  AST  17  /  ALT  <6<L>  /  AlkPhos  80  04-04      CAPILLARY BLOOD GLUCOSE      POCT Blood Glucose.: 177 mg/dL (05 Apr 2018 17:52)  POCT Blood Glucose.: 204 mg/dL (05 Apr 2018 15:47)  POCT Blood Glucose.: 191 mg/dL (05 Apr 2018 12:55)  POCT Blood Glucose.: 207 mg/dL (05 Apr 2018 10:23)  POCT Blood Glucose.: 186 mg/dL (05 Apr 2018 07:46)  POCT Blood Glucose.: 165 mg/dL (05 Apr 2018 05:34)  POCT Blood Glucose.: 143 mg/dL (05 Apr 2018 01:48)  POCT Blood Glucose.: 136 mg/dL (04 Apr 2018 21:57)      LIVER FUNCTIONS - ( 04 Apr 2018 06:13 )  Alb: 2.1 g/dL / Pro: 5.5 g/dL / ALK PHOS: 80 U/L / ALT: <6 U/L DA / AST: 17 U/L / GGT: x           Hemoglobin A1C, Whole Blood: 5.6 % (04-02 @ 10:20)    Thyroid Stimulating Hormone, Serum: 2.77 uU/mL (03-30 @ 07:26)    CULTURES:       RADIOLOGY  CXR:    acetaminophen   Tablet 650 milliGRAM(s) Oral every 6 hours PRN  acetaminophen   Tablet. 650 milliGRAM(s) Oral every 6 hours PRN  acetaminophen  IVPB. 1000 milliGRAM(s) IV Intermittent once PRN  atorvastatin 40 milliGRAM(s) Oral at bedtime  benzocaine 15 mG/menthol 3.6 mG Lozenge 1 Lozenge Oral every 2 hours PRN  cefTRIAXone   IVPB 1 Gram(s) IV Intermittent every 24 hours  citalopram 20 milliGRAM(s) Oral daily  finasteride 5 milliGRAM(s) Oral daily  heparin  Injectable 5000 Unit(s) SubCutaneous every 12 hours  insulin glargine Injectable (LANTUS) 15 Unit(s) SubCutaneous every morning  insulin lispro (HumaLOG) corrective regimen sliding scale   SubCutaneous every 4 hours  insulin lispro Injectable (HumaLOG) 3 Unit(s) SubCutaneous three times a day before meals  isosorbide   mononitrate ER Tablet (IMDUR) 30 milliGRAM(s) Oral daily  magnesium hydroxide Suspension 30 milliLiter(s) Oral daily PRN  metoclopramide 10 milliGRAM(s) Oral three times a day  metoprolol tartrate 25 milliGRAM(s) Oral two times a day  ondansetron Injectable 4 milliGRAM(s) IV Push every 6 hours PRN  polyethylene glycol 3350 17 Gram(s) Oral daily  ranolazine 500 milliGRAM(s) Oral two times a day  sodium chloride 0.45%. 1000 milliLiter(s) IV Continuous <Continuous>  tamsulosin 0.4 milliGRAM(s) Oral at bedtime

## 2018-04-05 NOTE — PROGRESS NOTE ADULT - PROBLEM SELECTOR PLAN 3
h/h is better and s/p 1 unit of prbc  -F/u CBC daily  -transfuse if HB < 7.0.
hb is stable and s/p 1 unit of prbc  -F/u CBC daily  -transfuse if HB < 7.0.
hb is stable and s/p 1 unit of prbc  -F/u CBC daily  -transfuse if HB < 7.0.
no labs and s/p 1 unit of prbc  -F/u CBC daily  -transfuse if HB < 7.0.
baseline Cr 2  Cr 3 today  holding enalapril and allopurinol

## 2018-04-05 NOTE — PROGRESS NOTE ADULT - PROBLEM SELECTOR PROBLEM 4
Essential hypertension
Hypertension

## 2018-04-05 NOTE — PROGRESS NOTE ADULT - PROBLEM SELECTOR PROBLEM 7
Hyperphosphatemia
Need for prophylactic measure

## 2018-04-05 NOTE — PROGRESS NOTE ADULT - PROBLEM SELECTOR PLAN 1
LOBITO due to pre-renal azotemia due low perfusion to kidney from volume depletion and low hb complicated by UTI vs ATN.  -scr back to his baseline and continue 1/2 NS at 60cc/hr since npo due nausea and vomiting. monitor i/o's closely.  -urinalysis, urine lytes noted. spot protein to creatinine ratio is 0.92mg.  -renal sono shows no hydronephrosis.   -Adjust meds to eGFR and avoid IV Gadolinium contrast,NSAIDs, and phosphate enema.  -Monitor I/O's daily.   -Monitor SMA daily.

## 2018-04-05 NOTE — PROGRESS NOTE ADULT - PROBLEM SELECTOR PLAN 5
NAGMA due to renal failure  -continue to monitor co2 daily.  -add sodium bicarbonate 650mg oral tid if able to take oral meds.

## 2018-04-05 NOTE — PROGRESS NOTE ADULT - SUBJECTIVE AND OBJECTIVE BOX
CHIEF COMPLAINT:Patient is a 89y old  Male who presents with a chief complaint of s/p fall .Pt has nausea and vomting.    	  REVIEW OF SYSTEMS:  CONSTITUTIONAL: No fever, weight loss, or fatigue  EYES: No eye pain, visual disturbances, or discharge  ENT:  No difficulty hearing, tinnitus, vertigo; No sinus or throat pain  NECK: No pain or stiffness  RESPIRATORY: No cough, wheezing, chills or hemoptysis; No Shortness of Breath  CARDIOVASCULAR: No chest pain, palpitations, passing out, dizziness, or leg swelling  GASTROINTESTINAL: No abdominal or epigastric pain. + nausea, +vomiting; No diarrhea or constipation. No melena or hematochezia.  GENITOURINARY: No dysuria, frequency, hematuria, or incontinence  NEUROLOGICAL: No headaches, memory loss, loss of strength, numbness, or tremors  SKIN: No itching, burning, rashes, or lesions   LYMPH Nodes: No enlarged glands  ENDOCRINE: No heat or cold intolerance; No hair loss  MUSCULOSKELETAL: No joint pain or swelling; No muscle, back, or extremity pain  PSYCHIATRIC: No depression, anxiety, mood swings, or difficulty sleeping  HEME/LYMPH: No easy bruising, or bleeding gums  ALLERGY AND IMMUNOLOGIC: No hives or eczema	      PHYSICAL EXAM:  T(C): 36.7 (04-05-18 @ 05:57), Max: 36.8 (04-04-18 @ 21:30)  HR: 80 (04-05-18 @ 05:57) (69 - 81)  BP: 157/59 (04-05-18 @ 05:57) (141/66 - 170/63)  RR: 16 (04-05-18 @ 05:57) (16 - 17)  SpO2: 100% (04-05-18 @ 05:57) (98% - 100%)    04 Apr 2018 07:01  -  05 Apr 2018 07:00  --------------------------------------------------------  IN: 0 mL / OUT: 400 mL / NET: -400 mL        Appearance: Normal	  HEENT:   Normal oral mucosa, PERRL, EOMI	  Lymphatic: No lymphadenopathy  Cardiovascular: Normal S1 S2, No JVD, No murmurs, No edema  Respiratory: Lungs clear to auscultation	  Psychiatry: A & O x 3, Mood & affect appropriate  Gastrointestinal:  Soft, Non-tender, + BS	  Skin: No rashes, No ecchymoses, No cyanosis	  Neurologic: Non-focal  Extremities: Normal range of motion, No clubbing, cyanosis or edema  Vascular: Peripheral pulses palpable 2+ bilaterally    MEDICATIONS  (STANDING):  atorvastatin 40 milliGRAM(s) Oral at bedtime  cefTRIAXone   IVPB 1 Gram(s) IV Intermittent every 24 hours  citalopram 20 milliGRAM(s) Oral daily  finasteride 5 milliGRAM(s) Oral daily  heparin  Injectable 5000 Unit(s) SubCutaneous every 12 hours  insulin glargine Injectable (LANTUS) 12 Unit(s) SubCutaneous every morning  insulin lispro (HumaLOG) corrective regimen sliding scale   SubCutaneous every 4 hours  isosorbide   mononitrate ER Tablet (IMDUR) 30 milliGRAM(s) Oral daily  metoclopramide 10 milliGRAM(s) Oral three times a day  metoprolol tartrate 25 milliGRAM(s) Oral two times a day  polyethylene glycol 3350 17 Gram(s) Oral daily  ranolazine 500 milliGRAM(s) Oral two times a day  sodium biphosphate Rectal Enema 1 Enema Rectal once  sodium chloride 0.45%. 1000 milliLiter(s) (60 mL/Hr) IV Continuous <Continuous>  tamsulosin 0.4 milliGRAM(s) Oral at bedtime      	  LABS:	 	                     8.9    6.6   )-----------( 112      ( 04 Apr 2018 06:13 )             27.9     04-05    144  |  117<H>  |  60<H>  ----------------------------<  181<H>  4.1   |  16<L>  |  2.23<H>    Ca    8.0<L>      05 Apr 2018 07:12    TPro  5.5<L>  /  Alb  2.1<L>  /  TBili  0.5  /  DBili  x   /  AST  17  /  ALT  <6<L>  /  AlkPhos  80  04-04    Lipid Profile: Cholesterol 120  LDL 39  HDL 55      HgA1c: Hemoglobin A1C, Whole Blood: 5.6 % (04-02 @ 10:20)    TSH: Thyroid Stimulating Hormone, Serum: 2.77 uU/mL (03-30 @ 07:26)

## 2018-04-05 NOTE — PROGRESS NOTE ADULT - PROBLEM SELECTOR PLAN 6
u/a +ve, urine culture shows e.coli  continue rocephin and awaiting c/s.
u/a +ve, urine culture shows e.coli  continue rocephin and awaiting c/s.
u/a +ve, urine culture shows e.coli  continue rocephin and change to oral abx as per primary team..
u/a +ve, urine culture shows e.coli  continue rocephin and change to oral abx as per primary team..
c/w statin  f/u lipid profile

## 2018-04-05 NOTE — PROGRESS NOTE ADULT - PROBLEM SELECTOR PROBLEM 6
UTI (urinary tract infection)
HLD (hyperlipidemia)

## 2018-04-05 NOTE — PROGRESS NOTE ADULT - ASSESSMENT
89 yr old male with PMHX of CAD,HTN,Lipid D/O,Anemia,CRI,DM who presents s/p fall with RT hip pain.  1.Fleet enemax1.  2.HTN and CAD-asa,lopressor.  3.Anemia- Iron.  4.CRI-renal f/u, IVF.  5.DM-Insulin.  6.Pain control.  7.GI and DVT prophylaxis.

## 2018-04-05 NOTE — PROGRESS NOTE ADULT - SUBJECTIVE AND OBJECTIVE BOX
CC: still having nausea and vomiting.    Vital Signs Last 24 Hrs  T(C): 36.7 (05 Apr 2018 05:57), Max: 36.8 (04 Apr 2018 21:30)  T(F): 98 (05 Apr 2018 05:57), Max: 98.2 (04 Apr 2018 21:30)  HR: 80 (05 Apr 2018 05:57) (69 - 81)  BP: 157/59 (05 Apr 2018 05:57) (141/66 - 170/63)  BP(mean): --  RR: 16 (05 Apr 2018 05:57) (16 - 17)  SpO2: 100% (05 Apr 2018 05:57) (98% - 100%)    04-04 @ 07:01  -  04-05 @ 07:00  --------------------------------------------------------  IN: 0 mL / OUT: 400 mL / NET: -400 mL        PHYSICAL EXAM:  GENERAL: No acute respiratory distress.  HEAD:  Atraumatic, Normocephalic  EYES: conjunctiva and sclera clear  ENMT: moist mucous membranes;   NECK: Supple, No JVD  NERVOUS SYSTEM:  Awake, Alert & Oriented X3,   CHEST/LUNG: Clear to percussion bilaterally; No rales, rhonchi, wheezing, or rubs  HEART: Regular rate and rhythm; No murmurs, rubs, or gallops  ABDOMEN: soft, Non-tender, distended; hypoactive Bowel sounds   EXTREMITIES:  No cyanosis, or edema  SKIN: No rashes or lesions      MEDICATIONS:  acetaminophen   Tablet 650 milliGRAM(s) Oral every 6 hours PRN  acetaminophen   Tablet. 650 milliGRAM(s) Oral every 6 hours PRN  acetaminophen  IVPB. 1000 milliGRAM(s) IV Intermittent once PRN  atorvastatin 40 milliGRAM(s) Oral at bedtime  benzocaine 15 mG/menthol 3.6 mG Lozenge 1 Lozenge Oral every 2 hours PRN  cefTRIAXone   IVPB 1 Gram(s) IV Intermittent every 24 hours  citalopram 20 milliGRAM(s) Oral daily  finasteride 5 milliGRAM(s) Oral daily  heparin  Injectable 5000 Unit(s) SubCutaneous every 12 hours  insulin glargine Injectable (LANTUS) 12 Unit(s) SubCutaneous every morning  insulin lispro (HumaLOG) corrective regimen sliding scale   SubCutaneous every 4 hours  isosorbide   mononitrate ER Tablet (IMDUR) 30 milliGRAM(s) Oral daily  magnesium hydroxide Suspension 30 milliLiter(s) Oral daily PRN  metoclopramide 10 milliGRAM(s) Oral three times a day  metoprolol tartrate 25 milliGRAM(s) Oral two times a day  ondansetron Injectable 4 milliGRAM(s) IV Push every 6 hours PRN  polyethylene glycol 3350 17 Gram(s) Oral daily  ranolazine 500 milliGRAM(s) Oral two times a day  sodium chloride 0.45%. 1000 milliLiter(s) IV Continuous <Continuous>  tamsulosin 0.4 milliGRAM(s) Oral at bedtime      LABS:                        8.9    6.6   )-----------( 112      ( 04 Apr 2018 06:13 )             27.9     04-05    144  |  117<H>  |  60<H>  ----------------------------<  181<H>  4.1   |  16<L>  |  2.23<H>    Ca    8.0<L>      05 Apr 2018 07:12    TPro  5.5<L>  /  Alb  2.1<L>  /  TBili  0.5  /  DBili  x   /  AST  17  /  ALT  <6<L>  /  AlkPhos  80  04-04          Urine studies  Urea Nitrogen,  Random Urine: 519 mg/dL (04-03 @ 01:40)  Creatinine, Random Urine: 37 mg/dL (04-02 @ 15:34)  Sodium, Random Urine: 75 mmol/L (04-02 @ 15:34)  Potassium, Random Urine: 18 mmol/L <L> (04-02 @ 15:34)  Osmolality, Random Urine: 400 mos/kg (04-02 @ 15:33)    PTH and Vit D:

## 2018-04-05 NOTE — PROGRESS NOTE ADULT - PROBLEM SELECTOR PROBLEM 2
Stage 4 chronic kidney disease
CAD (coronary artery disease)

## 2018-04-05 NOTE — PROGRESS NOTE ADULT - ASSESSMENT
-s/p rt hip hemiarthroplasty  -acute on chronic renal insuff-pre renal per nephro-improving daily  -non anion gap acidosis 2/2 ckd  -nausea/vomiting  -hx; htn,dm,cad-cabg,hld  -e coli uti sensitive to rocephin    PLAN; abd xray           reglan/enema           nephro f/u           dvt ppx           phys tx           ortho f/u

## 2018-04-05 NOTE — PROGRESS NOTE ADULT - ATTENDING COMMENTS
please call with any questions, Dr. Floyd (cell: [phone])    NEPHROLOGY MEDICAL CARE, [facility]  MD Alejanrdo Mchugh MD Alexander Bangiev, MD Ljubisa Micic, MD    Answering Service: [phone]    Office: (Conrad Rodriguez MD)  [address] [place] [address]  Ph: [phone]  Fax: [phone]

## 2018-04-05 NOTE — PROGRESS NOTE ADULT - SUBJECTIVE AND OBJECTIVE BOX
ORTHO ATTENDING  POD#6  I saw and evaluated pt i bed awake and alert.  Reports no pain right hip at rest.  Has been ambulating in PT    PE: Right hip incision clean and dry and dressing changed.       Some pain on PRM of hip        No calf tenderness.    A/P: Right femoral neck fracture s/p hemiarthroplasty         Continue mobilization OOB to chair         PT ambulation WBAT         Discussed with pt.

## 2018-04-05 NOTE — PROGRESS NOTE ADULT - PROBLEM SELECTOR PLAN 8
Hypernatremia due to water deficit and insensible losses. Pt is clinically euvolemic.   -Na is better and continue 1/2 NS at 60cc/hr.    -Monitor I/O's. Check Serum Na Daily. Avoid high solute intake diet and sodium bicarbonate infuse. Avoid overcorrection of NA (8-10meq/day)

## 2018-04-05 NOTE — PROGRESS NOTE ADULT - SUBJECTIVE AND OBJECTIVE BOX
Patient is a 89y old  Male who presents with a chief complaint of s/p fall (04 Apr 2018 11:08)      INTERVAL HPI/OVERNIGHT EVENTS:  nausea and vomiting this am    VITAL SIGNS:  T(F): 98 (04-05-18 @ 05:57)  HR: 80 (04-05-18 @ 05:57)  BP: 157/59 (04-05-18 @ 05:57)  RR: 16 (04-05-18 @ 05:57)  SpO2: 100% (04-05-18 @ 05:57)  Wt(kg): --  I&O's Detail    04 Apr 2018 07:01  -  05 Apr 2018 07:00  --------------------------------------------------------  IN:  Total IN: 0 mL    OUT:    Indwelling Catheter - Urethral: 400 mL  Total OUT: 400 mL    Total NET: -400 mL              REVIEW OF SYSTEMS:    CONSTITUTIONAL:  No fevers, chills, sweats    HEENT:  Eyes:  No diplopia or blurred vision. ENT:  No earache, sore throat or runny nose.    CARDIOVASCULAR:  No pressure, squeezing, tightness, or heaviness about the chest; no palpitations.    RESPIRATORY:  Per HPI    GASTROINTESTINAL:  nausea/vomiting    GENITOURINARY:  No dysuria, frequency or urgency.    NEUROLOGIC:  No paresthesias, fasciculations, seizures or weakness.    PSYCHIATRIC:  No disorder of thought or mood.      PHYSICAL EXAM:    Constitutional:no complaints  ENMT:atnc  Neck:supple  Respiratory:clear  Cardiovascular:rr  Gastrointestinal:soft, non tender, bowel sounds present  Extremities:no edema  Vascular:intact  Neurological:non focal  Musculoskeletal:no edema, normal rom    MEDICATIONS  (STANDING):  atorvastatin 40 milliGRAM(s) Oral at bedtime  cefTRIAXone   IVPB 1 Gram(s) IV Intermittent every 24 hours  citalopram 20 milliGRAM(s) Oral daily  finasteride 5 milliGRAM(s) Oral daily  heparin  Injectable 5000 Unit(s) SubCutaneous every 12 hours  insulin glargine Injectable (LANTUS) 15 Unit(s) SubCutaneous every morning  insulin lispro (HumaLOG) corrective regimen sliding scale   SubCutaneous every 4 hours  isosorbide   mononitrate ER Tablet (IMDUR) 30 milliGRAM(s) Oral daily  metoprolol tartrate 25 milliGRAM(s) Oral two times a day  ranolazine 500 milliGRAM(s) Oral two times a day  sodium chloride 0.45%. 1000 milliLiter(s) (60 mL/Hr) IV Continuous <Continuous>  tamsulosin 0.4 milliGRAM(s) Oral at bedtime    MEDICATIONS  (PRN):  acetaminophen   Tablet 650 milliGRAM(s) Oral every 6 hours PRN For Temp over 38.3 C (100.94 F)  acetaminophen   Tablet. 650 milliGRAM(s) Oral every 6 hours PRN Mild Pain (1 - 3)  acetaminophen  IVPB. 1000 milliGRAM(s) IV Intermittent once PRN Moderate Pain (4 - 6)  benzocaine 15 mG/menthol 3.6 mG Lozenge 1 Lozenge Oral every 2 hours PRN Sore Throat  magnesium hydroxide Suspension 30 milliLiter(s) Oral daily PRN Constipation  ondansetron Injectable 4 milliGRAM(s) IV Push every 6 hours PRN Nausea and/or Vomiting      Allergies    No Known Allergies            LABS:                        8.9    6.6   )-----------( 112      ( 04 Apr 2018 06:13 )             27.9     04-05    144  |  117<H>  |  60<H>  ----------------------------<  181<H>  4.1   |  16<L>  |  2.23<H>    Ca    8.0<L>      05 Apr 2018 07:12    TPro  5.5<L>  /  Alb  2.1<L>  /  TBili  0.5  /  DBili  x   /  AST  17  /  ALT  <6<L>  /  AlkPhos  80  04-04              CAPILLARY BLOOD GLUCOSE      POCT Blood Glucose.: 186 mg/dL (05 Apr 2018 07:46)  POCT Blood Glucose.: 165 mg/dL (05 Apr 2018 05:34)  POCT Blood Glucose.: 143 mg/dL (05 Apr 2018 01:48)  POCT Blood Glucose.: 136 mg/dL (04 Apr 2018 21:57)  POCT Blood Glucose.: 168 mg/dL (04 Apr 2018 16:00)  POCT Blood Glucose.: 319 mg/dL (04 Apr 2018 14:09)  POCT Blood Glucose.: 257 mg/dL (04 Apr 2018 10:38)        Culture - Urine (03.30.18 @ 17:49)    -  Amikacin: S <=8    -  Amoxicillin/Clavulanic Acid: S <=8/4    -  Ampicillin: R >16 These ampicillin results predict results for amoxicillin    -  Ampicillin/Sulbactam: I 16/8    -  Aztreonam: S <=4    -  Cefazolin: S 8 This predicts results for oral agents cefaclor, cefdinir, cefpodoxime, cefprozil, cefuroxime axetil, cephalexin and locarbef for uncomplicated UTI. Note that some isolates may be susceptible to these agents while testing resistant to cefazolin.    -  Cefepime: S <=2    -  Cefoxitin: S 8    -  Ceftriaxone: S <=1 Enterobacter, Citrobacter, and Serratia may develop resistance during prolonged therapy    -  Ciprofloxacin: R >2    -  Ertapenem: S <=0.5    -  Gentamicin: S <=1    -  Imipenem: S <=1    -  Levofloxacin: R >4    -  Meropenem: S <=1    -  Nitrofurantoin: S <=32 Should not be used to treat pyelonephritis    -  Piperacillin/Tazobactam: S <=8    -  Tigecycline: S <=1    -  Tobramycin: S <=2    -  Trimethoprim/Sulfamethoxazole: S <=0.5/9.5    Specimen Source: .Urine Clean Catch (Midstream)    Culture Results:   >100,000 CFU/ml Escherichia coli    Organism Identification: Escherichia coli    Organism: Escherichia coli    Method Type: LISA

## 2018-04-06 VITALS
OXYGEN SATURATION: 100 % | SYSTOLIC BLOOD PRESSURE: 107 MMHG | DIASTOLIC BLOOD PRESSURE: 74 MMHG | HEART RATE: 84 BPM | RESPIRATION RATE: 16 BRPM | TEMPERATURE: 99 F

## 2018-04-06 LAB
ANION GAP SERPL CALC-SCNC: 14 MMOL/L — SIGNIFICANT CHANGE UP (ref 5–17)
BUN SERPL-MCNC: 64 MG/DL — HIGH (ref 7–18)
CALCIUM SERPL-MCNC: 7.7 MG/DL — LOW (ref 8.4–10.5)
CHLORIDE SERPL-SCNC: 111 MMOL/L — HIGH (ref 96–108)
CO2 SERPL-SCNC: 17 MMOL/L — LOW (ref 22–31)
CREAT SERPL-MCNC: 2.03 MG/DL — HIGH (ref 0.5–1.3)
GLUCOSE SERPL-MCNC: 144 MG/DL — HIGH (ref 70–99)
HCT VFR BLD CALC: 28.9 % — LOW (ref 39–50)
HGB BLD-MCNC: 9.2 G/DL — LOW (ref 13–17)
MCHC RBC-ENTMCNC: 29.6 PG — SIGNIFICANT CHANGE UP (ref 27–34)
MCHC RBC-ENTMCNC: 31.9 GM/DL — LOW (ref 32–36)
MCV RBC AUTO: 93 FL — SIGNIFICANT CHANGE UP (ref 80–100)
PLATELET # BLD AUTO: 125 K/UL — LOW (ref 150–400)
POTASSIUM SERPL-MCNC: 3.7 MMOL/L — SIGNIFICANT CHANGE UP (ref 3.5–5.3)
POTASSIUM SERPL-SCNC: 3.7 MMOL/L — SIGNIFICANT CHANGE UP (ref 3.5–5.3)
RBC # BLD: 3.11 M/UL — LOW (ref 4.2–5.8)
RBC # FLD: 13.8 % — SIGNIFICANT CHANGE UP (ref 10.3–14.5)
SODIUM SERPL-SCNC: 142 MMOL/L — SIGNIFICANT CHANGE UP (ref 135–145)
WBC # BLD: 7.4 K/UL — SIGNIFICANT CHANGE UP (ref 3.8–10.5)
WBC # FLD AUTO: 7.4 K/UL — SIGNIFICANT CHANGE UP (ref 3.8–10.5)

## 2018-04-06 PROCEDURE — 85730 THROMBOPLASTIN TIME PARTIAL: CPT

## 2018-04-06 PROCEDURE — 84133 ASSAY OF URINE POTASSIUM: CPT

## 2018-04-06 PROCEDURE — 83036 HEMOGLOBIN GLYCOSYLATED A1C: CPT

## 2018-04-06 PROCEDURE — 80053 COMPREHEN METABOLIC PANEL: CPT

## 2018-04-06 PROCEDURE — P9040: CPT

## 2018-04-06 PROCEDURE — C1776: CPT

## 2018-04-06 PROCEDURE — 85027 COMPLETE CBC AUTOMATED: CPT

## 2018-04-06 PROCEDURE — 83970 ASSAY OF PARATHORMONE: CPT

## 2018-04-06 PROCEDURE — 84100 ASSAY OF PHOSPHORUS: CPT

## 2018-04-06 PROCEDURE — 83935 ASSAY OF URINE OSMOLALITY: CPT

## 2018-04-06 PROCEDURE — 84443 ASSAY THYROID STIM HORMONE: CPT

## 2018-04-06 PROCEDURE — 86901 BLOOD TYPING SEROLOGIC RH(D): CPT

## 2018-04-06 PROCEDURE — 80048 BASIC METABOLIC PNL TOTAL CA: CPT

## 2018-04-06 PROCEDURE — 86923 COMPATIBILITY TEST ELECTRIC: CPT

## 2018-04-06 PROCEDURE — 99285 EMERGENCY DEPT VISIT HI MDM: CPT | Mod: 25

## 2018-04-06 PROCEDURE — 36430 TRANSFUSION BLD/BLD COMPNT: CPT

## 2018-04-06 PROCEDURE — 81001 URINALYSIS AUTO W/SCOPE: CPT

## 2018-04-06 PROCEDURE — 83690 ASSAY OF LIPASE: CPT

## 2018-04-06 PROCEDURE — 97116 GAIT TRAINING THERAPY: CPT

## 2018-04-06 PROCEDURE — 82570 ASSAY OF URINE CREATININE: CPT

## 2018-04-06 PROCEDURE — 82962 GLUCOSE BLOOD TEST: CPT

## 2018-04-06 PROCEDURE — 76775 US EXAM ABDO BACK WALL LIM: CPT

## 2018-04-06 PROCEDURE — 73501 X-RAY EXAM HIP UNI 1 VIEW: CPT

## 2018-04-06 PROCEDURE — 84540 ASSAY OF URINE/UREA-N: CPT

## 2018-04-06 PROCEDURE — 83735 ASSAY OF MAGNESIUM: CPT

## 2018-04-06 PROCEDURE — 82306 VITAMIN D 25 HYDROXY: CPT

## 2018-04-06 PROCEDURE — 71045 X-RAY EXAM CHEST 1 VIEW: CPT

## 2018-04-06 PROCEDURE — 86900 BLOOD TYPING SEROLOGIC ABO: CPT

## 2018-04-06 PROCEDURE — 97162 PT EVAL MOD COMPLEX 30 MIN: CPT

## 2018-04-06 PROCEDURE — 84466 ASSAY OF TRANSFERRIN: CPT

## 2018-04-06 PROCEDURE — 97530 THERAPEUTIC ACTIVITIES: CPT

## 2018-04-06 PROCEDURE — 93005 ELECTROCARDIOGRAM TRACING: CPT

## 2018-04-06 PROCEDURE — 96374 THER/PROPH/DIAG INJ IV PUSH: CPT

## 2018-04-06 PROCEDURE — C1889: CPT

## 2018-04-06 PROCEDURE — 84156 ASSAY OF PROTEIN URINE: CPT

## 2018-04-06 PROCEDURE — 82728 ASSAY OF FERRITIN: CPT

## 2018-04-06 PROCEDURE — 73502 X-RAY EXAM HIP UNI 2-3 VIEWS: CPT

## 2018-04-06 PROCEDURE — 85610 PROTHROMBIN TIME: CPT

## 2018-04-06 PROCEDURE — 87186 SC STD MICRODIL/AGAR DIL: CPT

## 2018-04-06 PROCEDURE — 83550 IRON BINDING TEST: CPT

## 2018-04-06 PROCEDURE — 84300 ASSAY OF URINE SODIUM: CPT

## 2018-04-06 PROCEDURE — 74019 RADEX ABDOMEN 2 VIEWS: CPT

## 2018-04-06 PROCEDURE — 99232 SBSQ HOSP IP/OBS MODERATE 35: CPT

## 2018-04-06 PROCEDURE — 97110 THERAPEUTIC EXERCISES: CPT

## 2018-04-06 PROCEDURE — 82607 VITAMIN B-12: CPT

## 2018-04-06 PROCEDURE — 86850 RBC ANTIBODY SCREEN: CPT

## 2018-04-06 PROCEDURE — 93306 TTE W/DOPPLER COMPLETE: CPT

## 2018-04-06 PROCEDURE — 82310 ASSAY OF CALCIUM: CPT

## 2018-04-06 PROCEDURE — 80061 LIPID PANEL: CPT

## 2018-04-06 PROCEDURE — 82746 ASSAY OF FOLIC ACID SERUM: CPT

## 2018-04-06 PROCEDURE — 74018 RADEX ABDOMEN 1 VIEW: CPT

## 2018-04-06 PROCEDURE — 87086 URINE CULTURE/COLONY COUNT: CPT

## 2018-04-06 RX ORDER — AZTREONAM 2 G
1 VIAL (EA) INJECTION
Qty: 10 | Refills: 0 | OUTPATIENT
Start: 2018-04-06 | End: 2018-04-10

## 2018-04-06 RX ORDER — INSULIN LISPRO 100/ML
VIAL (ML) SUBCUTANEOUS
Qty: 0 | Refills: 0 | Status: DISCONTINUED | OUTPATIENT
Start: 2018-04-06 | End: 2018-04-06

## 2018-04-06 RX ORDER — SENNA PLUS 8.6 MG/1
2 TABLET ORAL AT BEDTIME
Qty: 0 | Refills: 0 | Status: DISCONTINUED | OUTPATIENT
Start: 2018-04-06 | End: 2018-04-06

## 2018-04-06 RX ADMIN — Medication 10 MILLIGRAM(S): at 05:47

## 2018-04-06 RX ADMIN — HEPARIN SODIUM 5000 UNIT(S): 5000 INJECTION INTRAVENOUS; SUBCUTANEOUS at 05:47

## 2018-04-06 RX ADMIN — Medication 10 MILLIGRAM(S): at 13:10

## 2018-04-06 RX ADMIN — Medication 25 MILLIGRAM(S): at 05:47

## 2018-04-06 RX ADMIN — CEFTRIAXONE 100 GRAM(S): 500 INJECTION, POWDER, FOR SOLUTION INTRAMUSCULAR; INTRAVENOUS at 05:47

## 2018-04-06 RX ADMIN — FINASTERIDE 5 MILLIGRAM(S): 5 TABLET, FILM COATED ORAL at 13:09

## 2018-04-06 RX ADMIN — POLYETHYLENE GLYCOL 3350 17 GRAM(S): 17 POWDER, FOR SOLUTION ORAL at 13:09

## 2018-04-06 RX ADMIN — ISOSORBIDE MONONITRATE 30 MILLIGRAM(S): 60 TABLET, EXTENDED RELEASE ORAL at 13:09

## 2018-04-06 RX ADMIN — CITALOPRAM 20 MILLIGRAM(S): 10 TABLET, FILM COATED ORAL at 13:09

## 2018-04-06 RX ADMIN — RANOLAZINE 500 MILLIGRAM(S): 500 TABLET, FILM COATED, EXTENDED RELEASE ORAL at 05:47

## 2018-04-06 RX ADMIN — Medication 3 UNIT(S): at 12:04

## 2018-04-06 RX ADMIN — Medication 3 UNIT(S): at 08:25

## 2018-04-06 RX ADMIN — INSULIN GLARGINE 15 UNIT(S): 100 INJECTION, SOLUTION SUBCUTANEOUS at 08:25

## 2018-04-06 NOTE — PROGRESS NOTE ADULT - ASSESSMENT
-s/p rt hip hemiarthroplasty  -acute on chronic renal insuff-pre renal per nephro-improving daily  -non anion gap acidosis 2/2 ckd  -nausea/vomiting-resolved  -hx; htn,dm,cad-cabg,hld  -e coli uti sensitive to rocephin/bactrim    PLAN;                       nephro f/u           dvt ppx           phys tx           ortho f/u           discharge plan; can finish course of antibx with bactrim

## 2018-04-06 NOTE — PROGRESS NOTE ADULT - SUBJECTIVE AND OBJECTIVE BOX
Patient is a 89y old  Male who presents with a chief complaint of s/p fall (04 Apr 2018 11:08)      INTERVAL HPI/OVERNIGHT EVENTS:  no further nausea/vomiting    VITAL SIGNS:  T(F): 98.5 (04-06-18 @ 05:45)  HR: 70 (04-06-18 @ 05:45)  BP: 160/60 (04-06-18 @ 05:45)  RR: 16 (04-06-18 @ 05:45)  SpO2: 100% (04-06-18 @ 05:45)  Wt(kg): --  I&O's Detail          REVIEW OF SYSTEMS:    CONSTITUTIONAL:  No fevers, chills, sweats    HEENT:  Eyes:  No diplopia or blurred vision. ENT:  No earache, sore throat or runny nose.    CARDIOVASCULAR:  No pressure, squeezing, tightness, or heaviness about the chest; no palpitations.    RESPIRATORY:  Per HPI    GASTROINTESTINAL:  No abdominal pain, nausea, vomiting or diarrhea.    GENITOURINARY:  No dysuria, frequency or urgency.    NEUROLOGIC:  No paresthesias, fasciculations, seizures or weakness.    PSYCHIATRIC:  No disorder of thought or mood.      PHYSICAL EXAM:    Constitutional:no complaints  ENMT:atnc  Neck:supple  Respiratory:clear  Cardiovascular:rr  Gastrointestinal:soft  Extremities:no edema, rt hip clean  Vascular:intact  Neurological:non focal  Musculoskeletal:no edema, normal rom    MEDICATIONS  (STANDING):  atorvastatin 40 milliGRAM(s) Oral at bedtime  cefTRIAXone   IVPB 1 Gram(s) IV Intermittent every 24 hours  citalopram 20 milliGRAM(s) Oral daily  finasteride 5 milliGRAM(s) Oral daily  heparin  Injectable 5000 Unit(s) SubCutaneous every 12 hours  insulin glargine Injectable (LANTUS) 15 Unit(s) SubCutaneous every morning  insulin lispro (HumaLOG) corrective regimen sliding scale   SubCutaneous Before meals and at bedtime  insulin lispro Injectable (HumaLOG) 3 Unit(s) SubCutaneous three times a day before meals  isosorbide   mononitrate ER Tablet (IMDUR) 30 milliGRAM(s) Oral daily  metoclopramide 10 milliGRAM(s) Oral three times a day  metoprolol tartrate 25 milliGRAM(s) Oral two times a day  polyethylene glycol 3350 17 Gram(s) Oral daily  ranolazine 500 milliGRAM(s) Oral two times a day  sodium chloride 0.45%. 1000 milliLiter(s) (60 mL/Hr) IV Continuous <Continuous>  tamsulosin 0.4 milliGRAM(s) Oral at bedtime    MEDICATIONS  (PRN):  acetaminophen   Tablet 650 milliGRAM(s) Oral every 6 hours PRN For Temp over 38.3 C (100.94 F)  acetaminophen   Tablet. 650 milliGRAM(s) Oral every 6 hours PRN Mild Pain (1 - 3)  acetaminophen  IVPB. 1000 milliGRAM(s) IV Intermittent once PRN Moderate Pain (4 - 6)  benzocaine 15 mG/menthol 3.6 mG Lozenge 1 Lozenge Oral every 2 hours PRN Sore Throat  magnesium hydroxide Suspension 30 milliLiter(s) Oral daily PRN Constipation  ondansetron Injectable 4 milliGRAM(s) IV Push every 6 hours PRN Nausea and/or Vomiting      Allergies    No Known Allergies            LABS:                        9.2    7.4   )-----------( 125      ( 06 Apr 2018 06:50 )             28.9     04-06    142  |  111<H>  |  64<H>  ----------------------------<  144<H>  3.7   |  17<L>  |  2.03<H>    Ca    7.7<L>      06 Apr 2018 06:50                CAPILLARY BLOOD GLUCOSE      POCT Blood Glucose.: 146 mg/dL (06 Apr 2018 08:22)  POCT Blood Glucose.: 142 mg/dL (06 Apr 2018 05:56)  POCT Blood Glucose.: 146 mg/dL (06 Apr 2018 02:09)  POCT Blood Glucose.: 133 mg/dL (05 Apr 2018 21:12)  POCT Blood Glucose.: 177 mg/dL (05 Apr 2018 17:52)  POCT Blood Glucose.: 204 mg/dL (05 Apr 2018 15:47)  POCT Blood Glucose.: 191 mg/dL (05 Apr 2018 12:55)  POCT Blood Glucose.: 207 mg/dL (05 Apr 2018 10:23)        RADIOLOGY & ADDITIONAL TESTS:  EXAM:  XR ABDOMEN 2V                            PROCEDURE DATE:  04/05/2018          INTERPRETATION:  Abdominal x-rays    Indication: Vomiting.    Supine and erect AP views of the abdomen are compared to a previous   examination dated 4/3/2018.    Impression: Previously noted NG tube has been removed.    No evidence for bowel obstruction.     Previously noted gaseous dilatation of the colon has decompressed.    Moderate amount of stool in the right colon.    No evidence for free air in the abdomen.    Suture lines in the left lower quadrant abdomen.

## 2018-04-06 NOTE — PROGRESS NOTE ADULT - SUBJECTIVE AND OBJECTIVE BOX
CHIEF COMPLAINT:Patient is a 89y old  Male who presents with a chief complaint of s/p fall. Pt appears comfortable.    	  REVIEW OF SYSTEMS:  CONSTITUTIONAL: No fever, weight loss, or fatigue  EYES: No eye pain, visual disturbances, or discharge  ENT:  No difficulty hearing, tinnitus, vertigo; No sinus or throat pain  NECK: No pain or stiffness  RESPIRATORY: No cough, wheezing, chills or hemoptysis; No Shortness of Breath  CARDIOVASCULAR: No chest pain, palpitations, passing out, dizziness, or leg swelling  GASTROINTESTINAL: No abdominal or epigastric pain. No nausea, vomiting, or hematemesis; No diarrhea or constipation. No melena or hematochezia.  GENITOURINARY: No dysuria, frequency, hematuria, or incontinence  NEUROLOGICAL: No headaches, memory loss, loss of strength, numbness, or tremors  SKIN: No itching, burning, rashes, or lesions   LYMPH Nodes: No enlarged glands  ENDOCRINE: No heat or cold intolerance; No hair loss  MUSCULOSKELETAL: No joint pain or swelling; No muscle, back, or extremity pain  PSYCHIATRIC: No depression, anxiety, mood swings, or difficulty sleeping  HEME/LYMPH: No easy bruising, or bleeding gums  ALLERGY AND IMMUNOLOGIC: No hives or eczema	    PHYSICAL EXAM:  T(C): 36.9 (04-06-18 @ 05:45), Max: 36.9 (04-06-18 @ 05:45)  HR: 70 (04-06-18 @ 05:45) (70 - 91)  BP: 160/60 (04-06-18 @ 05:45) (130/60 - 160/60)  RR: 16 (04-06-18 @ 05:45) (16 - 18)  SpO2: 100% (04-06-18 @ 05:45) (99% - 100%)  Wt(kg): --  I&O's Summary      Appearance: Normal	  HEENT:   Normal oral mucosa, PERRL, EOMI	  Lymphatic: No lymphadenopathy  Cardiovascular: Normal S1 S2, No JVD, No murmurs, No edema  Respiratory: Lungs clear to auscultation	  Psychiatry: A & O x 3, Mood & affect appropriate  Gastrointestinal:  Soft, Non-tender, + BS	  Skin: No rashes, No ecchymoses, No cyanosis	  Neurologic: Non-focal  Extremities: Normal range of motion, No clubbing, cyanosis or edema  Vascular: Peripheral pulses palpable 2+ bilaterally    MEDICATIONS  (STANDING):  atorvastatin 40 milliGRAM(s) Oral at bedtime  cefTRIAXone   IVPB 1 Gram(s) IV Intermittent every 24 hours  citalopram 20 milliGRAM(s) Oral daily  finasteride 5 milliGRAM(s) Oral daily  heparin  Injectable 5000 Unit(s) SubCutaneous every 12 hours  insulin glargine Injectable (LANTUS) 15 Unit(s) SubCutaneous every morning  insulin lispro (HumaLOG) corrective regimen sliding scale   SubCutaneous Before meals and at bedtime  insulin lispro Injectable (HumaLOG) 3 Unit(s) SubCutaneous three times a day before meals  isosorbide   mononitrate ER Tablet (IMDUR) 30 milliGRAM(s) Oral daily  metoclopramide 10 milliGRAM(s) Oral three times a day  metoprolol tartrate 25 milliGRAM(s) Oral two times a day  polyethylene glycol 3350 17 Gram(s) Oral daily  ranolazine 500 milliGRAM(s) Oral two times a day  senna 2 Tablet(s) Oral at bedtime  tamsulosin 0.4 milliGRAM(s) Oral at bedtime        	  LABS:	 	                        9.2    7.4   )-----------( 125      ( 06 Apr 2018 06:50 )             28.9     04-06    142  |  111<H>  |  64<H>  ----------------------------<  144<H>  3.7   |  17<L>  |  2.03<H>    Ca    7.7<L>      06 Apr 2018 06:50    Lipid Profile: Cholesterol 120  LDL 39  HDL 55      HgA1c: Hemoglobin A1C, Whole Blood: 5.6 % (04-02 @ 10:20)    TSH: Thyroid Stimulating Hormone, Serum: 2.77 uU/mL (03-30 @ 07:26)

## 2018-04-06 NOTE — PROGRESS NOTE ADULT - SUBJECTIVE AND OBJECTIVE BOX
pt much improved. Seen in the morning  Denies abdominal pain  ICU Vital Signs Last 24 Hrs  T(C): 37 (06 Apr 2018 14:32), Max: 37 (06 Apr 2018 14:32)  T(F): 98.6 (06 Apr 2018 14:32), Max: 98.6 (06 Apr 2018 14:32)  HR: 84 (06 Apr 2018 14:32) (70 - 84)  BP: 107/74 (06 Apr 2018 14:32) (107/74 - 160/60)  BP(mean): --  ABP: --  ABP(mean): --  RR: 16 (06 Apr 2018 14:32) (16 - 18)  SpO2: 100% (06 Apr 2018 14:32) (100% - 100%)                          9.2    7.4   )-----------( 125      ( 06 Apr 2018 06:50 )             28.9   04-06    142  |  111<H>  |  64<H>  ----------------------------<  144<H>  3.7   |  17<L>  |  2.03<H>    Ca    7.7<L>      06 Apr 2018 06:50    Abdomen soft, non tender  Having bms, on reg diet

## 2018-04-06 NOTE — PROGRESS NOTE ADULT - PROVIDER SPECIALTY LIST ADULT
Cardiology
Diabetes
Internal Medicine
Nephrology
Orthopedics
Surgery
Cardiology
Diabetes

## 2018-04-06 NOTE — PROGRESS NOTE ADULT - ASSESSMENT
89 yr old male with PMHX of CAD,HTN,Lipid D/O,Anemia,CRI,DM who presents s/p fall with RT hip pain.  1.PT.  2.HTN and CAD-asa,lopressor.  3.Anemia- Iron.  4.CRI-renal f/u, D/C IVF.  5.DM-Insulin.  6.Pain control.  7.GI and DVT prophylaxis.

## 2018-04-15 ENCOUNTER — INPATIENT (INPATIENT)
Facility: HOSPITAL | Age: 83
LOS: 4 days | Discharge: HOME | DRG: 208 | End: 2018-04-20
Attending: INTERNAL MEDICINE | Admitting: INTERNAL MEDICINE
Payer: MEDICARE

## 2018-04-15 VITALS
WEIGHT: 160.06 LBS | SYSTOLIC BLOOD PRESSURE: 155 MMHG | HEART RATE: 81 BPM | HEIGHT: 67 IN | OXYGEN SATURATION: 100 % | TEMPERATURE: 99 F | RESPIRATION RATE: 16 BRPM | DIASTOLIC BLOOD PRESSURE: 80 MMHG

## 2018-04-15 DIAGNOSIS — I25.810 ATHEROSCLEROSIS OF CORONARY ARTERY BYPASS GRAFT(S) WITHOUT ANGINA PECTORIS: ICD-10-CM

## 2018-04-15 DIAGNOSIS — J15.9 UNSPECIFIED BACTERIAL PNEUMONIA: ICD-10-CM

## 2018-04-15 DIAGNOSIS — J96.02 ACUTE RESPIRATORY FAILURE WITH HYPERCAPNIA: ICD-10-CM

## 2018-04-15 DIAGNOSIS — Z29.9 ENCOUNTER FOR PROPHYLACTIC MEASURES, UNSPECIFIED: ICD-10-CM

## 2018-04-15 DIAGNOSIS — Z95.1 PRESENCE OF AORTOCORONARY BYPASS GRAFT: Chronic | ICD-10-CM

## 2018-04-15 DIAGNOSIS — S72.001G FRACTURE OF UNSPECIFIED PART OF NECK OF RIGHT FEMUR, SUBSEQUENT ENCOUNTER FOR CLOSED FRACTURE WITH DELAYED HEALING: ICD-10-CM

## 2018-04-15 DIAGNOSIS — Z98.89 OTHER SPECIFIED POSTPROCEDURAL STATES: Chronic | ICD-10-CM

## 2018-04-15 DIAGNOSIS — I10 ESSENTIAL (PRIMARY) HYPERTENSION: ICD-10-CM

## 2018-04-15 DIAGNOSIS — J96.01 ACUTE RESPIRATORY FAILURE WITH HYPOXIA: ICD-10-CM

## 2018-04-15 DIAGNOSIS — E11.22 TYPE 2 DIABETES MELLITUS WITH DIABETIC CHRONIC KIDNEY DISEASE: ICD-10-CM

## 2018-04-15 DIAGNOSIS — T81.4XXS INFECTION FOLLOWING A PROCEDURE, SEQUELA: ICD-10-CM

## 2018-04-15 DIAGNOSIS — N17.9 ACUTE KIDNEY FAILURE, UNSPECIFIED: ICD-10-CM

## 2018-04-15 LAB
ALBUMIN SERPL ELPH-MCNC: 2.4 G/DL — LOW (ref 3.5–5)
ALP SERPL-CCNC: 96 U/L — SIGNIFICANT CHANGE UP (ref 40–120)
ALT FLD-CCNC: 15 U/L DA — SIGNIFICANT CHANGE UP (ref 10–60)
ANION GAP SERPL CALC-SCNC: 11 MMOL/L — SIGNIFICANT CHANGE UP (ref 5–17)
APPEARANCE UR: CLEAR — SIGNIFICANT CHANGE UP
AST SERPL-CCNC: 26 U/L — SIGNIFICANT CHANGE UP (ref 10–40)
BASE EXCESS BLDA CALC-SCNC: 1.1 MMOL/L — SIGNIFICANT CHANGE UP (ref -2–2)
BASE EXCESS BLDV CALC-SCNC: -6.4 MMOL/L — LOW (ref -2–2)
BASOPHILS # BLD AUTO: 0.1 K/UL — SIGNIFICANT CHANGE UP (ref 0–0.2)
BASOPHILS NFR BLD AUTO: 0.9 % — SIGNIFICANT CHANGE UP (ref 0–2)
BILIRUB SERPL-MCNC: 0.3 MG/DL — SIGNIFICANT CHANGE UP (ref 0.2–1.2)
BILIRUB UR-MCNC: NEGATIVE — SIGNIFICANT CHANGE UP
BLOOD GAS COMMENTS ARTERIAL: SIGNIFICANT CHANGE UP
BLOOD GAS COMMENTS, VENOUS: SIGNIFICANT CHANGE UP
BUN SERPL-MCNC: 32 MG/DL — HIGH (ref 7–18)
CALCIUM SERPL-MCNC: 7.6 MG/DL — LOW (ref 8.4–10.5)
CHLORIDE SERPL-SCNC: 99 MMOL/L — SIGNIFICANT CHANGE UP (ref 96–108)
CK MB BLD-MCNC: 3 % — SIGNIFICANT CHANGE UP (ref 0–3.5)
CK MB BLD-MCNC: 3.1 % — SIGNIFICANT CHANGE UP (ref 0–3.5)
CK MB CFR SERPL CALC: 3.2 NG/ML — SIGNIFICANT CHANGE UP (ref 0–3.6)
CK MB CFR SERPL CALC: 3.4 NG/ML — SIGNIFICANT CHANGE UP (ref 0–3.6)
CK SERPL-CCNC: 102 U/L — SIGNIFICANT CHANGE UP (ref 35–232)
CK SERPL-CCNC: 115 U/L — SIGNIFICANT CHANGE UP (ref 35–232)
CO2 SERPL-SCNC: 25 MMOL/L — SIGNIFICANT CHANGE UP (ref 22–31)
COLOR SPEC: YELLOW — SIGNIFICANT CHANGE UP
CREAT SERPL-MCNC: 2.91 MG/DL — HIGH (ref 0.5–1.3)
DIFF PNL FLD: ABNORMAL
EOSINOPHIL # BLD AUTO: 0.1 K/UL — SIGNIFICANT CHANGE UP (ref 0–0.5)
EOSINOPHIL NFR BLD AUTO: 0.9 % — SIGNIFICANT CHANGE UP (ref 0–6)
GLUCOSE BLDC GLUCOMTR-MCNC: 114 MG/DL — HIGH (ref 70–99)
GLUCOSE BLDC GLUCOMTR-MCNC: 225 MG/DL — HIGH (ref 70–99)
GLUCOSE BLDC GLUCOMTR-MCNC: 227 MG/DL — HIGH (ref 70–99)
GLUCOSE BLDC GLUCOMTR-MCNC: 235 MG/DL — HIGH (ref 70–99)
GLUCOSE BLDC GLUCOMTR-MCNC: 288 MG/DL — HIGH (ref 70–99)
GLUCOSE BLDC GLUCOMTR-MCNC: 51 MG/DL — LOW (ref 70–99)
GLUCOSE BLDC GLUCOMTR-MCNC: 67 MG/DL — LOW (ref 70–99)
GLUCOSE SERPL-MCNC: 96 MG/DL — SIGNIFICANT CHANGE UP (ref 70–99)
GLUCOSE UR QL: NEGATIVE — SIGNIFICANT CHANGE UP
HCO3 BLDA-SCNC: 24 MMOL/L — SIGNIFICANT CHANGE UP (ref 23–27)
HCO3 BLDV-SCNC: 23 MMOL/L — SIGNIFICANT CHANGE UP (ref 21–29)
HCT VFR BLD CALC: 27.5 % — LOW (ref 39–50)
HGB BLD-MCNC: 9.6 G/DL — LOW (ref 13–17)
HOROWITZ INDEX BLDA+IHG-RTO: 100 — SIGNIFICANT CHANGE UP
HOROWITZ INDEX BLDV+IHG-RTO: 21 — SIGNIFICANT CHANGE UP
KETONES UR-MCNC: NEGATIVE — SIGNIFICANT CHANGE UP
LEUKOCYTE ESTERASE UR-ACNC: ABNORMAL
LYMPHOCYTES # BLD AUTO: 1.4 K/UL — SIGNIFICANT CHANGE UP (ref 1–3.3)
LYMPHOCYTES # BLD AUTO: 14.6 % — SIGNIFICANT CHANGE UP (ref 13–44)
MCHC RBC-ENTMCNC: 32.2 PG — SIGNIFICANT CHANGE UP (ref 27–34)
MCHC RBC-ENTMCNC: 34.8 GM/DL — SIGNIFICANT CHANGE UP (ref 32–36)
MCV RBC AUTO: 92.5 FL — SIGNIFICANT CHANGE UP (ref 80–100)
MONOCYTES # BLD AUTO: 0.6 K/UL — SIGNIFICANT CHANGE UP (ref 0–0.9)
MONOCYTES NFR BLD AUTO: 6.5 % — SIGNIFICANT CHANGE UP (ref 2–14)
NEUTROPHILS # BLD AUTO: 7.1 K/UL — SIGNIFICANT CHANGE UP (ref 1.8–7.4)
NEUTROPHILS NFR BLD AUTO: 77 % — SIGNIFICANT CHANGE UP (ref 43–77)
NITRITE UR-MCNC: NEGATIVE — SIGNIFICANT CHANGE UP
PCO2 BLDA: 34 MMHG — SIGNIFICANT CHANGE UP (ref 32–46)
PCO2 BLDV: 71 MMHG — HIGH (ref 35–50)
PH BLDA: 7.47 — HIGH (ref 7.35–7.45)
PH BLDV: 7.13 — CRITICAL LOW (ref 7.35–7.45)
PH UR: 7 — SIGNIFICANT CHANGE UP (ref 5–8)
PLATELET # BLD AUTO: 239 K/UL — SIGNIFICANT CHANGE UP (ref 150–400)
PO2 BLDA: 192 MMHG — HIGH (ref 74–108)
PO2 BLDV: <44 MMHG — SIGNIFICANT CHANGE UP (ref 25–45)
POTASSIUM SERPL-MCNC: 4.3 MMOL/L — SIGNIFICANT CHANGE UP (ref 3.5–5.3)
POTASSIUM SERPL-SCNC: 4.3 MMOL/L — SIGNIFICANT CHANGE UP (ref 3.5–5.3)
PROT SERPL-MCNC: 5.6 G/DL — LOW (ref 6–8.3)
PROT UR-MCNC: 100
RBC # BLD: 2.97 M/UL — LOW (ref 4.2–5.8)
RBC # FLD: 13.8 % — SIGNIFICANT CHANGE UP (ref 10.3–14.5)
SAO2 % BLDA: >99 % — SIGNIFICANT CHANGE UP (ref 92–96)
SAO2 % BLDV: 52 % — LOW (ref 67–88)
SODIUM SERPL-SCNC: 135 MMOL/L — SIGNIFICANT CHANGE UP (ref 135–145)
SP GR SPEC: 1 — LOW (ref 1.01–1.02)
TROPONIN I SERPL-MCNC: 0.12 NG/ML — HIGH (ref 0–0.04)
TROPONIN I SERPL-MCNC: 0.18 NG/ML — HIGH (ref 0–0.04)
UROBILINOGEN FLD QL: 4
WBC # BLD: 9.3 K/UL — SIGNIFICANT CHANGE UP (ref 3.8–10.5)
WBC # FLD AUTO: 9.3 K/UL — SIGNIFICANT CHANGE UP (ref 3.8–10.5)

## 2018-04-15 PROCEDURE — 99291 CRITICAL CARE FIRST HOUR: CPT

## 2018-04-15 PROCEDURE — 71045 X-RAY EXAM CHEST 1 VIEW: CPT | Mod: 26

## 2018-04-15 PROCEDURE — 72170 X-RAY EXAM OF PELVIS: CPT | Mod: 26

## 2018-04-15 PROCEDURE — 70450 CT HEAD/BRAIN W/O DYE: CPT | Mod: 26

## 2018-04-15 PROCEDURE — 71045 X-RAY EXAM CHEST 1 VIEW: CPT | Mod: 26,77

## 2018-04-15 RX ORDER — INSULIN LISPRO 100/ML
VIAL (ML) SUBCUTANEOUS EVERY 6 HOURS
Qty: 0 | Refills: 0 | Status: DISCONTINUED | OUTPATIENT
Start: 2018-04-15 | End: 2018-04-16

## 2018-04-15 RX ORDER — ISOSORBIDE MONONITRATE 60 MG/1
30 TABLET, EXTENDED RELEASE ORAL DAILY
Qty: 0 | Refills: 0 | Status: DISCONTINUED | OUTPATIENT
Start: 2018-04-15 | End: 2018-04-20

## 2018-04-15 RX ORDER — VANCOMYCIN HCL 1 G
1000 VIAL (EA) INTRAVENOUS ONCE
Qty: 0 | Refills: 0 | Status: COMPLETED | OUTPATIENT
Start: 2018-04-15 | End: 2018-04-15

## 2018-04-15 RX ORDER — FUROSEMIDE 40 MG
60 TABLET ORAL ONCE
Qty: 0 | Refills: 0 | Status: COMPLETED | OUTPATIENT
Start: 2018-04-15 | End: 2018-04-15

## 2018-04-15 RX ORDER — CEFEPIME 1 G/1
INJECTION, POWDER, FOR SOLUTION INTRAMUSCULAR; INTRAVENOUS
Qty: 0 | Refills: 0 | Status: DISCONTINUED | OUTPATIENT
Start: 2018-04-15 | End: 2018-04-20

## 2018-04-15 RX ORDER — PANTOPRAZOLE SODIUM 20 MG/1
40 TABLET, DELAYED RELEASE ORAL DAILY
Qty: 0 | Refills: 0 | Status: DISCONTINUED | OUTPATIENT
Start: 2018-04-15 | End: 2018-04-20

## 2018-04-15 RX ORDER — CEFEPIME 1 G/1
1000 INJECTION, POWDER, FOR SOLUTION INTRAMUSCULAR; INTRAVENOUS EVERY 24 HOURS
Qty: 0 | Refills: 0 | Status: DISCONTINUED | OUTPATIENT
Start: 2018-04-16 | End: 2018-04-20

## 2018-04-15 RX ORDER — TAMSULOSIN HYDROCHLORIDE 0.4 MG/1
0.4 CAPSULE ORAL AT BEDTIME
Qty: 0 | Refills: 0 | Status: DISCONTINUED | OUTPATIENT
Start: 2018-04-15 | End: 2018-04-15

## 2018-04-15 RX ORDER — HYDROMORPHONE HYDROCHLORIDE 2 MG/ML
1 INJECTION INTRAMUSCULAR; INTRAVENOUS; SUBCUTANEOUS ONCE
Qty: 0 | Refills: 0 | Status: DISCONTINUED | OUTPATIENT
Start: 2018-04-15 | End: 2018-04-15

## 2018-04-15 RX ORDER — ASPIRIN/CALCIUM CARB/MAGNESIUM 324 MG
81 TABLET ORAL DAILY
Qty: 0 | Refills: 0 | Status: DISCONTINUED | OUTPATIENT
Start: 2018-04-15 | End: 2018-04-20

## 2018-04-15 RX ORDER — DEXTROSE 50 % IN WATER 50 %
SYRINGE (ML) INTRAVENOUS
Qty: 0 | Refills: 0 | Status: DISCONTINUED | OUTPATIENT
Start: 2018-04-15 | End: 2018-04-15

## 2018-04-15 RX ORDER — DEXTROSE 50 % IN WATER 50 %
50 SYRINGE (ML) INTRAVENOUS ONCE
Qty: 0 | Refills: 0 | Status: COMPLETED | OUTPATIENT
Start: 2018-04-15 | End: 2018-04-15

## 2018-04-15 RX ORDER — ETOMIDATE 2 MG/ML
20 INJECTION INTRAVENOUS ONCE
Qty: 0 | Refills: 0 | Status: COMPLETED | OUTPATIENT
Start: 2018-04-15 | End: 2018-04-15

## 2018-04-15 RX ORDER — SUCCINYLCHOLINE CHLORIDE 100 MG/5ML
100 SYRINGE (ML) INTRAVENOUS ONCE
Qty: 0 | Refills: 0 | Status: COMPLETED | OUTPATIENT
Start: 2018-04-15 | End: 2018-04-15

## 2018-04-15 RX ORDER — HYDROMORPHONE HYDROCHLORIDE 2 MG/ML
1 INJECTION INTRAMUSCULAR; INTRAVENOUS; SUBCUTANEOUS EVERY 4 HOURS
Qty: 0 | Refills: 0 | Status: DISCONTINUED | OUTPATIENT
Start: 2018-04-15 | End: 2018-04-20

## 2018-04-15 RX ORDER — CITALOPRAM 10 MG/1
20 TABLET, FILM COATED ORAL DAILY
Qty: 0 | Refills: 0 | Status: DISCONTINUED | OUTPATIENT
Start: 2018-04-15 | End: 2018-04-20

## 2018-04-15 RX ORDER — HEPARIN SODIUM 5000 [USP'U]/ML
5000 INJECTION INTRAVENOUS; SUBCUTANEOUS EVERY 8 HOURS
Qty: 0 | Refills: 0 | Status: DISCONTINUED | OUTPATIENT
Start: 2018-04-15 | End: 2018-04-20

## 2018-04-15 RX ORDER — DEXTROSE 50 % IN WATER 50 %
50 SYRINGE (ML) INTRAVENOUS ONCE
Qty: 0 | Refills: 0 | Status: DISCONTINUED | OUTPATIENT
Start: 2018-04-15 | End: 2018-04-15

## 2018-04-15 RX ORDER — CEFEPIME 1 G/1
1000 INJECTION, POWDER, FOR SOLUTION INTRAMUSCULAR; INTRAVENOUS ONCE
Qty: 0 | Refills: 0 | Status: COMPLETED | OUTPATIENT
Start: 2018-04-15 | End: 2018-04-15

## 2018-04-15 RX ORDER — ATENOLOL 25 MG/1
25 TABLET ORAL DAILY
Qty: 0 | Refills: 0 | Status: DISCONTINUED | OUTPATIENT
Start: 2018-04-15 | End: 2018-04-20

## 2018-04-15 RX ORDER — TAMSULOSIN HYDROCHLORIDE 0.4 MG/1
0.4 CAPSULE ORAL AT BEDTIME
Qty: 0 | Refills: 0 | Status: DISCONTINUED | OUTPATIENT
Start: 2018-04-15 | End: 2018-04-20

## 2018-04-15 RX ORDER — AMLODIPINE BESYLATE 2.5 MG/1
10 TABLET ORAL DAILY
Qty: 0 | Refills: 0 | Status: DISCONTINUED | OUTPATIENT
Start: 2018-04-15 | End: 2018-04-15

## 2018-04-15 RX ORDER — FINASTERIDE 5 MG/1
5 TABLET, FILM COATED ORAL DAILY
Qty: 0 | Refills: 0 | Status: DISCONTINUED | OUTPATIENT
Start: 2018-04-15 | End: 2018-04-20

## 2018-04-15 RX ORDER — CLOPIDOGREL BISULFATE 75 MG/1
75 TABLET, FILM COATED ORAL DAILY
Qty: 0 | Refills: 0 | Status: DISCONTINUED | OUTPATIENT
Start: 2018-04-15 | End: 2018-04-20

## 2018-04-15 RX ORDER — ISOSORBIDE MONONITRATE 60 MG/1
30 TABLET, EXTENDED RELEASE ORAL DAILY
Qty: 0 | Refills: 0 | Status: DISCONTINUED | OUTPATIENT
Start: 2018-04-15 | End: 2018-04-15

## 2018-04-15 RX ORDER — ATENOLOL 25 MG/1
25 TABLET ORAL DAILY
Qty: 0 | Refills: 0 | Status: DISCONTINUED | OUTPATIENT
Start: 2018-04-15 | End: 2018-04-15

## 2018-04-15 RX ORDER — ATORVASTATIN CALCIUM 80 MG/1
40 TABLET, FILM COATED ORAL AT BEDTIME
Qty: 0 | Refills: 0 | Status: DISCONTINUED | OUTPATIENT
Start: 2018-04-15 | End: 2018-04-20

## 2018-04-15 RX ORDER — INSULIN GLARGINE 100 [IU]/ML
10 INJECTION, SOLUTION SUBCUTANEOUS AT BEDTIME
Qty: 0 | Refills: 0 | Status: DISCONTINUED | OUTPATIENT
Start: 2018-04-15 | End: 2018-04-15

## 2018-04-15 RX ORDER — COLLAGENASE CLOSTRIDIUM HIST. 250 UNIT/G
1 OINTMENT (GRAM) TOPICAL DAILY
Qty: 0 | Refills: 0 | Status: DISCONTINUED | OUTPATIENT
Start: 2018-04-15 | End: 2018-04-20

## 2018-04-15 RX ORDER — PROPOFOL 10 MG/ML
5 INJECTION, EMULSION INTRAVENOUS
Qty: 500 | Refills: 0 | Status: DISCONTINUED | OUTPATIENT
Start: 2018-04-15 | End: 2018-04-16

## 2018-04-15 RX ADMIN — HYDROMORPHONE HYDROCHLORIDE 1 MILLIGRAM(S): 2 INJECTION INTRAMUSCULAR; INTRAVENOUS; SUBCUTANEOUS at 20:21

## 2018-04-15 RX ADMIN — Medication 50 MILLILITER(S): at 18:58

## 2018-04-15 RX ADMIN — CEFEPIME 100 MILLIGRAM(S): 1 INJECTION, POWDER, FOR SOLUTION INTRAMUSCULAR; INTRAVENOUS at 08:01

## 2018-04-15 RX ADMIN — Medication 2 MILLIGRAM(S): at 04:09

## 2018-04-15 RX ADMIN — HEPARIN SODIUM 5000 UNIT(S): 5000 INJECTION INTRAVENOUS; SUBCUTANEOUS at 15:06

## 2018-04-15 RX ADMIN — CITALOPRAM 20 MILLIGRAM(S): 10 TABLET, FILM COATED ORAL at 13:20

## 2018-04-15 RX ADMIN — Medication 250 MILLIGRAM(S): at 06:00

## 2018-04-15 RX ADMIN — PANTOPRAZOLE SODIUM 40 MILLIGRAM(S): 20 TABLET, DELAYED RELEASE ORAL at 13:20

## 2018-04-15 RX ADMIN — Medication 50 MILLILITER(S): at 18:57

## 2018-04-15 RX ADMIN — Medication 112 MILLIGRAM(S): at 21:56

## 2018-04-15 RX ADMIN — Medication 81 MILLIGRAM(S): at 13:19

## 2018-04-15 RX ADMIN — PROPOFOL 2.18 MICROGRAM(S)/KG/MIN: 10 INJECTION, EMULSION INTRAVENOUS at 04:09

## 2018-04-15 RX ADMIN — HEPARIN SODIUM 5000 UNIT(S): 5000 INJECTION INTRAVENOUS; SUBCUTANEOUS at 21:22

## 2018-04-15 RX ADMIN — PROPOFOL 2.18 MICROGRAM(S)/KG/MIN: 10 INJECTION, EMULSION INTRAVENOUS at 05:50

## 2018-04-15 RX ADMIN — PROPOFOL 2.18 MICROGRAM(S)/KG/MIN: 10 INJECTION, EMULSION INTRAVENOUS at 21:25

## 2018-04-15 RX ADMIN — HEPARIN SODIUM 5000 UNIT(S): 5000 INJECTION INTRAVENOUS; SUBCUTANEOUS at 10:50

## 2018-04-15 RX ADMIN — PROPOFOL 2.18 MICROGRAM(S)/KG/MIN: 10 INJECTION, EMULSION INTRAVENOUS at 13:20

## 2018-04-15 RX ADMIN — CLOPIDOGREL BISULFATE 75 MILLIGRAM(S): 75 TABLET, FILM COATED ORAL at 13:42

## 2018-04-15 RX ADMIN — Medication 1 APPLICATION(S): at 13:20

## 2018-04-15 RX ADMIN — Medication 50 MILLILITER(S): at 18:33

## 2018-04-15 RX ADMIN — ETOMIDATE 20 MILLIGRAM(S): 2 INJECTION INTRAVENOUS at 05:29

## 2018-04-15 RX ADMIN — ATENOLOL 25 MILLIGRAM(S): 25 TABLET ORAL at 13:20

## 2018-04-15 RX ADMIN — Medication 100 MILLIGRAM(S): at 05:30

## 2018-04-15 RX ADMIN — TAMSULOSIN HYDROCHLORIDE 0.4 MILLIGRAM(S): 0.4 CAPSULE ORAL at 21:24

## 2018-04-15 RX ADMIN — HYDROMORPHONE HYDROCHLORIDE 1 MILLIGRAM(S): 2 INJECTION INTRAMUSCULAR; INTRAVENOUS; SUBCUTANEOUS at 21:30

## 2018-04-15 RX ADMIN — ATORVASTATIN CALCIUM 40 MILLIGRAM(S): 80 TABLET, FILM COATED ORAL at 21:30

## 2018-04-15 RX ADMIN — FINASTERIDE 5 MILLIGRAM(S): 5 TABLET, FILM COATED ORAL at 13:21

## 2018-04-15 NOTE — H&P ADULT - PROBLEM SELECTOR PLAN 2
2/2 prerenal azotemia vs cardiorenal syndrome  hold IVF for now given possible congestion noted on CXR  - urine lytes, calculate FENA  - hannon, renal dose cefepime

## 2018-04-15 NOTE — ED ADULT NURSE REASSESSMENT NOTE - NS ED NURSE REASSESS COMMENT FT1
Patient was intubated by MD Garcia for respiratory distress and abnormal venous blood gas.  Post intubation xrays performed. Secvond peripheral line established. Propofol infusing as per order. Patient is tolerating ventilator well with ordered settings. On a cardiac monitor with alarms on

## 2018-04-15 NOTE — ED PROVIDER NOTE - CRITICAL CARE PROVIDED
additional history taking/consultation with other physicians/interpretation of diagnostic studies/direct patient care (not related to procedure)/consult w/ pt's family directly relating to pts condition/documentation

## 2018-04-15 NOTE — ADVANCED PRACTICE NURSE CONSULT - ASSESSMENT
This is a 89yr old male patient admitted for Acute Respiratory Failure, presenting with the following:  -There is an Unstageable Pressure Injury to the Coccyx (5cm x 5cm) with slough, pink tissue, drainage, and surrounding tissue maceration  -There is an Unstageable pressure Injury to the L. Heel (2cm x 2cm) with eschar and no drainage

## 2018-04-15 NOTE — ED PROVIDER NOTE - MEDICAL DECISION MAKING DETAILS
pt in respiratory failure (low O2 to 70's, and high co2 to 71)  needed emergent intubation.   ICU consulted  family at bedside and aware of situation

## 2018-04-15 NOTE — PROGRESS NOTE ADULT - SUBJECTIVE AND OBJECTIVE BOX
INTERVAL /OVERNIGHT EVENTS: no events since admission    PRESSORS: [ ] YES [x ] NO    ANTIBIOTICS:  vancomycin                DATE STARTED: 4/15  ANTIBIOTICS:  cefepime                DATE STARTED: 4/15    Antimicrobial:  cefepime  IVPB      cefepime  IVPB 1000 milliGRAM(s) IV Intermittent once  vancomycin  IVPB 1000 milliGRAM(s) IV Intermittent once    Cardiovascular:  amLODIPine   Tablet 10 milliGRAM(s) Oral daily  ATENolol  Tablet 25 milliGRAM(s) Oral daily  isosorbide   mononitrate ER Tablet (IMDUR) 30 milliGRAM(s) Oral daily  tamsulosin 0.4 milliGRAM(s) Oral at bedtime    Pulmonary: N/A    Hematalogic:  aspirin  chewable 81 milliGRAM(s) Oral daily  clopidogrel Tablet 75 milliGRAM(s) Oral daily  heparin  Injectable 5000 Unit(s) SubCutaneous every 8 hours    Other:  atorvastatin 40 milliGRAM(s) Oral at bedtime  citalopram 20 milliGRAM(s) Oral daily  etomidate Injectable 20 milliGRAM(s) IV Push once  finasteride 5 milliGRAM(s) Oral daily  insulin glargine Injectable (LANTUS) 10 Unit(s) SubCutaneous at bedtime  insulin lispro (HumaLOG) corrective regimen sliding scale   SubCutaneous every 6 hours  pantoprazole  Injectable 40 milliGRAM(s) IV Push daily  propofol Infusion 5 MICROgram(s)/kG/Min IV Continuous <Continuous>  succinylcholine Injectable 100 milliGRAM(s) IV Push once    amLODIPine   Tablet 10 milliGRAM(s) Oral daily  aspirin  chewable 81 milliGRAM(s) Oral daily  ATENolol  Tablet 25 milliGRAM(s) Oral daily  atorvastatin 40 milliGRAM(s) Oral at bedtime  cefepime  IVPB      cefepime  IVPB 1000 milliGRAM(s) IV Intermittent once  citalopram 20 milliGRAM(s) Oral daily  clopidogrel Tablet 75 milliGRAM(s) Oral daily  etomidate Injectable 20 milliGRAM(s) IV Push once  finasteride 5 milliGRAM(s) Oral daily  heparin  Injectable 5000 Unit(s) SubCutaneous every 8 hours  insulin glargine Injectable (LANTUS) 10 Unit(s) SubCutaneous at bedtime  insulin lispro (HumaLOG) corrective regimen sliding scale   SubCutaneous every 6 hours  isosorbide   mononitrate ER Tablet (IMDUR) 30 milliGRAM(s) Oral daily  pantoprazole  Injectable 40 milliGRAM(s) IV Push daily  propofol Infusion 5 MICROgram(s)/kG/Min IV Continuous <Continuous>  succinylcholine Injectable 100 milliGRAM(s) IV Push once  tamsulosin 0.4 milliGRAM(s) Oral at bedtime  vancomycin  IVPB 1000 milliGRAM(s) IV Intermittent once    Drug Dosing Weight  Height (cm): 170.18 (15 Apr 2018 02:27)  Weight (kg): 72.6 (15 Apr 2018 02:27)  BMI (kg/m2): 25.1 (15 Apr 2018 02:27)  BSA (m2): 1.84 (15 Apr 2018 02:27)    CENTRAL LINE: [ ] YES [x ] NO      HANNON: [x ] YES [ ] NO    DATE INSERTED: 4/15  REMOVE:  [ ] YES [x ] NO  EXPLAIN: urine output monitoring in critically ill patient    A-LINE:  [ ] YES [x ] NO      PMH -reviewed admission note, no change since admission    ICU Vital Signs Last 24 Hrs  T(C): 36.4 (15 Apr 2018 05:38), Max: 37 (15 Apr 2018 02:27)  T(F): 97.6 (15 Apr 2018 05:38), Max: 98.6 (15 Apr 2018 02:27)  HR: 73 (15 Apr 2018 06:00) (73 - 92)  BP: 102/48 (15 Apr 2018 06:00) (102/48 - 166/95)  BP(mean): 62 (15 Apr 2018 06:00) (62 - 62)  RR: 14 (15 Apr 2018 06:00) (14 - 19)  SpO2: 100% (15 Apr 2018 06:00) (99% - 100%)      ABG - ( 15 Apr 2018 04:45 )  pH: 7.47  /  pCO2: 34    /  pO2: 192   / HCO3: 24    / Base Excess: 1.1   /  SaO2: >99                     Mode: AC/ CMV (Assist Control/ Continuous Mandatory Ventilation)  RR (machine): 14  TV (machine): 450  FiO2: 80  PEEP: 5  ITime: 1  MAP: 16  PIP: 32      PHYSICAL EXAM:    GENERAL:   HEAD: atraumatic, normocephalic   EYES: PERRLA, white sclera.   ENMT: nasal mucosa- Oral cavity-   NECK: supple, JVD/ no JVD, thyroid-   LYMPH: no palpable lymph nodes     SKIN: warm, dry   CHEST/LUNG: ET tube: size        cm at lip. No Chest deformity , no chest tenderness. bilateral breath sounds,no  adventitious sounds  HEART: RRR, no m/r/g   ABDOMEN: soft, nontender, nondistended; bowel sounds.  :hannon catheter.  EXTREMITIES: +1 non-pitting edema, no cyanosis, no clubbing.  NEURO: AA0X , mood/ affect-, no focal neuro deficits        LABS:  CBC Full  -  ( 15 Apr 2018 03:08 )  WBC Count : 9.3 K/uL  Hemoglobin : 9.6 g/dL  Hematocrit : 27.5 %  Platelet Count - Automated : 239 K/uL  Mean Cell Volume : 92.5 fl  Mean Cell Hemoglobin : 32.2 pg  Mean Cell Hemoglobin Concentration : 34.8 gm/dL  Auto Neutrophil # : 7.1 K/uL  Auto Lymphocyte # : 1.4 K/uL  Auto Monocyte # : 0.6 K/uL  Auto Eosinophil # : 0.1 K/uL  Auto Basophil # : 0.1 K/uL  Auto Neutrophil % : 77.0 %  Auto Lymphocyte % : 14.6 %  Auto Monocyte % : 6.5 %  Auto Eosinophil % : 0.9 %  Auto Basophil % : 0.9 %    04-15    135  |  99  |  32<H>  ----------------------------<  96  4.3   |  25  |  2.91<H>    Ca    7.6<L>      15 Apr 2018 03:08    TPro  5.6<L>  /  Alb  2.4<L>  /  TBili  0.3  /  DBili  x   /  AST  26  /  ALT  15  /  AlkPhos  96  04-15      Urinalysis Basic - ( 15 Apr 2018 04:06 )    Color: Yellow / Appearance: Clear / S.005 / pH: x  Gluc: x / Ketone: Negative  / Bili: Negative / Urobili: 4   Blood: x / Protein: 100 / Nitrite: Negative   Leuk Esterase: Trace / RBC: 2-5 /HPF / WBC 3-5 /HPF   Sq Epi: x / Non Sq Epi: Few /HPF / Bacteria: Trace /HPF              CRITICAL CARE TIME SPENT: 35 minutes

## 2018-04-15 NOTE — H&P ADULT - PROBLEM SELECTOR PLAN 1
2/2 hypoventilation possibly 2/2 aspiration pna vs CHF exacerbation  ? RLL infiltrate noted on CXR  s/p 1 dose vancomycin and cefepime (for possible infected right hip incision)  - mechanical ventilation, propofol sedation  - follow up BNP, procalcitonin  - if BNP elevated and BP tolerates, diurese 2/2 hypoventilation possibly 2/2 aspiration pna vs CHF exacerbation  new right pleural effusion and bilateral congestion   s/p 1 dose vancomycin and cefepime (for possible infected right hip incision)  - mechanical ventilation, propofol sedation  - follow up BNP, procalcitonin  - if BP tolerates, diurese

## 2018-04-15 NOTE — CONSULT NOTE ADULT - SUBJECTIVE AND OBJECTIVE BOX
HPI:  89yo male from home, lives with wife, no home attendant, PMH CAD s/p CABG (12-13 years ago), s/p left hip pinning for left hip fracture (8 years ago), DM, HTN, BPH with urinary retention p/w worsening shortness of breath and altered mental status. History and ROS obtained from son (Esteban [phone], at bedside).  Patient was recently admitted to [facility] from 3/29 to 2018 for right hip fracture after mechanical fall.  Patient underwent right hip arthroplasty on 3/30 with Dr Sosa, complicated by ileus and E coli UTI, and discharged on  to home.  Patient has been receiving home PT.  2 days ago, while walking with therapist, patient endorsed shortness of breath and wheezing, resolved after rest.  The next day, son called patient's wife and was told he was doing well.  This early morning at 1am, grandson noticed that patient in distress with worsening sob and was disoriented.  911 called.  Son states that patient did not endorse fever, headache, change in vision or hearing, sore throat, chest pain, vomiting, diarrhea, hematochezia or hematuria.  Last colonoscopy 7 years ago. Endorses occasional nausea and 'gagging' with food.     3/29 TTE: grossly nl LV function    In ED, T 98.6 P 81 /80 RR 16    Patient was in CT scanner for head CT when patient became more short of breath and VBG returned ph7.13/pC02 71  patient was intubated     CXR: ? RLL infiltrate vs effusion, prominent bilateral hilum  EKG: SR @ 81bpm, right axis deviation, RBBB, no change from previous EKG  T1 0.121, BNP (15 Apr 2018 05:37)      PAST MEDICAL & SURGICAL HISTORY:  HLD (hyperlipidemia)  Hip fracture, left  CAD (coronary artery disease): s/p by pass surgery  Diabetes  Hypertension  S/P CABG (coronary artery bypass graft)  History of hip surgery      No Known Allergies      aspirin  chewable 81 milliGRAM(s) Oral daily  ATENolol  Tablet 25 milliGRAM(s) Oral daily  atorvastatin 40 milliGRAM(s) Oral at bedtime  cefepime  IVPB      citalopram 20 milliGRAM(s) Oral daily  clopidogrel Tablet 75 milliGRAM(s) Oral daily  finasteride 5 milliGRAM(s) Oral daily  heparin  Injectable 5000 Unit(s) SubCutaneous every 8 hours  insulin glargine Injectable (LANTUS) 10 Unit(s) SubCutaneous at bedtime  insulin lispro (HumaLOG) corrective regimen sliding scale   SubCutaneous every 6 hours  isosorbide   mononitrate ER Tablet (IMDUR) 30 milliGRAM(s) Oral daily  pantoprazole  Injectable 40 milliGRAM(s) IV Push daily  propofol Infusion 5 MICROgram(s)/kG/Min IV Continuous <Continuous>  tamsulosin 0.4 milliGRAM(s) Oral at bedtime      Social Hx:    FAMILY HISTORY:  No pertinent family history in first degree relatives        ROS  [ x ] UNABLE TO ELICIT    General:  [  ] None  [  ] Fever  [  ] Chills  [  ] Malaise    Skin:  [  ] None [  ] Rash  [  ] Wound  [  ] Ulcer    HEENT:  [  ] None  [  ] Sore Throat  [  ] Nasal congestion/ runny nose  [  ] Photophobia [  ] Neck pain      Chest:  [  ] None   [  ] SOB  [  ] Cough  [  ] None    Cardiovascular:   [  ] None  [  ] CP  [  ] Palpitation    Gastrointestinal:  [  ] None  [  ] Abd pain   [  ] Nausea    [  ] Vomiting   [  ] Diarrhea	     Genitourinary:  [  ] None [  ] Polyuria   [  ] Urgency  [  ] Frequency  [  ] Dysuria    [  ]  Hematuria       Musculoskeletal:  [  ] None [  ] Back Pain	[  ] Body aches  [  ] Joint pain    Neurological: [  ] None [  ]Dizziness  [  ]Visual Disturbance  [  ]Headaches   [  ] Weakness      PHYSICAL EXAM:    Vital Signs Last 24 Hrs  T(C): 36.2 (15 Apr 2018 08:00), Max: 37 (15 Apr 2018 02:27)  T(F): 97.2 (15 Apr 2018 08:00), Max: 98.6 (15 Apr 2018 02:27)  HR: 63 (15 Apr 2018 11:00) (62 - 92)  BP: 130/46 (15 Apr 2018 11:00) (102/48 - 166/95)  BP(mean): 67 (15 Apr 2018 11:00) (62 - 72)  RR: 14 (15 Apr 2018 11:00) (14 - 19)  SpO2: 100% (15 Apr 2018 11:00) (98% - 100%)    Constitutional:    HEENT: [  ] Wnl  [  ] Pharyngeal congestion [ x x] ET and NGT in place.    Neck:  [ x ] Supple  [  ]Lymphadenopathy  [ x ] No JVD   [  ] JVD  [  ] Masses   [  ] WNL    CHEST/Respiratory:  [  ]Clear to auscultation  [ x ] Rales   [  ] Rhonchi   [  ] Wheezing     [  ] Chest Tenderness      Cardiovascular:  [ x ] Reg S1 S2   [  ] Irreg S1 S2   [ x ]No Murmur  [  ] +ve Murmurs  [  ]Systolic [  ]Diastolic      Abdomen:  [ x ] Soft  [ x ] No tendrerness  [  ] Tenderness  [  ] Organomegaly  [  ] ABD Distention  [  ] Rigidity                       [ x ] No Regidity                       [ x ] No Rebound Tenderness  [  ] No Guarding Rigidity  [  ] Rebound Tenderness[  ] Guarding Rigidity                          [ x ]  +ve Bowel Sounds  [  ] Decreased Bowel Sounds    [  ] Absent Bowel Sounds                            Extremities: [  ] No edema [ x ] Edema  [  ] Clubbing   [  ] Cyanosis                         [ x ] No Tender Calf muscles  [  ] Tender Calf muscles                        [ x ] Palpable peripheral pulses    Neurological: [  ] Awake  [  ] Alert  [  ] Oriented  x                             [  ] Confused  [  ] Drowsy  [ x ] respond to painful stimuli  [  ] Unresponsive    Skin:  [  ] Intact [  ] Redness [  ] Thrombophlebitis  [  ] Rashes  [  ] Dry  [ x ] Right hip surgical draining wound    Ortho:  [  ] Joint Swelling  [  ] Joint erythema [  ] Joint tenderness                [  ] Increased temp. to touch  [  ] DJD [ x ] WNL      LABS/DIAGNOSTIC TESTS                          9.6    9.3   )-----------( 239      ( 15 Apr 2018 03:08 )             27.5     WBC Count: 9.3 K/uL (04-15 @ 03:08)      04-15    135  |  99  |  32<H>  ----------------------------<  96  4.3   |  25  |  2.91<H>    Ca    7.6<L>      15 Apr 2018 03:08    TPro  5.6<L>  /  Alb  2.4<L>  /  TBili  0.3  /  DBili  x   /  AST  26  /  ALT  15  /  AlkPhos  96  04-15      Urinalysis Basic - ( 15 Apr 2018 04:06 )    Color: Yellow / Appearance: Clear / S.005 / pH: x  Gluc: x / Ketone: Negative  / Bili: Negative / Urobili: 4   Blood: x / Protein: 100 / Nitrite: Negative   Leuk Esterase: Trace / RBC: 2-5 /HPF / WBC 3-5 /HPF   Sq Epi: x / Non Sq Epi: Few /HPF / Bacteria: Trace /HPF        LIVER FUNCTIONS - ( 15 Apr 2018 03:08 )  Alb: 2.4 g/dL / Pro: 5.6 g/dL / ALK PHOS: 96 U/L / ALT: 15 U/L DA / AST: 26 U/L / GGT: x                   CULTURES:   None Yet.    RADIOLOGY    CXR:    EXAM:  XR CHEST AP OR PA 1V                            PROCEDURE DATE:  04/15/2018          INTERPRETATION:  PORTABLE CHEST X-RAY    HISTORY: s/p intubation, confirm placement.    COMPARISON: 4/15/2018.    FINDINGS/  IMPRESSION:  Interval intubation with ET tube tip approximately 3.3 cm above the   riaz. No pneumothorax.    Pulmonary vascular congestion, worsening bilateral perihilar airspace   opacities. Small bilateral pleural effusions are unchanged.    The cardiac silhouette is not accurately assessed by AP technique. Aortic   calcifications. Sternotomy wires.        EXAM:  CT BRAIN                            PROCEDURE DATE:  04/15/2018          INTERPRETATION:  CLINICAL INFORMATION: Altered mental status.    COMPARISON: None available.    PROCEDURE:   Noncontrast CT of the Head was performed from the skull base to the   vertex. Coronal and Sagittal reformats were obtained.    FINDINGS:    There is no acute intracranial hemorrhage, mass effect, midline shift,   extra-axial collection, hydrocephalus, or evidence of acute vascular   territorial infarction. Mild patchy hypodensities within the   periventricular and subcortical white matter, although nonspecific,   likely reflect chronic microvascular disease. Cerebral volume loss   results in prominence of the ventricles and sulci.    The visualized paranasal sinuses and mastoid air cells are clear.   Visualized osseous structures are intact.    IMPRESSION:     No acute intracranial hemorrhage, mass effect, or evidence of acute   vascular territorial infarction.    If clinical symptoms persist or worsen, short-term repeat CT head or more   sensitive evaluation with brain MRI may be obtained, if no   contraindications exist.

## 2018-04-15 NOTE — H&P ADULT - PROBLEM SELECTOR PLAN 4
BSL controlled  - npo, hss, decreased lantus to 10 Uqhs  hold oral hypoglycemics BSL controlled  - npo, hss, decreased lantus to 10 U qhs  hold oral hypoglycemics

## 2018-04-15 NOTE — H&P ADULT - HISTORY OF PRESENT ILLNESS
89yo male from home, lives with wife, no home attendant, PMH CAD s/p CABG (12-13 years ago), s/p left hip pinning for left hip fracture (8 years ago), DM, HTN, BPH with urinary retention p/w worsening shortness of breath and altered mental status. History and ROS obtained from son (Esteban [phone], at bedside).  Patient was recently admitted to [facility] from 3/29 to 4/6/2018 for right hip fracture after mechanical fall.  Patient underwent right hip arthroplasty on 3/30 with Dr Sosa, complicated by ileus and E coli UTI, and discharged on 4/6 to home.  Patient has been receiving home PT.  2 days ago, while walking with therapist, patient endorsed shortness of breath and wheezing, resolved after rest.  The next day, son called patient's wife and was told he was doing well.  This early morning at 1am, grandson noticed that patient in distress with worsening sob and was disoriented.  911 called.  Son 89yo male from home, lives with wife, no home attendant, PMH CAD s/p CABG (12-13 years ago), s/p left hip pinning for left hip fracture (8 years ago), DM, HTN, BPH with urinary retention p/w worsening shortness of breath and altered mental status. History and ROS obtained from son (Esteban [phone], at bedside).  Patient was recently admitted to [facility] from 3/29 to 4/6/2018 for right hip fracture after mechanical fall.  Patient underwent right hip arthroplasty on 3/30 with Dr Sosa, complicated by ileus and E coli UTI, and discharged on 4/6 to home.  Patient has been receiving home PT.  2 days ago, while walking with therapist, patient endorsed shortness of breath and wheezing, resolved after rest.  The next day, son called patient's wife and was told he was doing well.  This early morning at 1am, grandson noticed that patient in distress with worsening sob and was disoriented.  911 called.  Son states that patient did not endorse fever, headache, change in vision or hearing, sore throat, chest pain, vomiting, diarrhea, hematochezia or hematuria.  Last colonoscopy 7 years ago. Endorses occasional nausea and 'gagging' with food.     3/29 TTE: grossly nl LV function    In ED, T 98.6 P 81 /80 RR 16    Patient was in CT scanner for head CT when patient became more short of breath and VBG returned ph7.13/pC02 71  patient was intubated     CXR: ? RLL infiltrate vs effusion, prominent bilateral hilum  EKG: SR @ 81bpm, right axis deviation, RBBB, no change from previous EKG  T1 0.121, BNP

## 2018-04-15 NOTE — H&P ADULT - PROBLEM SELECTOR PLAN 5
target /80  no hypotension noted   c/w isosorbide, norvasc for afterload reduction in context of possible CHF exacerbation target /80  no hypotension noted   c/w isosorbide for afterload reduction in context of possible CHF exacerbation

## 2018-04-15 NOTE — CONSULT NOTE ADULT - ASSESSMENT
89yo male from home, lives with wife, no home attendant, PMH CAD s/p CABG (12-13 years ago), s/p left hip pinning for left hip fracture (8 years ago), DM, HTN, BPH with urinary retention p/w worsening shortness of breath and altered mental status. History and ROS obtained from son (Esteban [phone], at bedside).  Patient was recently admitted to [facility] from 3/29 to 4/6/2018 for right hip fracture after mechanical fall.   Patient was intubated and admitted to ICU and was given Vancomycin and Cefepime.

## 2018-04-15 NOTE — H&P ADULT - PROBLEM SELECTOR PLAN 3
greenish drainage from right hip wound, concern for pseudomonas infection  s/p vancomyin, c/w renal dose cefepime  - follow up BCx  - consult Dr Sosa in AM

## 2018-04-15 NOTE — H&P ADULT - ATTENDING COMMENTS
MICU ATTENDING.    I have personally seen and examine the patient. I was physically present fro the key elements service provided. I agree with the above history, physical and plan which I have reviewed and edited where appropriate. I total spent 35 min critical care time.   89 y/o presented with respiratory  distress and altered mental status. Intubated in ER. Admitted to ICU for further MX   A/P respiratory failure   CHF vs pneumonia  S/P hip surgery   ARF on top of CKD  MICU COnt MV, decrease FIO2 as tolerated  Panculture  Empiric antibiotics  Ortho f/u   Monitor renal function, urinary output  MOnitor FS, RISS

## 2018-04-15 NOTE — H&P ADULT - PROBLEM SELECTOR PROBLEM 4
Type 2 diabetes mellitus with stage 3 chronic kidney disease, unspecified long term insulin use status

## 2018-04-15 NOTE — PROGRESS NOTE ADULT - ASSESSMENT
89yo male from home, lives with wife, no home attendant, PMH CAD s/p CABG (12-13 years ago), s/p left hip pinning for left hip fracture (8 years ago), DM, HTN, BPH with urinary retention p/w worsening shortness of breath and altered mental status.  Intubated for hypercapneic respiratory failure 2/2 hypoventilation 2/2 likely aspiration pna vs pulmonary edema     Problem/Plan - 1:  ·  Problem: Acute respiratory failure with hypercapnia.  Plan: 2/2 hypoventilation possibly 2/2 aspiration pna vs CHF exacerbation  new right pleural effusion and bilateral congestion   s/p 1 dose vancomycin and cefepime (for possible infected right hip incision)  - mechanical ventilation, propofol sedation  - follow up BNP, procalcitonin  - if BP tolerates, diurese.     Problem/Plan - 2:  ·  Problem: LOBITO (acute kidney injury).  Plan: 2/2 prerenal azotemia vs cardiorenal syndrome  hold IVF for now given possible congestion noted on CXR  - urine lytes, calculate FENA  - hannon, renal dose cefepime.      Problem/Plan - 3:  ·  Problem: Closed fracture of right hip with delayed healing, subsequent encounter.  Plan: greenish drainage from right hip wound, concern for pseudomonas infection  s/p vancomyin, c/w renal dose cefepime  - follow up BCx  - consult Dr Sosa in AM.      Problem/Plan - 4:  ·  Problem: Type 2 diabetes mellitus with stage 3 chronic kidney disease, unspecified long term insulin use status.  Plan: BSL controlled  - npo, hss, decreased lantus to 10 U qhs  hold oral hypoglycemics.     Problem/Plan - 5:  ·  Problem: Essential hypertension.  Plan: target /80  no hypotension noted   c/w isosorbide for afterload reduction in context of possible CHF exacerbation.     Problem/Plan - 6:  Problem: Coronary artery disease involving coronary bypass graft of native heart without angina pectoris. Plan: c/w ASA, plavix, BB  EKG without ischemic changes  trend troponins in context of possible CHF exacerbation.     Problem/Plan - 7:  ·  Problem: Need for prophylactic measure.  Plan: heparin SQ  GI ppx.

## 2018-04-15 NOTE — ADVANCED PRACTICE NURSE CONSULT - RECOMMEDATIONS
-Clean all wounds with normal saline and apply skin prep to the surrounding skin  -Apply Collagenase ointment to the slough areas of the Coccyx wound bed, apply saline moistened gauze, and cover with a Foam dressing Daily PRN  -Leave the L. Heel wound open to air  -Elevate/float the patients heels using heel protectors and reposition the patient Q 2hrs using wedges or pilows

## 2018-04-15 NOTE — ED PROVIDER NOTE - CARE PLAN
Principal Discharge DX:	Acute respiratory failure with hypoxia and hypercapnia  Secondary Diagnosis:	Altered mental status  Secondary Diagnosis:	Diabetes

## 2018-04-15 NOTE — ED PROVIDER NOTE - OBJECTIVE STATEMENT
brought in by family for altered mental status for one/two days.  also sob noted.  pt recently had right hip replacement (about two weeks ago)

## 2018-04-15 NOTE — H&P ADULT - ASSESSMENT
89yo male from home, lives with wife, no home attendant, PMH CAD s/p CABG (12-13 years ago), s/p left hip pinning for left hip fracture (8 years ago), DM, HTN, BPH with urinary retention p/w worsening shortness of breath and altered mental status.  Intubated for hypercapneic respiratory failure 2/2 hypoventilation 2/2 likely aspiration pna vs pulmonary edema

## 2018-04-15 NOTE — CONSULT NOTE ADULT - SUBJECTIVE AND OBJECTIVE BOX
89yo s/p right hip hemiarthroplasty for displaced femoral neck fracture here on 3/30/18.  Was discharged home and was walking with PT at home.   Developed SOB and readmitted and started on antibiotics for pneumonia. Some green drainage from right hip wound reported.    PMH: S/P fixation of left intertrochanteric hip fracture healed in good alignment    PE: In ICU intubated and sedated       Right leg shortened and internally rotated able to externally rotate past neutral.       Right hip dressing changed with nurse who tells me dressing is from morning.       Hip incision clean and dry with no discharge on dressing.    Xray: Stat portable xray of pelvis shows right hip prosthesis dislocated.    A/P: Right femoral neck fracture s/p hemiarthroplasty         Posterior dislocation of right hip prosthesis.           Procedure Note         Discussed right hip prosthesis dislocation with ICU medical staff and need for reduction.         Medication was adjusted and right hip reduction performed with leg clinically reduced and much improved motion including external rotation.         Xray of pelvis shows right hip reduced.         Abduction pillow placed between legs and nursing staff instructed to keep leg abducted and extended with pillow in place.          PT for mobilization and ambulation with strict hip precautions when medical condition allows.

## 2018-04-15 NOTE — H&P ADULT - PROBLEM SELECTOR PLAN 6
c/w ASA, plavix, BB  EKG without ischemic changes  trend troponins in context of possible CHF exacerbation

## 2018-04-16 DIAGNOSIS — E11.9 TYPE 2 DIABETES MELLITUS WITHOUT COMPLICATIONS: ICD-10-CM

## 2018-04-16 DIAGNOSIS — J96.01 ACUTE RESPIRATORY FAILURE WITH HYPOXIA: ICD-10-CM

## 2018-04-16 LAB
ANION GAP SERPL CALC-SCNC: 9 MMOL/L — SIGNIFICANT CHANGE UP (ref 5–17)
BASE EXCESS BLDA CALC-SCNC: 1.4 MMOL/L — SIGNIFICANT CHANGE UP (ref -2–2)
BASE EXCESS BLDA CALC-SCNC: 1.8 MMOL/L — SIGNIFICANT CHANGE UP (ref -2–2)
BASOPHILS # BLD AUTO: 0.1 K/UL — SIGNIFICANT CHANGE UP (ref 0–0.2)
BASOPHILS NFR BLD AUTO: 0.8 % — SIGNIFICANT CHANGE UP (ref 0–2)
BLOOD GAS COMMENTS ARTERIAL: SIGNIFICANT CHANGE UP
BLOOD GAS COMMENTS ARTERIAL: SIGNIFICANT CHANGE UP
BUN SERPL-MCNC: 29 MG/DL — HIGH (ref 7–18)
CALCIUM SERPL-MCNC: 7.2 MG/DL — LOW (ref 8.4–10.5)
CHLORIDE SERPL-SCNC: 98 MMOL/L — SIGNIFICANT CHANGE UP (ref 96–108)
CO2 SERPL-SCNC: 26 MMOL/L — SIGNIFICANT CHANGE UP (ref 22–31)
CREAT SERPL-MCNC: 3.09 MG/DL — HIGH (ref 0.5–1.3)
CULTURE RESULTS: NO GROWTH — SIGNIFICANT CHANGE UP
EOSINOPHIL # BLD AUTO: 0.1 K/UL — SIGNIFICANT CHANGE UP (ref 0–0.5)
EOSINOPHIL NFR BLD AUTO: 1.5 % — SIGNIFICANT CHANGE UP (ref 0–6)
GLUCOSE BLDC GLUCOMTR-MCNC: 160 MG/DL — HIGH (ref 70–99)
GLUCOSE BLDC GLUCOMTR-MCNC: 187 MG/DL — HIGH (ref 70–99)
GLUCOSE BLDC GLUCOMTR-MCNC: 188 MG/DL — HIGH (ref 70–99)
GLUCOSE BLDC GLUCOMTR-MCNC: 206 MG/DL — HIGH (ref 70–99)
GLUCOSE BLDC GLUCOMTR-MCNC: 231 MG/DL — HIGH (ref 70–99)
GLUCOSE BLDC GLUCOMTR-MCNC: 236 MG/DL — HIGH (ref 70–99)
GLUCOSE BLDC GLUCOMTR-MCNC: 252 MG/DL — HIGH (ref 70–99)
GLUCOSE SERPL-MCNC: 191 MG/DL — HIGH (ref 70–99)
HCO3 BLDA-SCNC: 25 MMOL/L — SIGNIFICANT CHANGE UP (ref 23–27)
HCO3 BLDA-SCNC: 26 MMOL/L — SIGNIFICANT CHANGE UP (ref 23–27)
HCT VFR BLD CALC: 25.3 % — LOW (ref 39–50)
HGB BLD-MCNC: 8.4 G/DL — LOW (ref 13–17)
HOROWITZ INDEX BLDA+IHG-RTO: 40 — SIGNIFICANT CHANGE UP
HOROWITZ INDEX BLDA+IHG-RTO: 50 — SIGNIFICANT CHANGE UP
LYMPHOCYTES # BLD AUTO: 0.9 K/UL — LOW (ref 1–3.3)
LYMPHOCYTES # BLD AUTO: 14.2 % — SIGNIFICANT CHANGE UP (ref 13–44)
MAGNESIUM SERPL-MCNC: 1.9 MG/DL — SIGNIFICANT CHANGE UP (ref 1.6–2.6)
MCHC RBC-ENTMCNC: 31.1 PG — SIGNIFICANT CHANGE UP (ref 27–34)
MCHC RBC-ENTMCNC: 33.3 GM/DL — SIGNIFICANT CHANGE UP (ref 32–36)
MCV RBC AUTO: 93.5 FL — SIGNIFICANT CHANGE UP (ref 80–100)
MONOCYTES # BLD AUTO: 0.4 K/UL — SIGNIFICANT CHANGE UP (ref 0–0.9)
MONOCYTES NFR BLD AUTO: 6.3 % — SIGNIFICANT CHANGE UP (ref 2–14)
NEUTROPHILS # BLD AUTO: 5 K/UL — SIGNIFICANT CHANGE UP (ref 1.8–7.4)
NEUTROPHILS NFR BLD AUTO: 77.2 % — HIGH (ref 43–77)
NT-PROBNP SERPL-SCNC: HIGH PG/ML (ref 0–450)
PCO2 BLDA: 35 MMHG — SIGNIFICANT CHANGE UP (ref 32–46)
PCO2 BLDA: 38 MMHG — SIGNIFICANT CHANGE UP (ref 32–46)
PH BLDA: 7.44 — SIGNIFICANT CHANGE UP (ref 7.35–7.45)
PH BLDA: 7.46 — HIGH (ref 7.35–7.45)
PHOSPHATE SERPL-MCNC: 4.2 MG/DL — SIGNIFICANT CHANGE UP (ref 2.5–4.5)
PLATELET # BLD AUTO: 193 K/UL — SIGNIFICANT CHANGE UP (ref 150–400)
PO2 BLDA: 101 MMHG — SIGNIFICANT CHANGE UP (ref 74–108)
PO2 BLDA: 102 MMHG — SIGNIFICANT CHANGE UP (ref 74–108)
POTASSIUM SERPL-MCNC: 4.3 MMOL/L — SIGNIFICANT CHANGE UP (ref 3.5–5.3)
POTASSIUM SERPL-SCNC: 4.3 MMOL/L — SIGNIFICANT CHANGE UP (ref 3.5–5.3)
RBC # BLD: 2.71 M/UL — LOW (ref 4.2–5.8)
RBC # FLD: 14 % — SIGNIFICANT CHANGE UP (ref 10.3–14.5)
SAO2 % BLDA: 98 % — HIGH (ref 92–96)
SAO2 % BLDA: 98 % — HIGH (ref 92–96)
SODIUM SERPL-SCNC: 133 MMOL/L — LOW (ref 135–145)
SPECIMEN SOURCE: SIGNIFICANT CHANGE UP
VANCOMYCIN TROUGH SERPL-MCNC: 10.5 UG/ML — SIGNIFICANT CHANGE UP (ref 10–20)
WBC # BLD: 6.5 K/UL — SIGNIFICANT CHANGE UP (ref 3.8–10.5)
WBC # FLD AUTO: 6.5 K/UL — SIGNIFICANT CHANGE UP (ref 3.8–10.5)

## 2018-04-16 PROCEDURE — 71045 X-RAY EXAM CHEST 1 VIEW: CPT | Mod: 26

## 2018-04-16 RX ORDER — FUROSEMIDE 40 MG
60 TABLET ORAL EVERY 12 HOURS
Qty: 0 | Refills: 0 | Status: DISCONTINUED | OUTPATIENT
Start: 2018-04-16 | End: 2018-04-17

## 2018-04-16 RX ORDER — INSULIN LISPRO 100/ML
VIAL (ML) SUBCUTANEOUS EVERY 4 HOURS
Qty: 0 | Refills: 0 | Status: DISCONTINUED | OUTPATIENT
Start: 2018-04-16 | End: 2018-04-18

## 2018-04-16 RX ORDER — PROPOFOL 10 MG/ML
5 INJECTION, EMULSION INTRAVENOUS
Qty: 1000 | Refills: 0 | Status: DISCONTINUED | OUTPATIENT
Start: 2018-04-16 | End: 2018-04-16

## 2018-04-16 RX ORDER — VANCOMYCIN HCL 1 G
500 VIAL (EA) INTRAVENOUS EVERY 24 HOURS
Qty: 0 | Refills: 0 | Status: DISCONTINUED | OUTPATIENT
Start: 2018-04-16 | End: 2018-04-20

## 2018-04-16 RX ORDER — VANCOMYCIN HCL 1 G
500 VIAL (EA) INTRAVENOUS EVERY 24 HOURS
Qty: 0 | Refills: 0 | Status: DISCONTINUED | OUTPATIENT
Start: 2018-04-16 | End: 2018-04-16

## 2018-04-16 RX ADMIN — CLOPIDOGREL BISULFATE 75 MILLIGRAM(S): 75 TABLET, FILM COATED ORAL at 12:15

## 2018-04-16 RX ADMIN — HYDROMORPHONE HYDROCHLORIDE 1 MILLIGRAM(S): 2 INJECTION INTRAMUSCULAR; INTRAVENOUS; SUBCUTANEOUS at 08:32

## 2018-04-16 RX ADMIN — CITALOPRAM 20 MILLIGRAM(S): 10 TABLET, FILM COATED ORAL at 12:09

## 2018-04-16 RX ADMIN — CEFEPIME 100 MILLIGRAM(S): 1 INJECTION, POWDER, FOR SOLUTION INTRAMUSCULAR; INTRAVENOUS at 06:39

## 2018-04-16 RX ADMIN — HYDROMORPHONE HYDROCHLORIDE 1 MILLIGRAM(S): 2 INJECTION INTRAMUSCULAR; INTRAVENOUS; SUBCUTANEOUS at 08:47

## 2018-04-16 RX ADMIN — HEPARIN SODIUM 5000 UNIT(S): 5000 INJECTION INTRAVENOUS; SUBCUTANEOUS at 15:00

## 2018-04-16 RX ADMIN — Medication 1 APPLICATION(S): at 12:18

## 2018-04-16 RX ADMIN — Medication 60 MILLIGRAM(S): at 17:54

## 2018-04-16 RX ADMIN — ATORVASTATIN CALCIUM 40 MILLIGRAM(S): 80 TABLET, FILM COATED ORAL at 20:25

## 2018-04-16 RX ADMIN — Medication 100 MILLIGRAM(S): at 08:32

## 2018-04-16 RX ADMIN — ISOSORBIDE MONONITRATE 30 MILLIGRAM(S): 60 TABLET, EXTENDED RELEASE ORAL at 12:19

## 2018-04-16 RX ADMIN — TAMSULOSIN HYDROCHLORIDE 0.4 MILLIGRAM(S): 0.4 CAPSULE ORAL at 21:28

## 2018-04-16 RX ADMIN — PROPOFOL 2.01 MICROGRAM(S)/KG/MIN: 10 INJECTION, EMULSION INTRAVENOUS at 08:53

## 2018-04-16 RX ADMIN — Medication 2: at 21:28

## 2018-04-16 RX ADMIN — HEPARIN SODIUM 5000 UNIT(S): 5000 INJECTION INTRAVENOUS; SUBCUTANEOUS at 05:48

## 2018-04-16 RX ADMIN — PANTOPRAZOLE SODIUM 40 MILLIGRAM(S): 20 TABLET, DELAYED RELEASE ORAL at 12:10

## 2018-04-16 RX ADMIN — Medication 81 MILLIGRAM(S): at 12:10

## 2018-04-16 RX ADMIN — Medication 60 MILLIGRAM(S): at 10:27

## 2018-04-16 RX ADMIN — HEPARIN SODIUM 5000 UNIT(S): 5000 INJECTION INTRAVENOUS; SUBCUTANEOUS at 21:27

## 2018-04-16 RX ADMIN — FINASTERIDE 5 MILLIGRAM(S): 5 TABLET, FILM COATED ORAL at 12:09

## 2018-04-16 NOTE — PHYSICAL THERAPY INITIAL EVALUATION ADULT - CRITERIA FOR SKILLED THERAPEUTIC INTERVENTIONS
anticipated equipment needs at discharge/therapy frequency/impairments found/anticipated discharge recommendation/functional limitations in following categories/predicted duration of therapy intervention/risk reduction/prevention/rehab potential

## 2018-04-16 NOTE — PROGRESS NOTE ADULT - SUBJECTIVE AND OBJECTIVE BOX
Meds:  cefepime  IVPB 1000 milliGRAM(s) IV Intermittent every 24 hours  cefepime  IVPB        Allergies:  Allergies    No Known Allergies    Intolerances        ROS  [ x ] UNABLE TO ELICIT    General:  [  ] None  [  ] Fever  [  ] Chills  [  ] Malaise    Skin:  [  ] None [  ] Rash  [  ] Wound  [  ] Ulcer    HEENT:  [  ] None  [  ] Sore Throat  [  ] Nasal congestion/ runny nose  [  ] Photophobia [  ] Neck pain      Chest:  [  ] None   [  ] SOB  [  ] Cough  [  ] None    Cardiovascular:   [  ] None  [  ] CP  [  ] Palpitation    Gastrointestinal:  [  ] None  [  ] Abd pain   [  ] Nausea    [  ] Vomiting   [  ] Diarrhea	     Genitourinary:  [  ] None [  ] Polyuria   [  ] Urgency  [  ] Frequency  [  ] Dysuria    [  ]  Hematuria       Musculoskeletal:  [  ] None [  ] Back Pain	[  ] Body aches  [  ] Joint pain    Neurological: [  ] None [  ]Dizziness  [  ]Visual Disturbance  [  ]Headaches   [  ] Weakness          PHYSICAL EXAM:    Vital Signs Last 24 Hrs  T(C): 35.8 (16 Apr 2018 00:00), Max: 36.4 (15 Apr 2018 19:39)  T(F): 96.5 (16 Apr 2018 00:00), Max: 97.6 (15 Apr 2018 19:39)  HR: 61 (16 Apr 2018 05:49) (57 - 73)  BP: 110/53 (16 Apr 2018 04:00) (92/40 - 135/56)  BP(mean): 67 (16 Apr 2018 04:00) (54 - 72)  RR: 12 (16 Apr 2018 04:00) (12 - 19)  SpO2: 100% (16 Apr 2018 05:49) (98% - 100%)    Constitutional:    HEENT: [  ] Wnl  [  ] Pharyngeal congestion [ x x] ET and NGT in place.    Neck:  [ x ] Supple  [  ]Lymphadenopathy  [ x ] No JVD   [  ] JVD  [  ] Masses   [  ] WNL    CHEST/Respiratory:  [  ]Clear to auscultation  [ x ] Rales   [  ] Rhonchi   [  ] Wheezing     [  ] Chest Tenderness      Cardiovascular:  [ x ] Reg S1 S2   [  ] Irreg S1 S2   [ x ]No Murmur  [  ] +ve Murmurs  [  ]Systolic [  ]Diastolic      Abdomen:  [ x ] Soft  [ x ] No tendrerness  [  ] Tenderness  [  ] Organomegaly  [  ] ABD Distention  [  ] Rigidity                       [ x ] No Regidity                       [ x ] No Rebound Tenderness  [  ] No Guarding Rigidity  [  ] Rebound Tenderness[  ] Guarding Rigidity                          [ x ]  +ve Bowel Sounds  [  ] Decreased Bowel Sounds    [  ] Absent Bowel Sounds                            Extremities: [  ] No edema [ x ] Edema  [  ] Clubbing   [  ] Cyanosis                         [ x ] No Tender Calf muscles  [  ] Tender Calf muscles                        [ x ] Palpable peripheral pulses    Neurological: [  ] Awake  [  ] Alert  [  ] Oriented  x                             [  ] Confused  [  ] Drowsy  [ x ] respond to painful stimuli  [  ] Unresponsive    Skin:  [  ] Intact [  ] Redness [  ] Thrombophlebitis  [  ] Rashes  [  ] Dry  [ x ] Right hip surgical draining wound    Ortho:  [  ] Joint Swelling  [  ] Joint erythema [  ] Joint tenderness                [  ] Increased temp. to touch  [  ] DJD [ x ] WNL          LABS/DIAGNOSTIC TESTS                          8.4    6.5   )-----------( 193      ( 16 Apr 2018 04:50 )             25.3         04-16    133<L>  |  98  |  29<H>  ----------------------------<  191<H>  4.3   |  26  |  3.09<H>    Ca    7.2<L>      16 Apr 2018 04:50  Phos  4.2     04-16  Mg     1.9     04-16    TPro  5.6<L>  /  Alb  2.4<L>  /  TBili  0.3  /  DBili  x   /  AST  26  /  ALT  15  /  AlkPhos  96  04-15      LIVER FUNCTIONS - ( 15 Apr 2018 03:08 )  Alb: 2.4 g/dL / Pro: 5.6 g/dL / ALK PHOS: 96 U/L / ALT: 15 U/L DA / AST: 26 U/L / GGT: x             CULTURES:   None Yet.    RADIOLOGY    CXR:    EXAM:  XR CHEST AP OR PA 1V                            PROCEDURE DATE:  04/15/2018          INTERPRETATION:  PORTABLE CHEST X-RAY    HISTORY: s/p intubation, confirm placement.    COMPARISON: 4/15/2018.    FINDINGS/  IMPRESSION:  Interval intubation with ET tube tip approximately 3.3 cm above the   riaz. No pneumothorax.    Pulmonary vascular congestion, worsening bilateral perihilar airspace   opacities. Small bilateral pleural effusions are unchanged.    The cardiac silhouette is not accurately assessed by AP technique. Aortic   calcifications. Sternotomy wires.        EXAM:  CT BRAIN                            PROCEDURE DATE:  04/15/2018          INTERPRETATION:  CLINICAL INFORMATION: Altered mental status.    COMPARISON: None available.    PROCEDURE:   Noncontrast CT of the Head was performed from the skull base to the   vertex. Coronal and Sagittal reformats were obtained.    FINDINGS:    There is no acute intracranial hemorrhage, mass effect, midline shift,   extra-axial collection, hydrocephalus, or evidence of acute vascular   territorial infarction. Mild patchy hypodensities within the   periventricular and subcortical white matter, although nonspecific,   likely reflect chronic microvascular disease. Cerebral volume loss   results in prominence of the ventricles and sulci.    The visualized paranasal sinuses and mastoid air cells are clear.   Visualized osseous structures are intact.    IMPRESSION:     No acute intracranial hemorrhage, mass effect, or evidence of acute   vascular territorial infarction.    If clinical symptoms persist or worsen, short-term repeat CT head or more   sensitive evaluation with brain MRI may be obtained, if no   contraindications exist.          Assessment and Recommendation:   89yo male from home, lives with wife, no home attendant, PMH CAD s/p CABG (12-13 years ago), s/p left hip pinning for left hip fracture (8 years ago), DM, HTN, BPH with urinary retention p/w worsening shortness of breath and altered mental status. History and ROS obtained from son (Esteban [phone], at bedside).  Patient was recently admitted to [facility] from 3/29 to 4/6/2018 for right hip fracture after mechanical fall.   Patient was intubated and admitted to ICU and was given Vancomycin and Cefepime.    Problem/Recommendation - 1:  Problem: Pneumonia, bacterial.   Recommendation:   1- UA & CS.  2- Follow Blood culture to final report.  3- Continue Vancomycin, but give Vancomycin 500 mg IVPB q day as trough is < 20.  4- Continue IV Cefepime 1 gm IVPB q day.  5- Fluid and electrolytes management.  6- CBC and BMP follow up.   7- Follow Vancomycin trough closely and adjust Vancomycin dose accordingly.    Problem/Recommendation - 2:  ·  Problem: Postoperative wound infection, sequela.    Recommendation:   1- Orthopedic evaluation, and follow up.  2- MRI, or CT right hip when Feasible.  3- ESR and CRP.  4- Wound care right hip.     Problem/Recommendation - 3:  ·  Problem: Type 2 diabetes mellitus with stage 3 chronic kidney disease, unspecified long term insulin use status.    Recommendation:   1- Blood sugar monitoring and control.  2- Accu-Cheks with coverage.  3- 1800 karolyn ADA diet.  4- Follow HB A1C.     Problem/Recommendation - 4:  ·  Problem: Essential hypertension.    Recommendation:   1- Monitor Blood pressure closely.  2- Blood pressure control.  3- BP. meds as per cardiology and primary care team.     Discussed with medical resident.

## 2018-04-16 NOTE — PROGRESS NOTE ADULT - SUBJECTIVE AND OBJECTIVE BOX
Pt seen and evaluated in ICU now awake and alert and extubated.  Little pain right hip.  Abduction pillow in place.    PE: Right leg abducted with abduction pillow in place.       Right leg in good alignment.       Moving toes with good sensation.    A/P: Right hip hemiarthroplasty for femoral neck fracture.         Prosthetic posterior dislocation s/p reduction 4/15         For PT ambulation WBAT with strict hip precautions as medical condition allows.         Discussed with pt.

## 2018-04-16 NOTE — CHART NOTE - NSCHARTNOTEFT_GEN_A_CORE
Patient was put on CPAP trial at 9AM and tolerating well and alert and awake off sedation.   Repeat ABG on CPAP after 2 hour is  ABG - ( 16 Apr 2018 11:15 )  pH: 7.44  /  pCO2: 38    /  pO2: 102   / HCO3: 26    / Base Excess: 1.8   /  SaO2: 98        Pt extubated at 11am  Vitals stable, no tachypnea and pt can pull enough tidal volume.   Rapid shallow breathing index within normal limit.

## 2018-04-16 NOTE — CONSULT NOTE ADULT - ATTENDING COMMENTS
pt seen ,examined and chart reviewed  pt with ewelina on ckd, admitted for resp.failure/chf/fluid overload/s/p hip replacement, doing well today ,s/p extubation with good u/o on high dose diuretics  All vitals, meds and labs reviewed with resident  rest as per above renal resident note

## 2018-04-16 NOTE — PHYSICAL THERAPY INITIAL EVALUATION ADULT - PASSIVE RANGE OF MOTION EXAMINATION, REHAB EVAL
hip precautions on Right LE/bilateral upper extremity Passive ROM was WNL (within normal limits)/bilateral lower extremity Passive ROM was WFL (within functional limits)

## 2018-04-16 NOTE — PHYSICAL THERAPY INITIAL EVALUATION ADULT - PERTINENT HX OF CURRENT PROBLEM, REHAB EVAL
hypercapnic respiratory failure 2/2 hypoventilation, posterior dislocation of Right hip prosthesis s/p reduction.

## 2018-04-16 NOTE — PROGRESS NOTE ADULT - SUBJECTIVE AND OBJECTIVE BOX
INTERVAL HPI/ OVERNIGHT EVENTS: no major event overnight.     PRESSORS: [ ] YES [x ] NO  WHICH:        Antimicrobial:  cefepime  IVPB 1000 milliGRAM(s) IV Intermittent every 24 hours  cefepime  IVPB      vancomycin  IVPB 500 milliGRAM(s) IV Intermittent every 24 hours    Cardiovascular:  ATENolol  Tablet 25 milliGRAM(s) Oral daily  isosorbide   mononitrate ER Tablet (IMDUR) 30 milliGRAM(s) Oral daily  tamsulosin 0.4 milliGRAM(s) Oral at bedtime    Pulmonary:    Hematalogic:  aspirin  chewable 81 milliGRAM(s) Oral daily  clopidogrel Tablet 75 milliGRAM(s) Oral daily  heparin  Injectable 5000 Unit(s) SubCutaneous every 8 hours    Other:  atorvastatin 40 milliGRAM(s) Oral at bedtime  citalopram 20 milliGRAM(s) Oral daily  collagenase Ointment 1 Application(s) Topical daily  finasteride 5 milliGRAM(s) Oral daily  HYDROmorphone  Injectable 1 milliGRAM(s) IV Push every 4 hours PRN  insulin lispro (HumaLOG) corrective regimen sliding scale   SubCutaneous every 6 hours  pantoprazole  Injectable 40 milliGRAM(s) IV Push daily  propofol Infusion 5 MICROgram(s)/kG/Min IV Continuous <Continuous>    aspirin  chewable 81 milliGRAM(s) Oral daily  ATENolol  Tablet 25 milliGRAM(s) Oral daily  atorvastatin 40 milliGRAM(s) Oral at bedtime  cefepime  IVPB 1000 milliGRAM(s) IV Intermittent every 24 hours  cefepime  IVPB      citalopram 20 milliGRAM(s) Oral daily  clopidogrel Tablet 75 milliGRAM(s) Oral daily  collagenase Ointment 1 Application(s) Topical daily  finasteride 5 milliGRAM(s) Oral daily  heparin  Injectable 5000 Unit(s) SubCutaneous every 8 hours  HYDROmorphone  Injectable 1 milliGRAM(s) IV Push every 4 hours PRN  insulin lispro (HumaLOG) corrective regimen sliding scale   SubCutaneous every 6 hours  isosorbide   mononitrate ER Tablet (IMDUR) 30 milliGRAM(s) Oral daily  pantoprazole  Injectable 40 milliGRAM(s) IV Push daily  propofol Infusion 5 MICROgram(s)/kG/Min IV Continuous <Continuous>  tamsulosin 0.4 milliGRAM(s) Oral at bedtime  vancomycin  IVPB 500 milliGRAM(s) IV Intermittent every 24 hours    Drug Dosing Weight  Height (cm): 167.6 (15 Apr 2018 08:08)  Weight (kg): 67 (15 Apr 2018 07:00)  BMI (kg/m2): 23.9 (15 Apr 2018 08:08)  BSA (m2): 1.76 (15 Apr 2018 08:08)    CENTRAL LINE: [ ] YES [x ] NO  LOCATION:   DATE INSERTED:  REMOVE: [ ] YES [ ] NO  EXPLAIN:    SEPULVEDA: [x ] YES [] NO    DATE INSERTED: 4/15  REMOVE:  [ ] YES [ ] NO  EXPLAIN:    A-LINE:  [ ] YES [x ] NO  LOCATION:   DATE INSERTED:  REMOVE:  [ ] YES [ ] NO  EXPLAIN:    PMH -reviewed admission note, no change since admission      ICU Vital Signs Last 24 Hrs  T(C): 36.4 (2018 06:00), Max: 36.4 (15 Apr 2018 19:39)  T(F): 97.5 (2018 06:00), Max: 97.6 (15 Apr 2018 19:39)  HR: 60 (2018 07:00) (57 - 70)  BP: 108/47 (2018 07:00) (92/40 - 135/56)  BP(mean): 62 (2018 07:00) (54 - 72)  ABP: --  ABP(mean): --  RR: 14 (2018 07:00) (12 - 19)  SpO2: 100% (2018 07:00) (98% - 100%)      ABG - ( 15 Apr 2018 04:45 )  pH: 7.47  /  pCO2: 34    /  pO2: 192   / HCO3: 24    / Base Excess: 1.1   /  SaO2: >99         ROS  [ x ] UNABLE TO ELICIT          04-15 @ 07:01  -  -16 @ 07:00  --------------------------------------------------------  IN: 672.3 mL / OUT: 1311 mL / NET: -638.7 mL        Mode: AC/ CMV (Assist Control/ Continuous Mandatory Ventilation)  RR (machine): 14  TV (machine): 450  FiO2: 70  PEEP: 6  ITime: 1  MAP: 11  PIP: 25      Constitutional:    HEENT: [  ] Wnl  [  ] Pharyngeal congestion [ x x] ET and NGT in place.    Neck:  [ x ] Supple  [  ]Lymphadenopathy  [ x ] No JVD   [  ] JVD  [  ] Masses   [  ] WNL    CHEST/Respiratory:  [  ]Clear to auscultation  [ x ] Rales   [  ] Rhonchi   [  ] Wheezing     [  ] Chest Tenderness      Cardiovascular:  [ x ] Reg S1 S2   [  ] Irreg S1 S2   [ x ]No Murmur  [  ] +ve Murmurs  [  ]Systolic [  ]Diastolic      Abdomen:  [ x ] Soft  [ x ] No tendrerness  [  ] Tenderness  [  ] Organomegaly  [  ] ABD Distention  [  ] Rigidity                       [ x ] No Regidity                       [ x ] No Rebound Tenderness  [  ] No Guarding Rigidity  [  ] Rebound Tenderness[  ] Guarding Rigidity                          [ x ]  +ve Bowel Sounds  [  ] Decreased Bowel Sounds    [  ] Absent Bowel Sounds                            Extremities: [  ] No edema [ x ] Edema  [  ] Clubbing   [  ] Cyanosis                         [ x ] No Tender Calf muscles  [  ] Tender Calf muscles                        [ x ] Palpable peripheral pulses    Neurological: [  ] Awake  [  ] Alert  [  ] Oriented  x                             [  ] Confused  [  ] Drowsy  [ x ] respond to painful stimuli  [  ] Unresponsive    Skin:  [  ] Intact [  ] Redness [  ] Thrombophlebitis  [  ] Rashes  [  ] Dry  [ x ] Right hip surgical draining wound    Ortho:  [  ] Joint Swelling  [  ] Joint erythema [  ] Joint tenderness                [  ] Increased temp. to touch  [  ] DJD [ x ] WNL        LABS:  CBC Full  -  ( 2018 04:50 )  WBC Count : 6.5 K/uL  Hemoglobin : 8.4 g/dL  Hematocrit : 25.3 %  Platelet Count - Automated : 193 K/uL  Mean Cell Volume : 93.5 fl  Mean Cell Hemoglobin : 31.1 pg  Mean Cell Hemoglobin Concentration : 33.3 gm/dL  Auto Neutrophil # : 5.0 K/uL  Auto Lymphocyte # : 0.9 K/uL  Auto Monocyte # : 0.4 K/uL  Auto Eosinophil # : 0.1 K/uL  Auto Basophil # : 0.1 K/uL  Auto Neutrophil % : 77.2 %  Auto Lymphocyte % : 14.2 %  Auto Monocyte % : 6.3 %  Auto Eosinophil % : 1.5 %  Auto Basophil % : 0.8 %        133<L>  |  98  |  29<H>  ----------------------------<  191<H>  4.3   |  26  |  3.09<H>    Ca    7.2<L>      2018 04:50  Phos  4.2     04-16  Mg     1.9     -16    TPro  5.6<L>  /  Alb  2.4<L>  /  TBili  0.3  /  DBili  x   /  AST  26  /  ALT  15  /  AlkPhos  96  04-15      Urinalysis Basic - ( 15 Apr 2018 04:06 )    Color: Yellow / Appearance: Clear / S.005 / pH: x  Gluc: x / Ketone: Negative  / Bili: Negative / Urobili: 4   Blood: x / Protein: 100 / Nitrite: Negative   Leuk Esterase: Trace / RBC: 2-5 /HPF / WBC 3-5 /HPF   Sq Epi: x / Non Sq Epi: Few /HPF / Bacteria: Trace /HPF        [x ]GOALS OF CARE DISCUSSION WITH PATIENT/FAMILY/PROXY:    CRITICAL CARE TIME SPENT: 35 minutes INTERVAL HPI/ OVERNIGHT EVENTS: no major event overnight.   fio2 and peep tapered, pt alert, responsive, cap trial done this am, noted good TV > 500 RR < 16, alert responsive  ABG obtained:  ABG - ( 2018 11:15 )  pH: 7.44  /  pCO2: 38    /  pO2: 102   / HCO3: 26    / Base Excess: 1.8   /  SaO2: 98          patient was extubated   pt comfortable.    PRESSORS: [ ] YES [x ] NO  WHICH:        Antimicrobial:  cefepime  IVPB 1000 milliGRAM(s) IV Intermittent every 24 hours  cefepime  IVPB      vancomycin  IVPB 500 milliGRAM(s) IV Intermittent every 24 hours    Cardiovascular:  ATENolol  Tablet 25 milliGRAM(s) Oral daily  isosorbide   mononitrate ER Tablet (IMDUR) 30 milliGRAM(s) Oral daily  tamsulosin 0.4 milliGRAM(s) Oral at bedtime    Pulmonary:    Hematalogic:  aspirin  chewable 81 milliGRAM(s) Oral daily  clopidogrel Tablet 75 milliGRAM(s) Oral daily  heparin  Injectable 5000 Unit(s) SubCutaneous every 8 hours    Other:  atorvastatin 40 milliGRAM(s) Oral at bedtime  citalopram 20 milliGRAM(s) Oral daily  collagenase Ointment 1 Application(s) Topical daily  finasteride 5 milliGRAM(s) Oral daily  HYDROmorphone  Injectable 1 milliGRAM(s) IV Push every 4 hours PRN  insulin lispro (HumaLOG) corrective regimen sliding scale   SubCutaneous every 6 hours  pantoprazole  Injectable 40 milliGRAM(s) IV Push daily  propofol Infusion 5 MICROgram(s)/kG/Min IV Continuous <Continuous>    aspirin  chewable 81 milliGRAM(s) Oral daily  ATENolol  Tablet 25 milliGRAM(s) Oral daily  atorvastatin 40 milliGRAM(s) Oral at bedtime  cefepime  IVPB 1000 milliGRAM(s) IV Intermittent every 24 hours  cefepime  IVPB      citalopram 20 milliGRAM(s) Oral daily  clopidogrel Tablet 75 milliGRAM(s) Oral daily  collagenase Ointment 1 Application(s) Topical daily  finasteride 5 milliGRAM(s) Oral daily  heparin  Injectable 5000 Unit(s) SubCutaneous every 8 hours  HYDROmorphone  Injectable 1 milliGRAM(s) IV Push every 4 hours PRN  insulin lispro (HumaLOG) corrective regimen sliding scale   SubCutaneous every 6 hours  isosorbide   mononitrate ER Tablet (IMDUR) 30 milliGRAM(s) Oral daily  pantoprazole  Injectable 40 milliGRAM(s) IV Push daily  propofol Infusion 5 MICROgram(s)/kG/Min IV Continuous <Continuous>  tamsulosin 0.4 milliGRAM(s) Oral at bedtime  vancomycin  IVPB 500 milliGRAM(s) IV Intermittent every 24 hours    Drug Dosing Weight  Height (cm): 167.6 (15 Apr 2018 08:08)  Weight (kg): 67 (15 Apr 2018 07:00)  BMI (kg/m2): 23.9 (15 Apr 2018 08:08)  BSA (m2): 1.76 (15 Apr 2018 08:08)    CENTRAL LINE: [ ] YES [x ] NO  LOCATION:   DATE INSERTED:  REMOVE: [ ] YES [ ] NO  EXPLAIN:    SEPULVEDA: [x ] YES [] NO    DATE INSERTED: 4/15  REMOVE:  [ ] YES [ ] NO  EXPLAIN:    A-LINE:  [ ] YES [x ] NO  LOCATION:   DATE INSERTED:  REMOVE:  [ ] YES [ ] NO  EXPLAIN:    PMH -reviewed admission note, no change since admission      ICU Vital Signs Last 24 Hrs  T(C): 36.4 (2018 06:00), Max: 36.4 (15 Apr 2018 19:39)  T(F): 97.5 (2018 06:00), Max: 97.6 (15 Apr 2018 19:39)  HR: 60 (2018 07:00) (57 - 70)  BP: 108/47 (2018 07:00) (92/40 - 135/56)  BP(mean): 62 (2018 07:00) (54 - 72)  ABP: --  ABP(mean): --  RR: 14 (2018 07:00) (12 - 19)  SpO2: 100% (2018 07:00) (98% - 100%)      ABG - ( 15 Apr 2018 04:45 )  pH: 7.47  /  pCO2: 34    /  pO2: 192   / HCO3: 24    / Base Excess: 1.1   /  SaO2: >99         ROS  [ x ] UNABLE TO ELICIT          -15 @ 07:01  -  04-16 @ 07:00  --------------------------------------------------------  IN: 672.3 mL / OUT: 1311 mL / NET: -638.7 mL        Mode: AC/ CMV (Assist Control/ Continuous Mandatory Ventilation)  RR (machine): 14  TV (machine): 450  FiO2: 70  PEEP: 6  ITime: 1  MAP: 11  PIP: 25      Constitutional:    HEENT: [  ] Wnl  [  ] Pharyngeal congestion [ x x] ET and NGT in place.    Neck:  [ x ] Supple  [  ]Lymphadenopathy  [ x ] No JVD   [  ] JVD  [  ] Masses   [  ] WNL    CHEST/Respiratory:  [  ]Clear to auscultation  [ x ] Rales   [  ] Rhonchi   [  ] Wheezing     [  ] Chest Tenderness      Cardiovascular:  [ x ] Reg S1 S2   [  ] Irreg S1 S2   [ x ]No Murmur  [  ] +ve Murmurs  [  ]Systolic [  ]Diastolic      Abdomen:  [ x ] Soft  [ x ] No tendrerness  [  ] Tenderness  [  ] Organomegaly  [  ] ABD Distention  [  ] Rigidity                       [ x ] No Regidity                       [ x ] No Rebound Tenderness  [  ] No Guarding Rigidity  [  ] Rebound Tenderness[  ] Guarding Rigidity                          [ x ]  +ve Bowel Sounds  [  ] Decreased Bowel Sounds    [  ] Absent Bowel Sounds                            Extremities: [  ] No edema [ x ] Edema  [  ] Clubbing   [  ] Cyanosis                         [ x ] No Tender Calf muscles  [  ] Tender Calf muscles                        [ x ] Palpable peripheral pulses    Neurological: [  ] Awake  [  ] Alert  [  ] Oriented  x                             [  ] Confused  [  ] Drowsy  [ x ] respond to painful stimuli  [  ] Unresponsive    Skin:  [  ] Intact [  ] Redness [  ] Thrombophlebitis  [  ] Rashes  [  ] Dry  [ x ] Right hip surgical draining wound    Ortho:  [  ] Joint Swelling  [  ] Joint erythema [  ] Joint tenderness                [  ] Increased temp. to touch  [  ] DJD [ x ] WNL        LABS:  CBC Full  -  ( 2018 04:50 )  WBC Count : 6.5 K/uL  Hemoglobin : 8.4 g/dL  Hematocrit : 25.3 %  Platelet Count - Automated : 193 K/uL  Mean Cell Volume : 93.5 fl  Mean Cell Hemoglobin : 31.1 pg  Mean Cell Hemoglobin Concentration : 33.3 gm/dL  Auto Neutrophil # : 5.0 K/uL  Auto Lymphocyte # : 0.9 K/uL  Auto Monocyte # : 0.4 K/uL  Auto Eosinophil # : 0.1 K/uL  Auto Basophil # : 0.1 K/uL  Auto Neutrophil % : 77.2 %  Auto Lymphocyte % : 14.2 %  Auto Monocyte % : 6.3 %  Auto Eosinophil % : 1.5 %  Auto Basophil % : 0.8 %    -16    133<L>  |  98  |  29<H>  ----------------------------<  191<H>  4.3   |  26  |  3.09<H>    Ca    7.2<L>      2018 04:50  Phos  4.2     -16  Mg     1.9     -16    TPro  5.6<L>  /  Alb  2.4<L>  /  TBili  0.3  /  DBili  x   /  AST  26  /  ALT  15  /  AlkPhos  96  04-15      Urinalysis Basic - ( 15 Apr 2018 04:06 )    Color: Yellow / Appearance: Clear / S.005 / pH: x  Gluc: x / Ketone: Negative  / Bili: Negative / Urobili: 4   Blood: x / Protein: 100 / Nitrite: Negative   Leuk Esterase: Trace / RBC: 2-5 /HPF / WBC 3-5 /HPF   Sq Epi: x / Non Sq Epi: Few /HPF / Bacteria: Trace /HPF        [x ]GOALS OF CARE DISCUSSION WITH PATIENT/FAMILY/PROXY:    CRITICAL CARE TIME SPENT: 35 minutes

## 2018-04-16 NOTE — CONSULT NOTE ADULT - SUBJECTIVE AND OBJECTIVE BOX
PULMONARY-INTERNAL MEDICINE CONSULT NOTE      MARCELLA NIEVES  MRN-[id number]    Patient is a 89y old  Male who presents with a chief complaint of resp distress (15 Apr 2018 06:00)      HISTORY OF PRESENT ILLNESS:  89 Y/O M, S/P RECENT RT HIP HEMIARTHROPLASTY, ADMITTED WITH CHANGE IN MENTAL STATUS, DRAINAGE FROM WOUND AND INTUBATED FOR PNEUMONIA/SEPSIS. SEEN BY ORTHO FOR POSTERIOR DISLOCATION OF HIP WHICH WAS REDUCED.THE PT IS NOW EXTUBATED.  PMH; CAD-S/P CABG,DM,HTN,BPH,HLD    MEDICATIONS  (STANDING):  aspirin  chewable 81 milliGRAM(s) Oral daily  ATENolol  Tablet 25 milliGRAM(s) Oral daily  atorvastatin 40 milliGRAM(s) Oral at bedtime  cefepime  IVPB 1000 milliGRAM(s) IV Intermittent every 24 hours  cefepime  IVPB      citalopram 20 milliGRAM(s) Oral daily  clopidogrel Tablet 75 milliGRAM(s) Oral daily  collagenase Ointment 1 Application(s) Topical daily  finasteride 5 milliGRAM(s) Oral daily  furosemide   Injectable 60 milliGRAM(s) IV Push every 12 hours  heparin  Injectable 5000 Unit(s) SubCutaneous every 8 hours  insulin lispro (HumaLOG) corrective regimen sliding scale   SubCutaneous every 6 hours  isosorbide   mononitrate ER Tablet (IMDUR) 30 milliGRAM(s) Oral daily  pantoprazole  Injectable 40 milliGRAM(s) IV Push daily  tamsulosin 0.4 milliGRAM(s) Oral at bedtime  vancomycin  IVPB 500 milliGRAM(s) IV Intermittent every 24 hours    MEDICATIONS  (PRN):  HYDROmorphone  Injectable 1 milliGRAM(s) IV Push every 4 hours PRN Severe Pain (7 - 10)      Allergies    No Known Allergies            PAST MEDICAL & SURGICAL HISTORY:  HLD (hyperlipidemia)  Hip fracture, left  CAD (coronary artery disease): s/p by pass surgery  Diabetes  Hypertension  S/P CABG (coronary artery bypass graft)  History of hip surgery      FAMILY HISTORY:  No pertinent family history in first degree relatives      SOCIAL HISTORY  Smoking History:     REVIEW OF SYSTEMS:    CONSTITUTIONAL:  No fevers, chills, sweats    HEENT:  Eyes:  No diplopia or blurred vision. ENT:  No earache, sore throat or runny nose.    CARDIOVASCULAR:  No pressure, squeezing, tightness, or heaviness about the chest; no palpitations.    RESPIRATORY:  Per HPI    GASTROINTESTINAL:  No abdominal pain, nausea, vomiting or diarrhea.    GENITOURINARY:  No dysuria, frequency or urgency.    NEUROLOGIC:  No paresthesias, fasciculations, seizures or weakness.    PSYCHIATRIC:  No disorder of thought or mood.    Vital Signs Last 24 Hrs  T(C): 36.4 (2018 12:00), Max: 36.4 (15 Apr 2018 19:39)  T(F): 97.6 (2018 12:00), Max: 97.6 (15 Apr 2018 19:39)  HR: 66 (2018 12:00) (57 - 71)  BP: 119/55 (2018 12:00) (92/40 - 144/60)  BP(mean): 69 (:) (54 - 81)  RR: 14 (2018 12:00) (11 - 19)  SpO2: 100% (2018 12:00) (98% - 100%)  I&O's Detail    15 Apr 2018 07:01  -  2018 07:00  --------------------------------------------------------  IN:    Glucerna: 300 mL    IV PiggyBack: 100 mL    propofol Infusion: 272.3 mL  Total IN: 672.3 mL    OUT:    Indwelling Catheter - Urethral: 1310 mL    Stool: 1 mL  Total OUT: 1311 mL    Total NET: -638.7 mL      2018 07:01  -  2018 12:09  --------------------------------------------------------  IN:    Glucerna: 40 mL    propofol Infusion: 10.1 mL    Solution: 100 mL  Total IN: 150.1 mL    OUT:    Indwelling Catheter - Urethral: 560 mL  Total OUT: 560 mL    Total NET: -409.9 mL          PHYSICAL EXAMINATION:    GENERAL: The patient is a well-developed, well-nourished MALE in no apparent distress.     HEENT: Head is normocephalic and atraumatic. Extraocular muscles are intact. Mucous membranes are moist. NGT    NECK: Supple.     LUNGS: Clear to auscultation without wheezing, rales, or rhonchi. Respirations unlabored    HEART: Regular rate and rhythm without murmur.    ABDOMEN: Soft, nontender, and nondistended.  No hepatosplenomegaly is noted.    EXTREMITIES: Without any cyanosis, clubbing, rash, lesions or edema.    NEUROLOGIC: Grossly intact.      LABS:                        8.4    6.5   )-----------( 193      ( 2018 04:50 )             25.3     04-16    133<L>  |  98  |  29<H>  ----------------------------<  191<H>  4.3   |  26  |  3.09<H>    Ca    7.2<L>      2018 04:50  Phos  4.2     -16  Mg     1.9     -16    TPro  5.6<L>  /  Alb  2.4<L>  /  TBili  0.3  /  DBili  x   /  AST  26  /  ALT  15  /  AlkPhos  96  04-15      Urinalysis Basic - ( 15 Apr 2018 04:06 )    Color: Yellow / Appearance: Clear / S.005 / pH: x  Gluc: x / Ketone: Negative  / Bili: Negative / Urobili: 4   Blood: x / Protein: 100 / Nitrite: Negative   Leuk Esterase: Trace / RBC: 2-5 /HPF / WBC 3-5 /HPF   Sq Epi: x / Non Sq Epi: Few /HPF / Bacteria: Trace /HPF      ABG - ( 2018 11:15 )  pH: 7.44  /  pCO2: 38    /  pO2: 102   / HCO3: 26    / Base Excess: 1.8   /  SaO2: 98                CARDIAC MARKERS ( 15 Apr 2018 09:27 )  0.176 ng/mL / x     / 102 U/L / x     / 3.2 ng/mL  CARDIAC MARKERS ( 15 Apr 2018 03:08 )  0.121 ng/mL / x     / 115 U/L / x     / 3.4 ng/mL        Serum Pro-Brain Natriuretic Peptide: 44814 pg/mL (18 @ 04:50)          MICROBIOLOGY:    RADIOLOGY & ADDITIONAL STUDIES:    CXR:  EXAM:  XR CHEST PORTABLE IMMED 1V                            PROCEDURE DATE:  2018          INTERPRETATION:  AP semierect chest on 2018 at 11:09 AM.   Patient is respirator dependent.    Heart is magnified by technique.    Endotracheal tube and sternotomy again noted.    Nasogastric tube noted. Tip enters the stomach.    There are mid and lower lung field infiltrates likely with some   associated left lower lobe atelectasis.    These lung and pleural findings suggest some improvement from April 15.    IMPRESSION: There is some improvement in chest findings.

## 2018-04-16 NOTE — CONSULT NOTE ADULT - ASSESSMENT
-S/P RESPIRATORY FAILURE 2/2 PN(HCAP)  -WOUND INFECTION  -S/P POSTERIOR DISLOCATION OF HIP  -HX; CAD/CABG,DM,HTN,HLD,BPH,S/P ORIF RT HIP 3/30/18    PLAN; ANTIBX/02           DVT PPX           ID/ORTHO F/U -S/P RESPIRATORY FAILURE 2/2 PN(HCAP)/CHF  -WOUND INFECTION  -S/P POSTERIOR DISLOCATION OF HIP  -ACUTE ON CKD  -HX; CAD/CABG,DM,HTN,HLD,BPH,S/P ORIF RT HIP 3/30/18    PLAN; ANTIBX/02           DVT PPX           ID/ORTHO/RENAL F/U           PROCALCITONIN

## 2018-04-16 NOTE — CONSULT NOTE ADULT - ASSESSMENT
A/P:    1) LOBITO on CKD  -obtain urine lytes  likely 2/2 UTI, pre-renal, with diabetes and hypertensive disease  -c/w hannon  -on lasix 60iv BID for congestion on CXR and pro bnp 30,000 and vanco for ?hip infection  -monitor vanc trough  -holding iv fluids  Cr 2.91 --> 3.09 with GFR stage 4 CKD- 17  baseline Cr ~3  monitor daily bmp  2) HTN  -on atenolol and imdur  monitor BP  3) BPH  -c/w flomax and proscar A/P:    1) LOBITO on CKD  -obtain urine lytes  likely 2/2 UTI, pre-renal, with diabetes and hypertensive disease  -c/w hannon  -on lasix 60iv BID for congestion on CXR and pro bnp 30,000 and vanco for ?hip infection  -monitor vanc trough  -holding iv fluids  Cr 2.91 --> 3.09 with GFR stage 4 CKD- 17  baseline Cr ~3  -Avoid Nephrotoxic Meds/ Agents such as (NSAIDs, IV contrast, Aminoglycosides such as gentamicin, -Gadolinium contrast, Phosphate containing enemas, etc..)  -Adjust Medications according to eGFR  -f/u bmp daily  -f/u I/O s daily  2) HTN  -on atenolol and imdur  monitor BP  3) BPH  -c/w flomax and proscar A/P:    1) LOBITO on CKD  -obtain urine lytes  likely 2/2 UTI, pre-renal, with diabetes and hypertensive disease  -c/w hannon  -on lasix 60iv BID for congestion on CXR and pro bnp 30,000 and vanco for ?hip infection  -monitor vanc trough  -holding iv fluids  Cr 2.91 --> 3.09 with GFR stage 4 CKD- 17  baseline Cr ~3  -Avoid Nephrotoxic Meds/ Agents such as (NSAIDs, IV contrast, Aminoglycosides such as gentamicin, -Gadolinium contrast, Phosphate containing enemas, etc..)  -Adjust Medications according to eGFR  -f/u bmp daily  -f/u I/O s daily  -obtain PTH and phosphorus level, obtain urine protein/Cr ratio and renal sono  2) HTN  -on atenolol and imdur  monitor BP  3) BPH  -c/w flomax and proscar A/P:    1) LOBITO on CKD(stage 4 ?)  likely  pre-renal, with underlying ckd from diabetes and hypertensive disease  -pt is clinically volume overloaded on lasix 60iv BID for congestion on CXR and pro bnp 30,000 and vanco for ?hip infection  -monitor vanc trough  -holding iv fluids  Cr 2.91 --> 3.09 with GFR stage 4 CKD- 17  baseline Cr ~3  -Avoid Nephrotoxic Meds/ Agents such as (NSAIDs, IV contrast, Aminoglycosides such as gentamicin, -Gadolinium contrast, Phosphate containing enemas, etc..)  -Adjust Medications according to eGFR  -f/u bmp daily  -f/u I/O s daily  -obtain PTH and phosphorus level, obtain urine protein/Cr ratio and renal sono  2) HTN  -on atenolol and imdur  monitor BP  -keep bp<140/80  -add low salt diet  3) UTI: on iv antibiotics  4) ANEMIA:MF  -F/u Hb daily  -add epogen if anemia w/u c/w ckd  5)HYPONATREMIA:mild, mf like ckd/hypervolemia  -f/u Na level  daily

## 2018-04-16 NOTE — PROGRESS NOTE ADULT - ASSESSMENT
89yo male from home, lives with wife, no home attendant, PMH CAD s/p CABG (12-13 years ago), s/p left hip pinning for left hip fracture (8 years ago), DM, HTN, BPH with urinary retention p/w worsening shortness of breath and altered mental status.  Intubated for hypercapneic respiratory failure 2/2 hypoventilation 2/2 likely aspiration pna vs pulmonary edema

## 2018-04-16 NOTE — DIETITIAN INITIAL EVALUATION ADULT. - PROBLEM SELECTOR PLAN 5
target /80  no hypotension noted   c/w isosorbide for afterload reduction in context of possible CHF exacerbation

## 2018-04-16 NOTE — PHYSICAL THERAPY INITIAL EVALUATION ADULT - ACTIVE RANGE OF MOTION EXAMINATION, REHAB EVAL
bilateral  lower extremity Active ROM was WFL (within functional limits)/hip precautions on Right LE/renaldo. upper extremity Active ROM was WNL (within normal limits)

## 2018-04-16 NOTE — DIETITIAN INITIAL EVALUATION ADULT. - PROBLEM SELECTOR PLAN 1
2/2 hypoventilation possibly 2/2 aspiration pna vs CHF exacerbation  new right pleural effusion and bilateral congestion   s/p 1 dose vancomycin and cefepime (for possible infected right hip incision)  - mechanical ventilation, propofol sedation  - follow up BNP, procalcitonin  - if BP tolerates, diurese

## 2018-04-16 NOTE — CONSULT NOTE ADULT - SUBJECTIVE AND OBJECTIVE BOX
Patient is a 89y Male whom presented to the hospital with urinary retention, SOB and AMS. Patient was seen and examined, on SBT on ventilator. Denies fever, chills, SOB, palpitations, chest pain, or GI complaints. Awake and alert.    HPI:  89yo male from home, lives with wife, no home attendant, PMH CAD s/p CABG (12-13 years ago), s/p left hip pinning for left hip fracture (8 years ago), DM, HTN, BPH with urinary retention p/w worsening shortness of breath and altered mental status. History and ROS obtained from son (Esteban [phone], at bedside).  Patient was recently admitted to [facility] from 3/29 to 2018 for right hip fracture after mechanical fall.  Patient underwent right hip arthroplasty on 3/30 with Dr Sosa, complicated by ileus and E coli UTI, and discharged on  to home.  Patient has been receiving home PT.  2 days ago, while walking with therapist, patient endorsed shortness of breath and wheezing, resolved after rest.  The next day, son called patient's wife and was told he was doing well.  This early morning at 1am, grandson noticed that patient in distress with worsening sob and was disoriented.  911 called.  Son states that patient did not endorse fever, headache, change in vision or hearing, sore throat, chest pain, vomiting, diarrhea, hematochezia or hematuria.  Last colonoscopy 7 years ago. Endorses occasional nausea and 'gagging' with food.     PAST MEDICAL & SURGICAL HISTORY:  HLD (hyperlipidemia)  Hip fracture, left  CAD (coronary artery disease): s/p by pass surgery  Diabetes  Hypertension  S/P CABG (coronary artery bypass graft)  History of hip surgery    Home Medications: Reviewed    MEDICATIONS  (STANDING):  aspirin  chewable 81 milliGRAM(s) Oral daily  ATENolol  Tablet 25 milliGRAM(s) Oral daily  atorvastatin 40 milliGRAM(s) Oral at bedtime  cefepime  IVPB 1000 milliGRAM(s) IV Intermittent every 24 hours  cefepime  IVPB      citalopram 20 milliGRAM(s) Oral daily  clopidogrel Tablet 75 milliGRAM(s) Oral daily  collagenase Ointment 1 Application(s) Topical daily  finasteride 5 milliGRAM(s) Oral daily  furosemide   Injectable 60 milliGRAM(s) IV Push every 12 hours  heparin  Injectable 5000 Unit(s) SubCutaneous every 8 hours  insulin lispro (HumaLOG) corrective regimen sliding scale   SubCutaneous every 6 hours  isosorbide   mononitrate ER Tablet (IMDUR) 30 milliGRAM(s) Oral daily  pantoprazole  Injectable 40 milliGRAM(s) IV Push daily  propofol Infusion 5 MICROgram(s)/kG/Min (2.01 mL/Hr) IV Continuous <Continuous>  tamsulosin 0.4 milliGRAM(s) Oral at bedtime  vancomycin  IVPB 500 milliGRAM(s) IV Intermittent every 24 hours    MEDICATIONS  (PRN):  HYDROmorphone  Injectable 1 milliGRAM(s) IV Push every 4 hours PRN Severe Pain (7 - 10)      Allergies    No Known Allergies    Intolerances        SOCIAL HISTORY:  Alcohol use [X ] No  [ ] Yes  Smoking  [ X] No  [ ] Yes  Drug Abuse [X ] No  [ ] Yes  Tattoo [X ] No  [ ] Yes  History of Blood transfusion [ X] No  [ ] Yes    FAMILY HISTORY:  No pertinent family history in first degree relatives      REVIEW OF SYSTEMS:  CONSTITUTIONAL: No weakness, fevers or chills  EYES/ENT: No visual changes;  No vertigo or throat pain   NECK: No pain or stiffness  RESPIRATORY: No cough, wheezing, hemoptysis; No shortness of breath  CARDIOVASCULAR: No chest pain or palpitations  GASTROINTESTINAL: No abdominal or epigastric pain. No nausea, vomiting, or hematemesis; No diarrhea or constipation. No melena or hematochezia.  GENITOURINARY: No dysuria, frequency or hematuria  NEUROLOGICAL: No numbness or weakness  SKIN: No itching, burning, rashes, or lesions   All other review of systems is negative unless indicated above    Vital Signs  T(F): 97.5 (18 @ 06:00), Max: 97.6 (04-15-18 @ 19:39)  HR: 68 (18 @ 10:00) (57 - 68)  BP: 144/60 (18 @ 10:00) (92/40 - 144/60)  ABP: --  RR: 12 (18 @ 10:00) (12 - 19)  SpO2: 100% (18 @ 10:00) (98% - 100%)  Wt(kg): --  CVP(cm H2O): --  CO: --  PCWP: --    I and O's:     @ 07:  -  04-15 @ 07:00  --------------------------------------------------------  IN:    IV PiggyBack: 250 mL    propofol Infusion: 8.8 mL  Total IN: 258.8 mL    OUT:    Voided: 125 mL  Total OUT: 125 mL    Total NET: 133.8 mL      04-15 @ 07: @ 07:00  --------------------------------------------------------  IN:    Glucerna: 300 mL    IV PiggyBack: 100 mL    propofol Infusion: 272.3 mL  Total IN: 672.3 mL    OUT:    Indwelling Catheter - Urethral: 1310 mL    Stool: 1 mL  Total OUT: 1311 mL    Total NET: -638.7 mL       @ 07: @ 11:18  --------------------------------------------------------  IN:    Glucerna: 40 mL    propofol Infusion: 10.1 mL    Solution: 100 mL  Total IN: 150.1 mL    OUT:    Indwelling Catheter - Urethral: 160 mL  Total OUT: 160 mL    Total NET: -9.9 mL        Daily     Daily Weight in k (2018 08:00)    PHYSICAL EXAM:  Constitutional: well developed, well nourished  and in nad  HEENT: PERRLA,  no icteric sclera and mild pallor of conjunctiva noted  Neck: No JVD, thyromegaly or adenopathy  Respiratory: reduced air entry lower lungs with no rales, wheezing or rhonchi; + vent  Cardiovascular: S1 and S2 normally heard  Gastrointestinal: soft, distended, nontender and normal bowel sounds heard, +NG tube  Extremities: No peripheral edema or cyanosis  Neurological: A/O x 3, no focal deficits  Psychiatric: Normal mood, normal affect  : No flank tenderness  Skin: No rashes        LABS:                        8.4    6.5   )-----------( 193      ( 2018 04:50 )             25.3     4.2  --        -    133<L>  |  98  |  29<H>  ----------------------------<  191<H>  4.3   |  26  |  3.09<H>  04-15    135  |  99  |  32<H>  ----------------------------<  96  4.3   |  25  |  2.91<H>    Ca    7.2<L>      2018 04:50  Ca    7.6<L>      15 Apr 2018 03:08  Phos  4.2     -16  Mg     1.9         TPro  5.6<L>  /  Alb  2.4<L>  /  TBili  0.3  /  DBili  x   /  AST  26  /  ALT  15  /  AlkPhos  96  04-15    URINE STUDIES:  Urinalysis Basic - ( 15 Apr 2018 04:06 )    Color: Yellow / Appearance: Clear / S.005 / pH: x  Gluc: x / Ketone: Negative  / Bili: Negative / Urobili: 4   Blood: x / Protein: 100 / Nitrite: Negative   Leuk Esterase: Trace / RBC: 2-5 /HPF / WBC 3-5 /HPF   Sq Epi: x / Non Sq Epi: Few /HPF / Bacteria: Trace /HPF    RADIOLOGY & ADDITIONAL STUDIES:    < from: Xray Pelvis AP only (04.15.18 @ 20:22) >  IMPRESSION: Posterior dislocation of right hipprosthesis with subsequent   relocation. No acute fracture.     < end of copied text > Patient is a 89y Male whom presented to the hospital with urinary retention, SOB and AMS. Patient was seen and examined, on SBT on ventilator. Denies fever, chills, SOB, palpitations, chest pain, or GI complaints. Awake and alert.    Medical HPI:  89yo male from home, lives with wife, no home attendant, PMH CAD s/p CABG (12-13 years ago), s/p left hip pinning for left hip fracture (8 years ago), DM, HTN, BPH with urinary retention p/w worsening shortness of breath and altered mental status. History and ROS obtained from son (Esteban [phone], at bedside).  Patient was recently admitted to [facility] from 3/29 to 2018 for right hip fracture after mechanical fall.  Patient underwent right hip arthroplasty on 3/30 with Dr Sosa, complicated by ileus and E coli UTI, and discharged on  to home.  Patient has been receiving home PT.  2 days ago, while walking with therapist, patient endorsed shortness of breath and wheezing, resolved after rest.  The next day, son called patient's wife and was told he was doing well.  This early morning at 1am, grandson noticed that patient in distress with worsening sob and was disoriented.  911 called.  Son states that patient did not endorse fever, headache, change in vision or hearing, sore throat, chest pain, vomiting, diarrhea, hematochezia or hematuria.  Last colonoscopy 7 years ago. Endorses occasional nausea and 'gagging' with food.     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 as per old labs  pt currently denies cp,sob ,skin rash ,leg edema or gu symptons  pt is having good u/o on diuretics    Home Medications: Reviewed    MEDICATIONS  (STANDING):  aspirin  chewable 81 milliGRAM(s) Oral daily  ATENolol  Tablet 25 milliGRAM(s) Oral daily  atorvastatin 40 milliGRAM(s) Oral at bedtime  cefepime  IVPB 1000 milliGRAM(s) IV Intermittent every 24 hours  cefepime  IVPB      citalopram 20 milliGRAM(s) Oral daily  clopidogrel Tablet 75 milliGRAM(s) Oral daily  collagenase Ointment 1 Application(s) Topical daily  finasteride 5 milliGRAM(s) Oral daily  furosemide   Injectable 60 milliGRAM(s) IV Push every 12 hours  heparin  Injectable 5000 Unit(s) SubCutaneous every 8 hours  insulin lispro (HumaLOG) corrective regimen sliding scale   SubCutaneous every 6 hours  isosorbide   mononitrate ER Tablet (IMDUR) 30 milliGRAM(s) Oral daily  pantoprazole  Injectable 40 milliGRAM(s) IV Push daily  propofol Infusion 5 MICROgram(s)/kG/Min (2.01 mL/Hr) IV Continuous <Continuous>  tamsulosin 0.4 milliGRAM(s) Oral at bedtime  vancomycin  IVPB 500 milliGRAM(s) IV Intermittent every 24 hours    MEDICATIONS  (PRN):  HYDROmorphone  Injectable 1 milliGRAM(s) IV Push every 4 hours PRN Severe Pain (7 - 10)      Allergies    No Known Allergies    Intolerances        SOCIAL HISTORY:  Alcohol use [X ] No  [ ] Yes  Smoking  [ X] No  [ ] Yes  Drug Abuse [X ] No  [ ] Yes  Tattoo [X ] No  [ ] Yes  History of Blood transfusion [ X] No  [ ] Yes    FAMILY HISTORY:  No pertinent family history in first degree relatives      REVIEW OF SYSTEMS:  CONSTITUTIONAL: No weakness, fevers or chills  EYES/ENT: No visual changes;  No vertigo or throat pain   NECK: No pain or stiffness  RESPIRATORY: No cough, wheezing, hemoptysis; No shortness of breath  CARDIOVASCULAR: No chest pain or palpitations  GASTROINTESTINAL: No abdominal or epigastric pain. No nausea, vomiting, or hematemesis; No diarrhea or constipation. No melena or hematochezia.  GENITOURINARY: No dysuria, frequency or hematuria  NEUROLOGICAL: No numbness or weakness  SKIN: No itching, burning, rashes, or lesions   All other review of systems is negative unless indicated above    Vital Signs  T(F): 97.5 (18 @ 06:00), Max: 97.6 (04-15-18 @ 19:39)  HR: 68 (18 @ 10:00) (57 - 68)  BP: 144/60 (18 @ 10:00) (92/40 - 144/60)  ABP: --  RR: 12 (18 @ 10:00) (12 - 19)  SpO2: 100% (18 @ 10:00) (98% - 100%)  Wt(kg): --  CVP(cm H2O): --  CO: --  PCWP: --    I and O's:     @ 07:01  -  04-15 @ 07:00  --------------------------------------------------------  IN:    IV PiggyBack: 250 mL    propofol Infusion: 8.8 mL  Total IN: 258.8 mL    OUT:    Voided: 125 mL  Total OUT: 125 mL    Total NET: 133.8 mL      04-15 @ 07:01  -   @ 07:00  --------------------------------------------------------  IN:    Glucerna: 300 mL    IV PiggyBack: 100 mL    propofol Infusion: 272.3 mL  Total IN: 672.3 mL    OUT:    Indwelling Catheter - Urethral: 1310 mL    Stool: 1 mL  Total OUT: 1311 mL    Total NET: -638.7 mL       @ 07:01  -   @ 11:18  --------------------------------------------------------  IN:    Glucerna: 40 mL    propofol Infusion: 10.1 mL    Solution: 100 mL  Total IN: 150.1 mL    OUT:    Indwelling Catheter - Urethral: 160 mL  Total OUT: 160 mL    Total NET: -9.9 mL        Daily     Daily Weight in k (2018 08:00)    PHYSICAL EXAM:  Constitutional: well developed, well nourished  and in nad  HEENT: PERRLA,  no icteric sclera and mild pallor of conjunctiva noted  Neck: No JVD, thyromegaly or adenopathy  Respiratory: reduced air entry lower lungs with no rales, wheezing or rhonchi; + vent  Cardiovascular: S1 and S2 normally heard  Gastrointestinal: soft, distended, nontender and normal bowel sounds heard, +NG tube  Extremities: No peripheral edema or cyanosis  Neurological: A/O x 3, no focal deficits  Psychiatric: Normal mood, normal affect  : No flank tenderness  Skin: No rashes        LABS:                        8.4    6.5   )-----------( 193      ( 2018 04:50 )             25.3     4.2  --        -16    133<L>  |  98  |  29<H>  ----------------------------<  191<H>  4.3   |  26  |  3.09<H>  -15    135  |  99  |  32<H>  ----------------------------<  96  4.3   |  25  |  2.91<H>    Ca    7.2<L>      2018 04:50  Ca    7.6<L>      15 Apr 2018 03:08  Phos  4.2     -16  Mg     1.9     -16    TPro  5.6<L>  /  Alb  2.4<L>  /  TBili  0.3  /  DBili  x   /  AST  26  /  ALT  15  /  AlkPhos  96  04-15    URINE STUDIES:  Urinalysis Basic - ( 15 Apr 2018 04:06 )    Color: Yellow / Appearance: Clear / S.005 / pH: x  Gluc: x / Ketone: Negative  / Bili: Negative / Urobili: 4   Blood: x / Protein: 100 / Nitrite: Negative   Leuk Esterase: Trace / RBC: 2-5 /HPF / WBC 3-5 /HPF   Sq Epi: x / Non Sq Epi: Few /HPF / Bacteria: Trace /HPF    RADIOLOGY & ADDITIONAL STUDIES:    < from: Xray Pelvis AP only (04.15.18 @ 20:22) >  IMPRESSION: Posterior dislocation of right hipprosthesis with subsequent   relocation. No acute fracture.     < end of copied text >

## 2018-04-16 NOTE — PROGRESS NOTE ADULT - ATTENDING COMMENTS
Patient seen and examined with resident, Addendum to above.    pt extubated this am  family updated bedside    Right hip incision site examined, noted serous drainage, no puss or dehiscence of wound noted.    Assessment  Acute hypoxic resp failure s/p mechanical ventilation  Acute pulmonary Edema  s/p Hip arthoplasty  acute on chronic CHF exacerbation, diastolic  underlying CAD s/p CABG, DM2, htn, BPH    Plan Patient seen and examined with resident, Addendum to above.    pt extubated this am  family updated bedside    Right hip incision site examined, noted serous drainage, no puss or dehiscence of wound noted.    Assessment  Acute hypoxic resp failure s/p mechanical ventilation  Acute pulmonary Edema  acute on chronic CHF - suspect diastolic  LOBITO on CKD  s/p Hip arthoplasty recent - wound does not appear infected today -   underlying CAD s/p CABG, DM2, htn, BPH    Plan  Wean from mechanical ventilation  empiric abx  orhto f/u   s/p reduction of displaced hip right.  ID f/u  keep negative balance  Renal f/u as pt has ?LOBITO on CKD

## 2018-04-17 DIAGNOSIS — R53.81 OTHER MALAISE: ICD-10-CM

## 2018-04-17 DIAGNOSIS — Z51.5 ENCOUNTER FOR PALLIATIVE CARE: ICD-10-CM

## 2018-04-17 LAB
ALBUMIN SERPL ELPH-MCNC: 1.9 G/DL — LOW (ref 3.5–5)
ALP SERPL-CCNC: 99 U/L — SIGNIFICANT CHANGE UP (ref 40–120)
ALT FLD-CCNC: 10 U/L DA — SIGNIFICANT CHANGE UP (ref 10–60)
ANION GAP SERPL CALC-SCNC: 8 MMOL/L — SIGNIFICANT CHANGE UP (ref 5–17)
AST SERPL-CCNC: 18 U/L — SIGNIFICANT CHANGE UP (ref 10–40)
BASOPHILS # BLD AUTO: 0.1 K/UL — SIGNIFICANT CHANGE UP (ref 0–0.2)
BASOPHILS NFR BLD AUTO: 0.9 % — SIGNIFICANT CHANGE UP (ref 0–2)
BILIRUB SERPL-MCNC: 0.5 MG/DL — SIGNIFICANT CHANGE UP (ref 0.2–1.2)
BUN SERPL-MCNC: 38 MG/DL — HIGH (ref 7–18)
CALCIUM SERPL-MCNC: 7.8 MG/DL — LOW (ref 8.4–10.5)
CHLORIDE SERPL-SCNC: 97 MMOL/L — SIGNIFICANT CHANGE UP (ref 96–108)
CO2 SERPL-SCNC: 27 MMOL/L — SIGNIFICANT CHANGE UP (ref 22–31)
CREAT ?TM UR-MCNC: <13 MG/DL — SIGNIFICANT CHANGE UP
CREAT SERPL-MCNC: 3.38 MG/DL — HIGH (ref 0.5–1.3)
EOSINOPHIL # BLD AUTO: 0.1 K/UL — SIGNIFICANT CHANGE UP (ref 0–0.5)
EOSINOPHIL NFR BLD AUTO: 1.4 % — SIGNIFICANT CHANGE UP (ref 0–6)
GLUCOSE BLDC GLUCOMTR-MCNC: 124 MG/DL — HIGH (ref 70–99)
GLUCOSE BLDC GLUCOMTR-MCNC: 125 MG/DL — HIGH (ref 70–99)
GLUCOSE BLDC GLUCOMTR-MCNC: 155 MG/DL — HIGH (ref 70–99)
GLUCOSE BLDC GLUCOMTR-MCNC: 171 MG/DL — HIGH (ref 70–99)
GLUCOSE BLDC GLUCOMTR-MCNC: 208 MG/DL — HIGH (ref 70–99)
GLUCOSE BLDC GLUCOMTR-MCNC: 242 MG/DL — HIGH (ref 70–99)
GLUCOSE SERPL-MCNC: 108 MG/DL — HIGH (ref 70–99)
HCT VFR BLD CALC: 24.6 % — LOW (ref 39–50)
HGB BLD-MCNC: 8.4 G/DL — LOW (ref 13–17)
LYMPHOCYTES # BLD AUTO: 1.1 K/UL — SIGNIFICANT CHANGE UP (ref 1–3.3)
LYMPHOCYTES # BLD AUTO: 16.9 % — SIGNIFICANT CHANGE UP (ref 13–44)
MAGNESIUM SERPL-MCNC: 1.9 MG/DL — SIGNIFICANT CHANGE UP (ref 1.6–2.6)
MCHC RBC-ENTMCNC: 31.7 PG — SIGNIFICANT CHANGE UP (ref 27–34)
MCHC RBC-ENTMCNC: 34 GM/DL — SIGNIFICANT CHANGE UP (ref 32–36)
MCV RBC AUTO: 93.2 FL — SIGNIFICANT CHANGE UP (ref 80–100)
MONOCYTES # BLD AUTO: 0.5 K/UL — SIGNIFICANT CHANGE UP (ref 0–0.9)
MONOCYTES NFR BLD AUTO: 7.7 % — SIGNIFICANT CHANGE UP (ref 2–14)
NEUTROPHILS # BLD AUTO: 4.6 K/UL — SIGNIFICANT CHANGE UP (ref 1.8–7.4)
NEUTROPHILS NFR BLD AUTO: 73.2 % — SIGNIFICANT CHANGE UP (ref 43–77)
OSMOLALITY UR: 281 MOS/KG — SIGNIFICANT CHANGE UP (ref 50–1200)
PHOSPHATE SERPL-MCNC: 4.9 MG/DL — HIGH (ref 2.5–4.5)
PLATELET # BLD AUTO: 199 K/UL — SIGNIFICANT CHANGE UP (ref 150–400)
POTASSIUM SERPL-MCNC: 4.5 MMOL/L — SIGNIFICANT CHANGE UP (ref 3.5–5.3)
POTASSIUM SERPL-SCNC: 4.5 MMOL/L — SIGNIFICANT CHANGE UP (ref 3.5–5.3)
PROT ?TM UR-MCNC: 28 MG/DL — HIGH (ref 0–12)
PROT SERPL-MCNC: 5.1 G/DL — LOW (ref 6–8.3)
RBC # BLD: 2.64 M/UL — LOW (ref 4.2–5.8)
RBC # FLD: 13.5 % — SIGNIFICANT CHANGE UP (ref 10.3–14.5)
SODIUM SERPL-SCNC: 132 MMOL/L — LOW (ref 135–145)
SODIUM UR-SCNC: 115 MMOL/L — SIGNIFICANT CHANGE UP (ref 40–220)
WBC # BLD: 6.3 K/UL — SIGNIFICANT CHANGE UP (ref 3.8–10.5)
WBC # FLD AUTO: 6.3 K/UL — SIGNIFICANT CHANGE UP (ref 3.8–10.5)

## 2018-04-17 PROCEDURE — 76775 US EXAM ABDO BACK WALL LIM: CPT | Mod: 26

## 2018-04-17 PROCEDURE — 99221 1ST HOSP IP/OBS SF/LOW 40: CPT

## 2018-04-17 PROCEDURE — 71045 X-RAY EXAM CHEST 1 VIEW: CPT | Mod: 26

## 2018-04-17 PROCEDURE — 99223 1ST HOSP IP/OBS HIGH 75: CPT

## 2018-04-17 RX ORDER — DOCUSATE SODIUM 100 MG
1 CAPSULE ORAL
Qty: 0 | Refills: 0 | COMMUNITY

## 2018-04-17 RX ORDER — FUROSEMIDE 40 MG
80 TABLET ORAL DAILY
Qty: 0 | Refills: 0 | Status: DISCONTINUED | OUTPATIENT
Start: 2018-04-17 | End: 2018-04-17

## 2018-04-17 RX ORDER — ATORVASTATIN CALCIUM 80 MG/1
1 TABLET, FILM COATED ORAL
Qty: 0 | Refills: 0 | COMMUNITY
Start: 2018-04-17

## 2018-04-17 RX ORDER — PANTOPRAZOLE SODIUM 20 MG/1
40 TABLET, DELAYED RELEASE ORAL
Qty: 0 | Refills: 0 | COMMUNITY
Start: 2018-04-17

## 2018-04-17 RX ADMIN — ISOSORBIDE MONONITRATE 30 MILLIGRAM(S): 60 TABLET, EXTENDED RELEASE ORAL at 13:26

## 2018-04-17 RX ADMIN — Medication 1: at 22:31

## 2018-04-17 RX ADMIN — ATORVASTATIN CALCIUM 40 MILLIGRAM(S): 80 TABLET, FILM COATED ORAL at 22:30

## 2018-04-17 RX ADMIN — TAMSULOSIN HYDROCHLORIDE 0.4 MILLIGRAM(S): 0.4 CAPSULE ORAL at 22:32

## 2018-04-17 RX ADMIN — CITALOPRAM 20 MILLIGRAM(S): 10 TABLET, FILM COATED ORAL at 13:25

## 2018-04-17 RX ADMIN — Medication 2: at 14:24

## 2018-04-17 RX ADMIN — CLOPIDOGREL BISULFATE 75 MILLIGRAM(S): 75 TABLET, FILM COATED ORAL at 13:25

## 2018-04-17 RX ADMIN — PANTOPRAZOLE SODIUM 40 MILLIGRAM(S): 20 TABLET, DELAYED RELEASE ORAL at 13:26

## 2018-04-17 RX ADMIN — ATENOLOL 25 MILLIGRAM(S): 25 TABLET ORAL at 05:25

## 2018-04-17 RX ADMIN — CEFEPIME 100 MILLIGRAM(S): 1 INJECTION, POWDER, FOR SOLUTION INTRAMUSCULAR; INTRAVENOUS at 05:25

## 2018-04-17 RX ADMIN — FINASTERIDE 5 MILLIGRAM(S): 5 TABLET, FILM COATED ORAL at 13:26

## 2018-04-17 RX ADMIN — HEPARIN SODIUM 5000 UNIT(S): 5000 INJECTION INTRAVENOUS; SUBCUTANEOUS at 22:31

## 2018-04-17 RX ADMIN — Medication 1: at 18:47

## 2018-04-17 RX ADMIN — Medication 2: at 10:18

## 2018-04-17 RX ADMIN — HEPARIN SODIUM 5000 UNIT(S): 5000 INJECTION INTRAVENOUS; SUBCUTANEOUS at 14:30

## 2018-04-17 RX ADMIN — Medication 60 MILLIGRAM(S): at 05:25

## 2018-04-17 RX ADMIN — Medication 100 MILLIGRAM(S): at 08:56

## 2018-04-17 RX ADMIN — HEPARIN SODIUM 5000 UNIT(S): 5000 INJECTION INTRAVENOUS; SUBCUTANEOUS at 05:26

## 2018-04-17 RX ADMIN — Medication 81 MILLIGRAM(S): at 13:26

## 2018-04-17 RX ADMIN — Medication 1 APPLICATION(S): at 13:27

## 2018-04-17 NOTE — PROGRESS NOTE ADULT - SUBJECTIVE AND OBJECTIVE BOX
INTERVAL HPI/OVERNIGHT EVENTS: No major event overnight.     PRESSORS: [ ] YES [x ] NO  WHICH:        Antimicrobial:  cefepime  IVPB 1000 milliGRAM(s) IV Intermittent every 24 hours  cefepime  IVPB      vancomycin  IVPB 500 milliGRAM(s) IV Intermittent every 24 hours    Cardiovascular:  ATENolol  Tablet 25 milliGRAM(s) Oral daily  furosemide   Injectable 60 milliGRAM(s) IV Push every 12 hours  isosorbide   mononitrate ER Tablet (IMDUR) 30 milliGRAM(s) Oral daily  tamsulosin 0.4 milliGRAM(s) Oral at bedtime    Pulmonary:    Hematalogic:  aspirin  chewable 81 milliGRAM(s) Oral daily  clopidogrel Tablet 75 milliGRAM(s) Oral daily  heparin  Injectable 5000 Unit(s) SubCutaneous every 8 hours    Other:  atorvastatin 40 milliGRAM(s) Oral at bedtime  citalopram 20 milliGRAM(s) Oral daily  collagenase Ointment 1 Application(s) Topical daily  finasteride 5 milliGRAM(s) Oral daily  HYDROmorphone  Injectable 1 milliGRAM(s) IV Push every 4 hours PRN  insulin lispro (HumaLOG) corrective regimen sliding scale   SubCutaneous every 4 hours  pantoprazole  Injectable 40 milliGRAM(s) IV Push daily    aspirin  chewable 81 milliGRAM(s) Oral daily  ATENolol  Tablet 25 milliGRAM(s) Oral daily  atorvastatin 40 milliGRAM(s) Oral at bedtime  cefepime  IVPB 1000 milliGRAM(s) IV Intermittent every 24 hours  cefepime  IVPB      citalopram 20 milliGRAM(s) Oral daily  clopidogrel Tablet 75 milliGRAM(s) Oral daily  collagenase Ointment 1 Application(s) Topical daily  finasteride 5 milliGRAM(s) Oral daily  furosemide   Injectable 60 milliGRAM(s) IV Push every 12 hours  heparin  Injectable 5000 Unit(s) SubCutaneous every 8 hours  HYDROmorphone  Injectable 1 milliGRAM(s) IV Push every 4 hours PRN  insulin lispro (HumaLOG) corrective regimen sliding scale   SubCutaneous every 4 hours  isosorbide   mononitrate ER Tablet (IMDUR) 30 milliGRAM(s) Oral daily  pantoprazole  Injectable 40 milliGRAM(s) IV Push daily  tamsulosin 0.4 milliGRAM(s) Oral at bedtime  vancomycin  IVPB 500 milliGRAM(s) IV Intermittent every 24 hours    Drug Dosing Weight  Height (cm): 167.6 (15 Apr 2018 08:08)  Weight (kg): 67 (15 Apr 2018 07:00)  BMI (kg/m2): 23.9 (15 Apr 2018 08:08)  BSA (m2): 1.76 (15 Apr 2018 08:08)    CENTRAL LINE: [ ] YES [x ] NO  LOCATION:   DATE INSERTED:  REMOVE: [ ] YES [ ] NO  EXPLAIN:    SEPULVEDA: [x ] YES [ ] NO    DATE INSERTED: 4/15  REMOVE:  [ ] YES [ ] NO  EXPLAIN:    A-LINE:  [ ] YES [x ] NO  LOCATION:   DATE INSERTED:  REMOVE:  [ ] YES [ ] NO  EXPLAIN:    PMH -reviewed admission note, no change since admission      ICU Vital Signs Last 24 Hrs  T(C): 36.4 (17 Apr 2018 05:00), Max: 36.8 (16 Apr 2018 20:05)  T(F): 97.6 (17 Apr 2018 05:00), Max: 98.3 (16 Apr 2018 20:05)  HR: 67 (17 Apr 2018 06:00) (61 - 73)  BP: 141/56 (17 Apr 2018 06:00) (92/43 - 144/60)  BP(mean): 79 (17 Apr 2018 06:00) (54 - 107)  ABP: --  ABP(mean): --  RR: 15 (17 Apr 2018 06:00) (11 - 24)  SpO2: 100% (17 Apr 2018 06:00) (100% - 100%)      ABG - ( 16 Apr 2018 11:15 )  pH: 7.44  /  pCO2: 38    /  pO2: 102   / HCO3: 26    / Base Excess: 1.8   /  SaO2: 98                    04-16 @ 07:01  -  04-17 @ 07:00  --------------------------------------------------------  IN: 200.1 mL / OUT: 4070 mL / NET: -3869.9 mL        Mode: CPAP with PS  FiO2: 40  PEEP: 5  PS: 5  ITime: 1  MAP: 11  PIP: 10      HEENT: [  ] Wnl  [  ] Pharyngeal congestion [ x x] ET and NGT in place.    Neck:  [ x ] Supple  [  ]Lymphadenopathy  [ x ] No JVD   [  ] JVD  [  ] Masses   [  ] WNL    CHEST/Respiratory:  [  ]Clear to auscultation  [ x ] Rales   [  ] Rhonchi   [  ] Wheezing     [  ] Chest Tenderness      Cardiovascular:  [ x ] Reg S1 S2   [  ] Irreg S1 S2   [ x ]No Murmur  [  ] +ve Murmurs  [  ]Systolic [  ]Diastolic      Abdomen:  [ x ] Soft  [ x ] No tendrerness  [  ] Tenderness  [  ] Organomegaly  [  ] ABD Distention  [  ] Rigidity                       [ x ] No Regidity                       [ x ] No Rebound Tenderness  [  ] No Guarding Rigidity  [  ] Rebound Tenderness[  ] Guarding Rigidity                          [ x ]  +ve Bowel Sounds  [  ] Decreased Bowel Sounds    [  ] Absent Bowel Sounds                            Extremities: [  ] No edema [ x ] Edema  [  ] Clubbing   [  ] Cyanosis                         [ x ] No Tender Calf muscles  [  ] Tender Calf muscles                        [ x ] Palpable peripheral pulses    Neurological: [  ] Awake  [  ] Alert  [  ] Oriented  x                             [  ] Confused  [  ] Drowsy  [ x ] respond to painful stimuli  [  ] Unresponsive    Skin:  [  ] Intact [  ] Redness [  ] Thrombophlebitis  [  ] Rashes  [  ] Dry  [ x ] Right hip surgical draining wound    Ortho:  [  ] Joint Swelling  [  ] Joint erythema [  ] Joint tenderness                [  ] Increased temp. to touch  [  ] DJD [ x ] WNL      LABS:  CBC Full  -  ( 16 Apr 2018 04:50 )  WBC Count : 6.5 K/uL  Hemoglobin : 8.4 g/dL  Hematocrit : 25.3 %  Platelet Count - Automated : 193 K/uL  Mean Cell Volume : 93.5 fl  Mean Cell Hemoglobin : 31.1 pg  Mean Cell Hemoglobin Concentration : 33.3 gm/dL  Auto Neutrophil # : 5.0 K/uL  Auto Lymphocyte # : 0.9 K/uL  Auto Monocyte # : 0.4 K/uL  Auto Eosinophil # : 0.1 K/uL  Auto Basophil # : 0.1 K/uL  Auto Neutrophil % : 77.2 %  Auto Lymphocyte % : 14.2 %  Auto Monocyte % : 6.3 %  Auto Eosinophil % : 1.5 %  Auto Basophil % : 0.8 %    04-17    132<L>  |  97  |  38<H>  ----------------------------<  108<H>  4.5   |  27  |  3.38<H>    Ca    7.8<L>      17 Apr 2018 06:35  Phos  4.9     04-17  Mg     1.9     04-17    TPro  5.1<L>  /  Alb  1.9<L>  /  TBili  0.5  /  DBili  x   /  AST  18  /  ALT  10  /  AlkPhos  99  04-17        Culture Results:   No growth to date. (04-15 @ 12:42)  Culture Results:   No growth to date. (04-15 @ 12:41)  Culture Results:   No growth (04-15 @ 12:09)      RADIOLOGY & ADDITIONAL STUDIES REVIEWED:  ***    [ ]GOALS OF CARE DISCUSSION WITH PATIENT/FAMILY/PROXY:    CRITICAL CARE TIME SPENT: 35 minutes INTERVAL HPI/OVERNIGHT EVENTS: No major event overnight.   extubated yesterday    pt alert, responsive, no complaints    PRESSORS: [ ] YES [x ] NO  WHICH:        Antimicrobial:  cefepime  IVPB 1000 milliGRAM(s) IV Intermittent every 24 hours  cefepime  IVPB      vancomycin  IVPB 500 milliGRAM(s) IV Intermittent every 24 hours    Cardiovascular:  ATENolol  Tablet 25 milliGRAM(s) Oral daily  furosemide   Injectable 60 milliGRAM(s) IV Push every 12 hours  isosorbide   mononitrate ER Tablet (IMDUR) 30 milliGRAM(s) Oral daily  tamsulosin 0.4 milliGRAM(s) Oral at bedtime    Pulmonary:    Hematalogic:  aspirin  chewable 81 milliGRAM(s) Oral daily  clopidogrel Tablet 75 milliGRAM(s) Oral daily  heparin  Injectable 5000 Unit(s) SubCutaneous every 8 hours    Other:  atorvastatin 40 milliGRAM(s) Oral at bedtime  citalopram 20 milliGRAM(s) Oral daily  collagenase Ointment 1 Application(s) Topical daily  finasteride 5 milliGRAM(s) Oral daily  HYDROmorphone  Injectable 1 milliGRAM(s) IV Push every 4 hours PRN  insulin lispro (HumaLOG) corrective regimen sliding scale   SubCutaneous every 4 hours  pantoprazole  Injectable 40 milliGRAM(s) IV Push daily    aspirin  chewable 81 milliGRAM(s) Oral daily  ATENolol  Tablet 25 milliGRAM(s) Oral daily  atorvastatin 40 milliGRAM(s) Oral at bedtime  cefepime  IVPB 1000 milliGRAM(s) IV Intermittent every 24 hours  cefepime  IVPB      citalopram 20 milliGRAM(s) Oral daily  clopidogrel Tablet 75 milliGRAM(s) Oral daily  collagenase Ointment 1 Application(s) Topical daily  finasteride 5 milliGRAM(s) Oral daily  furosemide   Injectable 60 milliGRAM(s) IV Push every 12 hours  heparin  Injectable 5000 Unit(s) SubCutaneous every 8 hours  HYDROmorphone  Injectable 1 milliGRAM(s) IV Push every 4 hours PRN  insulin lispro (HumaLOG) corrective regimen sliding scale   SubCutaneous every 4 hours  isosorbide   mononitrate ER Tablet (IMDUR) 30 milliGRAM(s) Oral daily  pantoprazole  Injectable 40 milliGRAM(s) IV Push daily  tamsulosin 0.4 milliGRAM(s) Oral at bedtime  vancomycin  IVPB 500 milliGRAM(s) IV Intermittent every 24 hours    Drug Dosing Weight  Height (cm): 167.6 (15 Apr 2018 08:08)  Weight (kg): 67 (15 Apr 2018 07:00)  BMI (kg/m2): 23.9 (15 Apr 2018 08:08)  BSA (m2): 1.76 (15 Apr 2018 08:08)    CENTRAL LINE: [ ] YES [x ] NO  LOCATION:   DATE INSERTED:  REMOVE: [ ] YES [ ] NO  EXPLAIN:    SEPULVEDA: [x ] YES [ ] NO    DATE INSERTED: 4/15  REMOVE:  [ ] YES [ ] NO  EXPLAIN:    A-LINE:  [ ] YES [x ] NO  LOCATION:   DATE INSERTED:  REMOVE:  [ ] YES [ ] NO  EXPLAIN:    PMH -reviewed admission note, no change since admission      ICU Vital Signs Last 24 Hrs  T(C): 36.4 (17 Apr 2018 05:00), Max: 36.8 (16 Apr 2018 20:05)  T(F): 97.6 (17 Apr 2018 05:00), Max: 98.3 (16 Apr 2018 20:05)  HR: 67 (17 Apr 2018 06:00) (61 - 73)  BP: 141/56 (17 Apr 2018 06:00) (92/43 - 144/60)  BP(mean): 79 (17 Apr 2018 06:00) (54 - 107)  ABP: --  ABP(mean): --  RR: 15 (17 Apr 2018 06:00) (11 - 24)  SpO2: 100% (17 Apr 2018 06:00) (100% - 100%)      ABG - ( 16 Apr 2018 11:15 )  pH: 7.44  /  pCO2: 38    /  pO2: 102   / HCO3: 26    / Base Excess: 1.8   /  SaO2: 98                    04-16 @ 07:01  -  04-17 @ 07:00  --------------------------------------------------------  IN: 200.1 mL / OUT: 4070 mL / NET: -3869.9 mL        Mode: CPAP with PS  FiO2: 40  PEEP: 5  PS: 5  ITime: 1  MAP: 11  PIP: 10      HEENT:  Dry MM, No JVD, No stridor    Neck:  [ x ] Supple  [  ]Lymphadenopathy  [ x ] No JVD   [  ] JVD  [  ] Masses   [  ] WNL    CHEST/Respiratory:  [  ]Clear to auscultation  [ x ] Rales   [  ] Rhonchi   [  ] Wheezing     [  ] Chest Tenderness  ; B/L air entry    Cardiovascular:  [ x ] Reg S1 S2   [  ] Irreg S1 S2   [ x ]No Murmur  [  ] +ve Murmurs  [  ]Systolic [  ]Diastolic      Abdomen:  [ x ] Soft  [ x ] No tendrerness  [  ] Tenderness  [  ] Organomegaly  [  ] ABD Distention  [  ] Rigidity                       [ x ] No Regidity                       [ x ] No Rebound Tenderness  [  ] No Guarding Rigidity  [  ] Rebound Tenderness[  ] Guarding Rigidity                          [ x ]  +ve Bowel Sounds  [  ] Decreased Bowel Sounds    [  ] Absent Bowel Sounds                            Extremities: [  ] No edema [ x ] Edema  [  ] Clubbing   [  ] Cyanosis                         [ x ] No Tender Calf muscles  [  ] Tender Calf muscles                        [ x ] Palpable peripheral pulses    Neurological: [  ] Awake  [  ] Alert  [  ] Oriented  x                             [  ] Confused  [  ] Drowsy  [ x ] respond to painful stimuli  [  ] Unresponsive    Skin:  [  ] Intact [  ] Redness [  ] Thrombophlebitis  [  ] Rashes  [  ] Dry  [ x ] Right hip surgical draining wound    Ortho:  [  ] Joint Swelling  [  ] Joint erythema [  ] Joint tenderness                [  ] Increased temp. to touch  [  ] DJD [ x ] WNL      LABS:  CBC Full  -  ( 16 Apr 2018 04:50 )  WBC Count : 6.5 K/uL  Hemoglobin : 8.4 g/dL  Hematocrit : 25.3 %  Platelet Count - Automated : 193 K/uL  Mean Cell Volume : 93.5 fl  Mean Cell Hemoglobin : 31.1 pg  Mean Cell Hemoglobin Concentration : 33.3 gm/dL  Auto Neutrophil # : 5.0 K/uL  Auto Lymphocyte # : 0.9 K/uL  Auto Monocyte # : 0.4 K/uL  Auto Eosinophil # : 0.1 K/uL  Auto Basophil # : 0.1 K/uL  Auto Neutrophil % : 77.2 %  Auto Lymphocyte % : 14.2 %  Auto Monocyte % : 6.3 %  Auto Eosinophil % : 1.5 %  Auto Basophil % : 0.8 %    04-17    132<L>  |  97  |  38<H>  ----------------------------<  108<H>  4.5   |  27  |  3.38<H>    Ca    7.8<L>      17 Apr 2018 06:35  Phos  4.9     04-17  Mg     1.9     04-17    TPro  5.1<L>  /  Alb  1.9<L>  /  TBili  0.5  /  DBili  x   /  AST  18  /  ALT  10  /  AlkPhos  99  04-17        Culture Results:   No growth to date. (04-15 @ 12:42)  Culture Results:   No growth to date. (04-15 @ 12:41)  Culture Results:   No growth (04-15 @ 12:09)      RADIOLOGY & ADDITIONAL STUDIES REVIEWED:  ***    [ ]GOALS OF CARE DISCUSSION WITH PATIENT/FAMILY/PROXY:    CRITICAL CARE TIME SPENT: 35 minutes

## 2018-04-17 NOTE — INPATIENT CERTIFICATION FOR MEDICARE PATIENTS - PHYSICIAN CONCUR
I concur with the Admission Order and I certify that services are provided in accordance with Section 42 CFR § 412.3
Other (Free Text): Samples of Ala-anjel given to pt
Detail Level: Simple
Note Text (......Xxx Chief Complaint.): This diagnosis correlates with the

## 2018-04-17 NOTE — CONSULT NOTE ADULT - PROBLEM SELECTOR RECOMMENDATION 4
Pt alert/responsive, but deferring to his son, Esteban, for medical decision making. Met with son face to face. Discussed benefits vs burdens of resuscitation, intubation, artificial nutrition. He requests pt to remain a full code at this time. MOLST completed as per wishes: CPR / trial intubation / send to hospital / trial feeding tube / trial IVF / use abx. All questions answered.

## 2018-04-17 NOTE — PROGRESS NOTE ADULT - ATTENDING COMMENTS
Patient seen and examined with resident, Addendum to above.    pt extubated 4/16 doing well      Assessment  Acute hypoxic resp failure s/p mechanical ventilation due to Acute pulmonary Edema  acute on chronic CHF - suspect diastolic  LOBITO on CKD  s/p Hip arthoplasty recent - ? infection on admission on empiric abx  s/p reduction of displaced hip right.  underlying CAD s/p CABG, DM2, htn, BPH    Plan  Cardio f/u; renal f/u; Ortho f/u; ID f/u  N/C o2  empiric abx as per ID  Check CT hip.   US renal  advance diet, PT OOB  Taper diuretics as per renal  orhto f/u  transfer to medical floor  son filled MOLST form - Full code

## 2018-04-17 NOTE — PROGRESS NOTE ADULT - SUBJECTIVE AND OBJECTIVE BOX
pt seen and examined.pts current chart reviewed and case discussed with resident covering.    SUBJECTIVE:  pt feels fine and denies cp,sob,gi or gu/uremic  symptoms    REVIEW OF SYSTEMS:  CONSTITUTIONAL: No weakness, fevers or chills  EYES/ENT: No visual changes;  No vertigo or throat pain   NECK: No pain or stiffness  RESPIRATORY: + cough on deep inspiration, wheezing, hemoptysis; No shortness of breath  CARDIOVASCULAR: No chest pain or palpitations  GASTROINTESTINAL: No abdominal or epigastric pain. No nausea, vomiting, or hematemesis; No diarrhea or constipation. No melena or hematochezia.  GENITOURINARY: No dysuria, frequency , hematuria, flank pain or nocturia  NEUROLOGICAL: No numbness or weakness  SKIN: No itching, burning, rashes, or lesions   All other review of systems is negative unless indicated above    Current meds:    aspirin  chewable 81 milliGRAM(s) Oral daily  ATENolol  Tablet 25 milliGRAM(s) Oral daily  atorvastatin 40 milliGRAM(s) Oral at bedtime  cefepime  IVPB 1000 milliGRAM(s) IV Intermittent every 24 hours  cefepime  IVPB      citalopram 20 milliGRAM(s) Oral daily  clopidogrel Tablet 75 milliGRAM(s) Oral daily  collagenase Ointment 1 Application(s) Topical daily  finasteride 5 milliGRAM(s) Oral daily  furosemide   Injectable 80 milliGRAM(s) IV Push daily  heparin  Injectable 5000 Unit(s) SubCutaneous every 8 hours  HYDROmorphone  Injectable 1 milliGRAM(s) IV Push every 4 hours PRN  insulin lispro (HumaLOG) corrective regimen sliding scale   SubCutaneous every 4 hours  isosorbide   mononitrate ER Tablet (IMDUR) 30 milliGRAM(s) Oral daily  pantoprazole  Injectable 40 milliGRAM(s) IV Push daily  tamsulosin 0.4 milliGRAM(s) Oral at bedtime  vancomycin  IVPB 500 milliGRAM(s) IV Intermittent every 24 hours      Vital Signs    T(F): 98.3 (18 @ 08:00), Max: 98.3 (18 @ 20:05)  HR: 68 (18 @ 09:00) (62 - 73)  BP: 139/55 (18 @ 09:00) (113/50 - 142/58)  ABP: --  RR: 15 (18 @ 09:00) (11 - 24)  SpO2: 100% (18 @ 09:00) (100% - 100%)  Wt(kg): --  CVP(cm H2O): --  CO: --  PCWP: --    I and O's:    04-15 @ 07:01  -   @ 07:00  --------------------------------------------------------  IN:    Glucerna: 300 mL    IV PiggyBack: 100 mL    propofol Infusion: 272.3 mL  Total IN: 672.3 mL    OUT:    Indwelling Catheter - Urethral: 1310 mL    Stool: 1 mL  Total OUT: 1311 mL    Total NET: -638.7 mL       @ 07:01  -   @ 07:00  --------------------------------------------------------  IN:    Glucerna: 40 mL    IV PiggyBack: 50 mL    propofol Infusion: 10.1 mL    Solution: 100 mL  Total IN: 200.1 mL    OUT:    Indwelling Catheter - Urethral: 4070 mL  Total OUT: 4070 mL    Total NET: -3869.9 mL       @ 07:01  -   @ 11:47  --------------------------------------------------------  IN:    Solution: 100 mL  Total IN: 100 mL    OUT:    Indwelling Catheter - Urethral: 900 mL  Total OUT: 900 mL    Total NET: -800 mL        Daily     Daily Weight in k.7 (2018 08:00)    PHYSICAL EXAM:  Constitutional: well developed, well nourished  and in nad  HEENT: PERRLA,  no icteric sclera and mild pallor of conjunctiva noted  Neck: No JVD, thyromegaly or adenopathy  Respiratory: mild b/l rales at the base  Cardiovascular: S1 and S2 normally heard  Gastrointestinal: soft, nondistended, nontender and normal bowel sounds heard  Extremities: No peripheral edema or cyanosis  Neurological: A/O x 3, no focal deficits  : No flank or cva tenderness palpated.  Skin: No rashes      LABS:    CBC:                          8.4    6.3   )-----------( 199      ( 2018 06:35 )             24.6           BMP:        132<L>  |  97  |  38<H>  ----------------------------<  108<H>  4.5   |  27  |  3.38<H>  04-16    133<L>  |  98  |  29<H>  ----------------------------<  191<H>  4.3   |  26  |  3.09<H>  15    135  |  99  |  32<H>  ----------------------------<  96  4.3   |  25  |  2.91<H>    Ca    7.8<L>      2018 06:35  Ca    7.2<L>      2018 04:50  Ca    7.6<L>      15 Apr 2018 03:08  Phos  4.9     -17  Phos  4.2     04-16  Mg     1.9     04-17  Mg     1.9     04-16    TPro  5.1<L>  /  Alb  1.9<L>  /  TBili  0.5  /  DBili  x   /  AST  18  /  ALT  10  /  AlkPhos  99  -17  TPro  5.6<L>  /  Alb  2.4<L>  /  TBili  0.3  /  DBili  x   /  AST  26  /  ALT  15  /  AlkPhos  96  04-15    RADIOLOGY & ADDITIONAL STUDIES:    < from: Xray Chest 1 View- PORTABLE-Routine (18 @ 10:02) >  FINDINGS/  IMPRESSION:  Sternotomy wires surgical clips, feeding tube again noted. ET tube no   longer seen. No pneumothorax.    Increased interstitial lung markings without significant change.   Bilateral pleural effusions and adjacent opacities without significant   change in the right given differences in technique. Slightly progressed   left midlung zone.    Heart size cannot be accurately assessed in this projection, but appear   enlarged.    < end of copied text > pt seen and examined.pts current chart reviewed and case discussed with resident covering.    SUBJECTIVE:  pt feels fine and denies cp,sob,gi or gu symptoms    REVIEW OF SYSTEMS:  CONSTITUTIONAL: No weakness, fevers or chills  EYES/ENT: No visual changes;  No vertigo or throat pain   NECK: No pain or stiffness  RESPIRATORY: + cough on deep inspiration, wheezing, hemoptysis; No shortness of breath  CARDIOVASCULAR: No chest pain or palpitations  GASTROINTESTINAL: No abdominal or epigastric pain. No nausea, vomiting, or hematemesis; No diarrhea or constipation. No melena or hematochezia.  GENITOURINARY: No dysuria, frequency , hematuria, flank pain or nocturia  NEUROLOGICAL: No numbness or weakness  SKIN: No itching, burning, rashes, or lesions   All other review of systems is negative unless indicated above    Current meds:    aspirin  chewable 81 milliGRAM(s) Oral daily  ATENolol  Tablet 25 milliGRAM(s) Oral daily  atorvastatin 40 milliGRAM(s) Oral at bedtime  cefepime  IVPB 1000 milliGRAM(s) IV Intermittent every 24 hours  cefepime  IVPB      citalopram 20 milliGRAM(s) Oral daily  clopidogrel Tablet 75 milliGRAM(s) Oral daily  collagenase Ointment 1 Application(s) Topical daily  finasteride 5 milliGRAM(s) Oral daily  furosemide   Injectable 80 milliGRAM(s) IV Push daily  heparin  Injectable 5000 Unit(s) SubCutaneous every 8 hours  HYDROmorphone  Injectable 1 milliGRAM(s) IV Push every 4 hours PRN  insulin lispro (HumaLOG) corrective regimen sliding scale   SubCutaneous every 4 hours  isosorbide   mononitrate ER Tablet (IMDUR) 30 milliGRAM(s) Oral daily  pantoprazole  Injectable 40 milliGRAM(s) IV Push daily  tamsulosin 0.4 milliGRAM(s) Oral at bedtime  vancomycin  IVPB 500 milliGRAM(s) IV Intermittent every 24 hours      Vital Signs    T(F): 98.3 (18 @ 08:00), Max: 98.3 (18 @ 20:05)  HR: 68 (18 @ 09:00) (62 - 73)  BP: 139/55 (18 @ 09:00) (113/50 - 142/58)  ABP: --  RR: 15 (18 @ 09:00) ( - 24)  SpO2: 100% (18 @ 09:00) (100% - 100%)  Wt(kg): --  CVP(cm H2O): --  CO: --  PCWP: --    I and O's:    04-15 @ 07:01  -   @ 07:00  --------------------------------------------------------  IN:    Glucerna: 300 mL    IV PiggyBack: 100 mL    propofol Infusion: 272.3 mL  Total IN: 672.3 mL    OUT:    Indwelling Catheter - Urethral: 1310 mL    Stool: 1 mL  Total OUT: 1311 mL    Total NET: -638.7 mL       @ 07:01  -   @ 07:00  --------------------------------------------------------  IN:    Glucerna: 40 mL    IV PiggyBack: 50 mL    propofol Infusion: 10.1 mL    Solution: 100 mL  Total IN: 200.1 mL    OUT:    Indwelling Catheter - Urethral: 4070 mL  Total OUT: 4070 mL    Total NET: -3869.9 mL       @ 07:01  -   @ 11:47  --------------------------------------------------------  IN:    Solution: 100 mL  Total IN: 100 mL    OUT:    Indwelling Catheter - Urethral: 900 mL  Total OUT: 900 mL    Total NET: -800 mL        Daily     Daily Weight in k.7 (2018 08:00)    PHYSICAL EXAM:  Constitutional: well developed, well nourished  and in nad  HEENT: PERRLA,  no icteric sclera and mild pallor of conjunctiva noted  Neck: No JVD, thyromegaly or adenopathy  Respiratory: mild b/l rales at the base  Cardiovascular: S1 and S2 normally heard  Gastrointestinal: soft, nondistended, nontender and normal bowel sounds heard  Extremities: No peripheral edema or cyanosis  Neurological: A/O x 3, no focal deficits  : No flank or cva tenderness palpated.  Skin: No rashes      LABS:    CBC:                          8.4    6.3   )-----------( 199      ( 2018 06:35 )             24.6           BMP:        132<L>  |  97  |  38<H>  ----------------------------<  108<H>  4.5   |  27  |  3.38<H>  -16    133<L>  |  98  |  29<H>  ----------------------------<  191<H>  4.3   |  26  |  3.09<H>  15    135  |  99  |  32<H>  ----------------------------<  96  4.3   |  25  |  2.91<H>    Ca    7.8<L>      2018 06:35  Ca    7.2<L>      2018 04:50  Ca    7.6<L>      15 Apr 2018 03:08  Phos  4.9     04-17  Phos  4.2     04-16  Mg     1.9     04-17  Mg     1.9     04-16    TPro  5.1<L>  /  Alb  1.9<L>  /  TBili  0.5  /  DBili  x   /  AST  18  /  ALT  10  /  AlkPhos  99  -17  TPro  5.6<L>  /  Alb  2.4<L>  /  TBili  0.3  /  DBili  x   /  AST  26  /  ALT  15  /  AlkPhos  96  04-15    RADIOLOGY & ADDITIONAL STUDIES:    < from: Xray Chest 1 View- PORTABLE-Routine (18 @ 10:02) >  FINDINGS/  IMPRESSION:  Sternotomy wires surgical clips, feeding tube again noted. ET tube no   longer seen. No pneumothorax.    Increased interstitial lung markings without significant change.   Bilateral pleural effusions and adjacent opacities without significant   change in the right given differences in technique. Slightly progressed   left midlung zone.    Heart size cannot be accurately assessed in this projection, but appear   enlarged.    < end of copied text >

## 2018-04-17 NOTE — PROGRESS NOTE ADULT - ASSESSMENT
-S/P RESPIRATORY FAILURE 2/2 PN(HCAP)/CHF-IMPROVED-NET DIURESIS  -WOUND INFECTION  -S/P POSTERIOR DISLOCATION OF HIP-REDUCED  -ACUTE ON CKD  -HX; CAD/CABG,DM,HTN,HLD,BPH,S/P ORIF RT HIP 3/30/18    PLAN; ANTIBX-MAXIPIME/VANCO/02           DVT PPX           ID/ORTHO/RENAL F/U           PROCALCITONIN           REMOVE NGT-SWALLOW EVAL

## 2018-04-17 NOTE — CONSULT NOTE ADULT - PROBLEM SELECTOR RECOMMENDATION 3
Per son's report, pt mostly bedbound, s/p R hip fx - delayed healing. Was receiving home PT prior to admission. Son acknowledges pt may need more assistance at home - CM aware and will f/u tomorrow.
1- Blood sugar monitoring and control.  2- Accu-Cheks with coverage.  3- 1800 karolyn ADA diet.  4- Follow HB A1C.

## 2018-04-17 NOTE — CONSULT NOTE ADULT - PROBLEM SELECTOR RECOMMENDATION 2
LOBITO on CKD (stage 4?) per nephro; likely  pre-renal, with underlying ckd from diabetes and hypertensive disease. Followed by nephro.
1- Orthopedic evaluation,  2- MR, or CT right hip when Feasible.  3- ESR and CRP.  4- Wound care right hip.

## 2018-04-17 NOTE — PROGRESS NOTE ADULT - SUBJECTIVE AND OBJECTIVE BOX
Patient is a 89y old  Male who presents with a chief complaint of resp distress (15 Apr 2018 06:00)      INTERVAL HPI/OVERNIGHT EVENTS:  looks well, no complaints    VITAL SIGNS:  T(F): 97.6 (04-17-18 @ 05:00)  HR: 67 (04-17-18 @ 06:00)  BP: 141/56 (04-17-18 @ 06:00)  RR: 15 (04-17-18 @ 06:00)  SpO2: 100% (04-17-18 @ 06:00)  Wt(kg): --  I&O's Detail    16 Apr 2018 07:01  -  17 Apr 2018 07:00  --------------------------------------------------------  IN:    Glucerna: 40 mL    IV PiggyBack: 50 mL    propofol Infusion: 10.1 mL    Solution: 100 mL  Total IN: 200.1 mL    OUT:    Indwelling Catheter - Urethral: 4070 mL  Total OUT: 4070 mL    Total NET: -3869.9 mL              REVIEW OF SYSTEMS:    CONSTITUTIONAL:  No fevers, chills, sweats    HEENT:  Eyes:  No diplopia or blurred vision. ENT:  No earache, sore throat or runny nose.    CARDIOVASCULAR:  No pressure, squeezing, tightness, or heaviness about the chest; no palpitations.    RESPIRATORY:  Per HPI    GASTROINTESTINAL:  No abdominal pain, nausea, vomiting or diarrhea.    GENITOURINARY:  No dysuria, frequency or urgency.    NEUROLOGIC:  No paresthesias, fasciculations, seizures or weakness.    PSYCHIATRIC:  No disorder of thought or mood.      PHYSICAL EXAM:    Constitutional:no complaints  ENMT:atnc  Neck:supple  Respiratory:clear  Cardiovascular:rr  Gastrointestinal:soft  Extremities:no rt hip wound clean  Vascular:intact  Neurological:non focal  Musculoskeletal:no edema, normal rom    MEDICATIONS  (STANDING):  aspirin  chewable 81 milliGRAM(s) Oral daily  ATENolol  Tablet 25 milliGRAM(s) Oral daily  atorvastatin 40 milliGRAM(s) Oral at bedtime  cefepime  IVPB 1000 milliGRAM(s) IV Intermittent every 24 hours  cefepime  IVPB      citalopram 20 milliGRAM(s) Oral daily  clopidogrel Tablet 75 milliGRAM(s) Oral daily  collagenase Ointment 1 Application(s) Topical daily  finasteride 5 milliGRAM(s) Oral daily  furosemide   Injectable 60 milliGRAM(s) IV Push every 12 hours  heparin  Injectable 5000 Unit(s) SubCutaneous every 8 hours  insulin lispro (HumaLOG) corrective regimen sliding scale   SubCutaneous every 4 hours  isosorbide   mononitrate ER Tablet (IMDUR) 30 milliGRAM(s) Oral daily  pantoprazole  Injectable 40 milliGRAM(s) IV Push daily  tamsulosin 0.4 milliGRAM(s) Oral at bedtime  vancomycin  IVPB 500 milliGRAM(s) IV Intermittent every 24 hours    MEDICATIONS  (PRN):  HYDROmorphone  Injectable 1 milliGRAM(s) IV Push every 4 hours PRN Severe Pain (7 - 10)      Allergies    No Known Allergies    Intolerances        LABS:                        8.4    6.5   )-----------( 193      ( 16 Apr 2018 04:50 )             25.3     04-17    132<L>  |  97  |  38<H>  ----------------------------<  108<H>  4.5   |  27  |  3.38<H>    Ca    7.8<L>      17 Apr 2018 06:35  Phos  4.9     04-17  Mg     1.9     04-17    TPro  5.1<L>  /  Alb  1.9<L>  /  TBili  0.5  /  DBili  x   /  AST  18  /  ALT  10  /  AlkPhos  99  04-17        ABG - ( 16 Apr 2018 11:15 )  pH: 7.44  /  pCO2: 38    /  pO2: 102   / HCO3: 26    / Base Excess: 1.8   /  SaO2: 98                CARDIAC MARKERS ( 15 Apr 2018 09:27 )  0.176 ng/mL / x     / 102 U/L / x     / 3.2 ng/mL      CAPILLARY BLOOD GLUCOSE      POCT Blood Glucose.: 125 mg/dL (17 Apr 2018 05:06)  POCT Blood Glucose.: 124 mg/dL (17 Apr 2018 01:36)  POCT Blood Glucose.: 236 mg/dL (16 Apr 2018 21:27)  POCT Blood Glucose.: 206 mg/dL (16 Apr 2018 16:32)  POCT Blood Glucose.: 252 mg/dL (16 Apr 2018 11:57)    pro-bnp 25930 04-16 @ 04:50

## 2018-04-17 NOTE — CHART NOTE - NSCHARTNOTEFT_GEN_A_CORE
89yo male from home, lives with wife, no home attendant, PMH CAD s/p CABG (12-13 years ago), s/p left hip pinning for left hip fracture (8 years ago), DM, HTN, BPH with urinary retention p/w worsening shortness of breath and altered mental status.  Intubated for hypercapneic respiratory failure 2/2 hypoventilation 2/2 likely aspiration pna vs pulmonary edema     Acute respiratory failure with hypoxia and hypercapnia.    2/2 hypoventilation possibly 2/2 aspiration pna vs CHF exacerbation  new right pleural effusion and bilateral congestion   - Intubated on 4/15 and extubated on 4/16(was on propofol for sedation)  - BNP 97620, f/u BCx  -DC Lasix, Cardio Dr Cantrell    Closed fracture of right hip with delayed healing,     -on vancomycin and cefepime started 4/15  Dr Lay seen and closed reduction done  -no concern for infection from hip as per ortho, will still do CT hip to r/o infection  -no evident of discharge on exam 4/16, ID Dr Hicks     LOBITO (acute kidney injury).     2/2 prerenal azotemia vs cardiorenal syndrome  hold IVF for now given possible congestion noted on CXR  - cefepime renally dosed  -will repeat urine lytes and US renal   -renal Dr Sanchez     Diabetes.     BSL controlled  - npo, hss, decreased Lantus to 10 U qhs  hold oral hypoglycemics.      Essential hypertension.     on isosorbide and atenolol, Lasix.     Discussed with ICU attending Dr Rios, Dr William and floor resident and patient is medically stable to downgrade to floor. 89yo male from home, lives with wife, no home attendant, PMH CAD s/p CABG (12-13 years ago), s/p left hip pinning for left hip fracture (8 years ago), DM, HTN, BPH with urinary retention p/w worsening shortness of breath and altered mental status.  Intubated for hypercapneic respiratory failure 2/2 hypoventilation 2/2 likely aspiration pna vs pulmonary edema     Acute respiratory failure with hypoxia and hypercapnia.    2/2 hypoventilation possibly 2/2 aspiration pna vs CHF exacerbation  new right pleural effusion and bilateral congestion   - Intubated on 4/15 and extubated on 4/16(was on propofol for sedation)  - BNP 46548, f/u BCx  -DC Lasix, Cardio Dr Cantrell    Closed fracture of right hip with delayed healing,     -on vancomycin and cefepime started 4/15  Dr Lay seen and closed reduction done  -no concern for infection from hip as per ortho, will need CT hip to r/o infection  -no evident of discharge on exam 4/16, ID Dr Hicks     LOBITO (acute kidney injury).     2/2 prerenal azotemia vs cardiorenal syndrome  hold IVF for now given possible congestion noted on CXR  - cefepime renally dosed  -will repeat urine lytes and US renal   -renal Dr Sanchez     Diabetes.     BSL controlled  - npo, hss, decreased Lantus to 10 U qhs  hold oral hypoglycemics.      Essential hypertension.     on isosorbide and atenolol, Lasix.     Follow up with CT hip to rule out infection to prosthetic valve.   Discussed with ICU attending Dr Rios, Dr William and floor resident and patient is medically stable to downgrade to floor. 89yo male from home, lives with wife, no home attendant, PMH CAD s/p CABG (12-13 years ago), s/p left hip pinning for left hip fracture (8 years ago), DM, HTN, BPH with urinary retention p/w worsening shortness of breath and altered mental status.  Intubated for hypercapneic respiratory failure 2/2 hypoventilation 2/2 likely aspiration pna vs pulmonary edema     Acute respiratory failure with hypoxia and hypercapnia.    2/2 hypoventilation possibly 2/2 aspiration pna vs CHF exacerbation  new right pleural effusion and bilateral congestion   - Intubated on 4/15 and extubated on 4/16(was on propofol for sedation)  - BNP 01415, f/u BCx  -DC Lasix, Cardio Dr Cantrell    Closed fracture of right hip with delayed healing,     -on vancomycin and cefepime started 4/15  Dr Lay seen and closed reduction done  -no concern for infection from hip as per ortho, will need CT hip to r/o infection  -no evident of discharge on exam 4/16, ID Dr Hicks     LOBITO (acute kidney injury).     2/2 prerenal azotemia vs cardiorenal syndrome  hold IVF for now given possible congestion noted on CXR  - cefepime renally dosed  -will repeat urine lytes and US renal   -renal Dr Sanchez     Diabetes.     BSL controlled  - npo, hss, decreased Lantus to 10 U qhs  hold oral hypoglycemics.      Essential hypertension.     on isosorbide and atenolol, Lasix.     Follow up with CT hip to rule out infection to prosthetic hip replacement, US renal, vanco level in AM.   Discussed with ICU attending Dr Rios, Dr William and floor resident and patient is medically stable to downgrade to floor. 89yo male from home, lives with wife, no home attendant, PMH CAD s/p CABG (12-13 years ago), s/p left hip pinning for left hip fracture (8 years ago), DM, HTN, BPH with urinary retention p/w worsening shortness of breath and altered mental status.  Intubated for hypercapneic respiratory failure 2/2 hypoventilation 2/2 likely aspiration pna vs pulmonary edema     Acute respiratory failure with hypoxia and hypercapnia.    2/2 hypoventilation possibly 2/2 aspiration pna vs CHF exacerbation  new right pleural effusion and bilateral congestion   - Intubated on 4/15 and extubated on 4/16(was on propofol for sedation)  - BNP 02732, f/u BCx  -DC Lasix, Cardio Dr Cantrell    Closed fracture of right hip with delayed healing,     -on vancomycin and cefepime started 4/15  Dr Lay seen and closed reduction done  -no concern for infection from hip as per ortho, will need CT hip to r/o infection  -no evident of discharge on exam 4/16, ID Dr Hicks     LOBITO (acute kidney injury).     2/2 prerenal azotemia vs cardiorenal syndrome  hold IVF for now given possible congestion noted on CXR  - cefepime renally dosed  -will repeat urine lytes and US renal   -renal Dr Sanchez     Diabetes.     BSL controlled  - npo, hss, decreased Lantus to 10 U qhs  hold oral hypoglycemics.      Essential hypertension.     on isosorbide and atenolol, Lasix.     Follow up with CT hip to rule out infection to prosthetic hip replacement, US renal, vanco level in AM.   Discussed with ICU attending Dr Rios, Dr William and floor resident and patient is medically stable to transfer to floor.

## 2018-04-17 NOTE — PROGRESS NOTE ADULT - ATTENDING COMMENTS
pt seen and examined with renal resident  All vitals,meds and labs reviewed  pts renal function is mildly worsening , may secondary to diuresis  we will hold lasix and f/u renal function daily  Rest as per medical team

## 2018-04-17 NOTE — PROGRESS NOTE ADULT - ASSESSMENT
A/P:    1) LOBITO on CKD(stage 4 ?)  likely  pre-renal, with underlying ckd from diabetes and hypertensive disease  low albumin 1.9  -cxr from today continues to show volume overload on lasix 80 iv daily, reduced 2/2 worsening renal function; pro bnp 30,000 and vanco for ?hip infection  -monitor vanc trough  -holding iv fluids  urine output 300cc/hr  Cr 2.91 --> 3.09 --> 3.38 with GFR stage 4 CKD- 15  baseline Cr ~3  -Avoid Nephrotoxic Meds/ Agents such as (NSAIDs, IV contrast, Aminoglycosides such as gentamicin, -Gadolinium contrast, Phosphate containing enemas, etc..)  -Adjust Medications according to eGFR  -f/u bmp daily  -f/u I/O s daily  -obtain PTH and phosphorus level, obtain urine protein/Cr ratio and urine lytes and renal sono  2) HTN  -on atenolol and imdur  monitor BP  -keep bp<140/80  -add low salt diet  3) UTI: on iv antibiotics  4) ANEMIA:MF  -F/u Hb daily  -add epogen if anemia w/u c/w ckd  5)HYPONATREMIA:mild, mf like ckd/hypervolemia  -f/u Na level  daily A/P:    1) LOBITO on CKD(stage 4 ?)  likely  pre-renal, with underlying ckd from diabetes and hypertensive disease  low albumin 1.9  -advise to hold lasix 2/2 LOBITO  -pro bnp 30,000 and vanco for ?hip infection  -monitor vanc trough  -holding iv fluids  urine output 300cc/hr  Cr 2.91 --> 3.09 --> 3.38 with GFR stage 4 CKD- 15  baseline Cr ~3  -Avoid Nephrotoxic Meds/ Agents such as (NSAIDs, IV contrast, Aminoglycosides such as gentamicin, -Gadolinium contrast, Phosphate containing enemas, etc..)  -Adjust Medications according to eGFR  -f/u bmp daily  -f/u I/O s daily  -obtain PTH and phosphorus level, obtain urine protein/Cr ratio and urine lytes and renal sono  2) HTN  -on atenolol and imdur  monitor BP  -keep bp<140/80  -add low salt diet  3) UTI: on iv antibiotics  4) ANEMIA:MF  -F/u Hb daily  -add epogen if anemia w/u c/w ckd  5)HYPONATREMIA:mild, mf like ckd/hypervolemia  -f/u Na level  daily  6) Hyperphosphatemia  -advise to start phoslo 667 2 tab TID A/P:    1) LOBITO on CKD(stage 4 ?), pts egfr is mildly worsening today ? secondary to diuresis   likely  pre-renal, with underlying ckd from diabetes and hypertensive disease  low albumin 1.9  -advise to hold lasix 2/2 LOBITO  -pro bnp 30,000 and vanco for ?hip infection  -monitor vanc trough  -holding iv fluids  urine output 300cc/hr  Cr 2.91 --> 3.09 --> 3.38 with GFR stage 4 CKD- 15  baseline Cr ~3  -Avoid Nephrotoxic Meds/ Agents such as (NSAIDs, IV contrast, Aminoglycosides such as gentamicin, -Gadolinium contrast, Phosphate containing enemas, etc..)  -Adjust Medications according to eGFR  -f/u bmp daily  -f/u I/O s daily  -obtain PTH and phosphorus level, obtain urine protein/Cr ratio and urine lytes and renal sono  2) HTN  -on atenolol and imdur  monitor BP  -keep bp<140/80  -add low salt diet  3) UTI: on iv antibiotics  4) ANEMIA:MF  -F/u Hb daily  -add epogen if anemia w/u c/w ckd  5)HYPONATREMIA:mild, mf like ckd/hypervolemia  -f/u Na level  daily  6) Hyperphosphatemia  -advise to start phoslo 667 2 tab TID

## 2018-04-17 NOTE — CONSULT NOTE ADULT - SUBJECTIVE AND OBJECTIVE BOX
HPI:  89yo male from home, lives with wife, no home attendant, PMH CAD s/p CABG (12-13 years ago), s/p left hip pinning for left hip fracture (8 years ago), DM, HTN, BPH with urinary retention p/w worsening shortness of breath and altered mental status. History and ROS obtained from son (Esteban [phone], at bedside).  Patient was recently admitted to [facility] from 3/29 to 4/6/2018 for right hip fracture after mechanical fall.  Patient underwent right hip arthroplasty on 3/30 with Dr Sosa, complicated by ileus and E coli UTI, and discharged on 4/6 to home.  Patient has been receiving home PT.  2 days ago, while walking with therapist, patient endorsed shortness of breath and wheezing, resolved after rest.  The next day, son called patient's wife and was told he was doing well.  This early morning at 1am, grandson noticed that patient in distress with worsening sob and was disoriented.  911 called.  Son states that patient did not endorse fever, headache, change in vision or hearing, sore throat, chest pain, vomiting, diarrhea, hematochezia or hematuria.  Last colonoscopy 7 years ago. Endorses occasional nausea and 'gagging' with food.   h/o diabetes on insulin  pt was intubated/now extubated  has been npo  lantus held  3/29 TTE: grossly nl LV function    In ED, T 98.6 P 81 /80 RR 16    Patient was in CT scanner for head CT when patient became more short of breath and VBG returned [id number]  patient was intubated     CXR: ? RLL infiltrate vs effusion, prominent bilateral hilum  EKG: SR @ 81bpm, right axis deviation, RBBB, no change from previous EKG  T1 0.121, BNP (15 Apr 2018 05:37)      PAST MEDICAL & SURGICAL HISTORY:  HLD (hyperlipidemia)  Hip fracture, left  CAD (coronary artery disease): s/p by pass surgery  Diabetes  Hypertension  S/P CABG (coronary artery bypass graft)  History of hip surgery      No Known Allergies      aspirin  chewable 81 milliGRAM(s) Oral daily  ATENolol  Tablet 25 milliGRAM(s) Oral daily  atorvastatin 40 milliGRAM(s) Oral at bedtime  cefepime  IVPB 1000 milliGRAM(s) IV Intermittent every 24 hours  cefepime  IVPB      citalopram 20 milliGRAM(s) Oral daily  clopidogrel Tablet 75 milliGRAM(s) Oral daily  collagenase Ointment 1 Application(s) Topical daily  finasteride 5 milliGRAM(s) Oral daily  heparin  Injectable 5000 Unit(s) SubCutaneous every 8 hours  HYDROmorphone  Injectable 1 milliGRAM(s) IV Push every 4 hours PRN  insulin lispro (HumaLOG) corrective regimen sliding scale   SubCutaneous every 4 hours  isosorbide   mononitrate ER Tablet (IMDUR) 30 milliGRAM(s) Oral daily  pantoprazole  Injectable 40 milliGRAM(s) IV Push daily  tamsulosin 0.4 milliGRAM(s) Oral at bedtime  vancomycin  IVPB 500 milliGRAM(s) IV Intermittent every 24 hours      Social Hx:    FAMILY HISTORY:  No pertinent family history in first degree relatives      REVIEW OF SYSTEMS: pt cannot give details    CONSTITUTIONAL: No weakness, fevers or chills  EYES/ENT: No visual changes;  No vertigo or throat pain   NECK: No pain or stiffness  RESPIRATORY: No cough, wheezing, hemoptysis; No shortness of breath  CARDIOVASCULAR: No chest pain or palpitations  GASTROINTESTINAL: No abdominal or epigastric pain. No nausea, vomiting, or hematemesis; No diarrhea or constipation. No melena or hematochezia.  GENITOURINARY: No dysuria, frequency or hematuria  NEUROLOGICAL: No numbness or weakness  SKIN: No itching, burning, rashes, or lesions   All other review of systems is negative unless indicated above.  PHYSICAL EXAM:    Vital Signs Last 24 Hrs  T(C): 37.1 (17 Apr 2018 18:43), Max: 37.1 (17 Apr 2018 16:09)  T(F): 98.8 (17 Apr 2018 18:43), Max: 98.8 (17 Apr 2018 16:09)  HR: 70 (17 Apr 2018 18:43) (62 - 72)  BP: 110/50 (17 Apr 2018 18:43) (95/47 - 152/55)  BP(mean): 65 (17 Apr 2018 15:00) (59 - 107)  RR: 17 (17 Apr 2018 18:43) (11 - 24)  SpO2: 98% (17 Apr 2018 18:43) (98% - 100%)    Constitutional:    HEENT: elderly male  awake  lungs ae reduced  cardia reg  abd fullness  neuro deferred    Neck:  [  ] Supple  [  ]Lymphadenopathy  [  ] JVD   [  ]No JVD  [  ] Masses   [  ] WNL    CHEST/Respiratory:  [  ] Rales      [  ] Rhonchi      [  ] Wheezing       [  ] Chest Tenderness  [  ]Clear to auscultation    Cardiovascular:  [  ]S1 S2  [  ] Reg  [  ] Irreg   [  ] Murmurs  [  ]Systolic [  ]Diastolic  [  ]No Murmur    Abdomen:  [  ]  Bowel Sounds   [  ] Soft           [  ] ABD Distention  [  ] Tenderness  [  ] Organomegaly                        [  ] Guarding Rigidity  [  ] No Guarding Rigidity  [  ] Rebound Tenderness [  ] No Rebound Tenderness    Extremities: [  ] Edema  [  ] No edema  [  ] Clubbing   [  ] Cyanosis  [  ] Palpable peripheral pulses                        [  ] No Tender Calf muscles  [  ] Tender Calf muscles    Neurological:  [  ] Alert  [  ] Awake  [  ] Oriented  x                              [  ] Confused    Skin:  [  ] Thrombophlebitis  [  ] Rashes  [  ] Dry  [  ] Ulcers    Ortho:  [  ] Joint Swelling  [  ] Joint erythema [  ] DJD [  ] Increased temp. to touch      LABS/DIAGNOSTIC TESTS                          8.4    6.3   )-----------( 199      ( 17 Apr 2018 06:35 )             24.6     WBC Count: 6.3 K/uL (04-17 @ 06:35)  WBC Count: 6.5 K/uL (04-16 @ 04:50)  WBC Count: 9.3 K/uL (04-15 @ 03:08)      04-17    132<L>  |  97  |  38<H>  ----------------------------<  108<H>  4.5   |  27  |  3.38<H>    Ca    7.8<L>      17 Apr 2018 06:35  Phos  4.9     04-17  Mg     1.9     04-17    TPro  5.1<L>  /  Alb  1.9<L>  /  TBili  0.5  /  DBili  x   /  AST  18  /  ALT  10  /  AlkPhos  99  04-17          LIVER FUNCTIONS - ( 17 Apr 2018 06:35 )  Alb: 1.9 g/dL / Pro: 5.1 g/dL / ALK PHOS: 99 U/L / ALT: 10 U/L DA / AST: 18 U/L / GGT: x                 LACTATE:      CULTURES:   aspirin  chewable 81 milliGRAM(s) Oral daily  ATENolol  Tablet 25 milliGRAM(s) Oral daily  atorvastatin 40 milliGRAM(s) Oral at bedtime  cefepime  IVPB 1000 milliGRAM(s) IV Intermittent every 24 hours  cefepime  IVPB      citalopram 20 milliGRAM(s) Oral daily  clopidogrel Tablet 75 milliGRAM(s) Oral daily  collagenase Ointment 1 Application(s) Topical daily  finasteride 5 milliGRAM(s) Oral daily  heparin  Injectable 5000 Unit(s) SubCutaneous every 8 hours  HYDROmorphone  Injectable 1 milliGRAM(s) IV Push every 4 hours PRN  insulin lispro (HumaLOG) corrective regimen sliding scale   SubCutaneous every 4 hours  isosorbide   mononitrate ER Tablet (IMDUR) 30 milliGRAM(s) Oral daily  pantoprazole  Injectable 40 milliGRAM(s) IV Push daily  tamsulosin 0.4 milliGRAM(s) Oral at bedtime  vancomycin  IVPB 500 milliGRAM(s) IV Intermittent every 24 hours      RADIOLOGY    CXR:

## 2018-04-17 NOTE — PROGRESS NOTE ADULT - SUBJECTIVE AND OBJECTIVE BOX
Meds:  cefepime  IVPB 1000 milliGRAM(s) IV Intermittent every 24 hours  Vancomycin 500 mg IVPB q day.    Allergies:  Allergies    No Known Allergies    Intolerances        ROS  [  ] UNABLE TO ELICIT    General:  [  ] None  [  ] Fever  [  ] Chills  [ x ] Malaise    Skin:  [ x ] None [  ] Rash  [  ] Wound  [  ] Ulcer    HEENT:  [ x ] None  [  ] Sore Throat  [  ] Nasal congestion/ runny nose  [  ] Photophobia [  ] Neck pain      Chest:  [  ] None   [  ] SOB  [ x ] Cough  [  ] None    Cardiovascular:   [ x ] None  [  ] CP  [  ] Palpitation    Gastrointestinal:  [ x ] None  [  ] Abd pain   [  ] Nausea    [  ] Vomiting   [  ] Diarrhea	     Genitourinary:  [ x ] None [  ] Polyuria   [  ] Urgency  [  ] Frequency  [  ] Dysuria    [  ]  Hematuria       Musculoskeletal:  [  ] None [  ] Back Pain	[  ] Body aches  [  ] Joint pain [ x ] Weakness    Neurological: [  ] None [  ]Dizziness  [  ]Visual Disturbance  [  ]Headaches   [ x ] Weakness          PHYSICAL EXAM:    Vital Signs Last 24 Hrs  T(C): 35.8 (16 Apr 2018 00:00), Max: 36.4 (15 Apr 2018 19:39)  T(F): 96.5 (16 Apr 2018 00:00), Max: 97.6 (15 Apr 2018 19:39)  HR: 61 (16 Apr 2018 05:49) (57 - 73)  BP: 110/53 (16 Apr 2018 04:00) (92/40 - 135/56)  BP(mean): 67 (16 Apr 2018 04:00) (54 - 72)  RR: 12 (16 Apr 2018 04:00) (12 - 19)  SpO2: 100% (16 Apr 2018 05:49) (98% - 100%)    Constitutional:    HEENT: [ x ] Wnl  [  ] Pharyngeal congestion [ x ] Extubated.    Neck:  [ x ] Supple  [  ]Lymphadenopathy  [ x ] No JVD   [  ] JVD  [  ] Masses   [  ] WNL    CHEST/Respiratory:  [  ]Clear to auscultation  [ x ] Rales   [  ] Rhonchi   [  ] Wheezing     [  ] Chest Tenderness      Cardiovascular:  [ x ] Reg S1 S2   [  ] Irreg S1 S2   [ x ]No Murmur  [  ] +ve Murmurs  [  ]Systolic [  ]Diastolic      Abdomen:  [ x ] Soft  [ x ] No tendrerness  [  ] Tenderness  [  ] Organomegaly  [  ] ABD Distention  [  ] Rigidity                       [ x ] No Regidity                       [ x ] No Rebound Tenderness  [  ] No Guarding Rigidity  [  ] Rebound Tenderness[  ] Guarding Rigidity                          [ x ]  +ve Bowel Sounds  [  ] Decreased Bowel Sounds    [  ] Absent Bowel Sounds                            Extremities: [  ] No edema [ x ] Edema  [  ] Clubbing   [  ] Cyanosis                         [ x ] No Tender Calf muscles  [  ] Tender Calf muscles                        [ x ] Palpable peripheral pulses    Neurological: [  ] Awake  [  ] Alert  [  ] Oriented  x                             [  ] Confused  [  ] Drowsy  [ x ] respond to painful stimuli  [  ] Unresponsive    Skin:  [  ] Intact [  ] Redness [  ] Thrombophlebitis  [  ] Rashes  [  ] Dry  [ x ] Right hip surgical dry wound, and staples in place    Ortho:  [  ] Joint Swelling  [  ] Joint erythema [  ] Joint tenderness                [  ] Increased temp. to touch  [  ] DJD [ x ] WNL          LABS/DIAGNOSTIC TESTS                          8.4    6.5   )-----------( 193      ( 16 Apr 2018 04:50 )             25.3         04-16    133<L>  |  98  |  29<H>  ----------------------------<  191<H>  4.3   |  26  |  3.09<H>    Ca    7.2<L>      16 Apr 2018 04:50  Phos  4.2     04-16  Mg     1.9     04-16    TPro  5.6<L>  /  Alb  2.4<L>  /  TBili  0.3  /  DBili  x   /  AST  26  /  ALT  15  /  AlkPhos  96  04-15      LIVER FUNCTIONS - ( 15 Apr 2018 03:08 )  Alb: 2.4 g/dL / Pro: 5.6 g/dL / ALK PHOS: 96 U/L / ALT: 15 U/L DA / AST: 26 U/L / GGT: x             CULTURES:     Culture - Blood (04.15.18 @ 12:42)    Specimen Source: .Blood Blood    Culture Results:   No growth to date.    Culture - Blood (04.15.18 @ 12:41)    Specimen Source: .Blood Blood    Culture Results:   No growth to date.    Culture - Urine (04.15.18 @ 12:09)    Specimen Source: .Urine Clean Catch (Midstream)    Culture Results:   No growth          RADIOLOGY    CXR:    EXAM:  XR CHEST AP OR PA 1V                            PROCEDURE DATE:  04/15/2018          INTERPRETATION:  PORTABLE CHEST X-RAY    HISTORY: s/p intubation, confirm placement.    COMPARISON: 4/15/2018.    FINDINGS/  IMPRESSION:  Interval intubation with ET tube tip approximately 3.3 cm above the   riaz. No pneumothorax.    Pulmonary vascular congestion, worsening bilateral perihilar airspace   opacities. Small bilateral pleural effusions are unchanged.    The cardiac silhouette is not accurately assessed by AP technique. Aortic   calcifications. Sternotomy wires.        EXAM:  CT BRAIN                            PROCEDURE DATE:  04/15/2018          INTERPRETATION:  CLINICAL INFORMATION: Altered mental status.    COMPARISON: None available.    PROCEDURE:   Noncontrast CT of the Head was performed from the skull base to the   vertex. Coronal and Sagittal reformats were obtained.    FINDINGS:    There is no acute intracranial hemorrhage, mass effect, midline shift,   extra-axial collection, hydrocephalus, or evidence of acute vascular   territorial infarction. Mild patchy hypodensities within the   periventricular and subcortical white matter, although nonspecific,   likely reflect chronic microvascular disease. Cerebral volume loss   results in prominence of the ventricles and sulci.    The visualized paranasal sinuses and mastoid air cells are clear.   Visualized osseous structures are intact.    IMPRESSION:     No acute intracranial hemorrhage, mass effect, or evidence of acute   vascular territorial infarction.    If clinical symptoms persist or worsen, short-term repeat CT head or more   sensitive evaluation with brain MRI may be obtained, if no   contraindications exist.          Assessment and Recommendation:   89yo male from home, lives with wife, no home attendant, PMH CAD s/p CABG (12-13 years ago), s/p left hip pinning for left hip fracture (8 years ago), DM, HTN, BPH with urinary retention p/w worsening shortness of breath and altered mental status. History and ROS obtained from son (Esteban [phone], at bedside).  Patient was recently admitted to [facility] from 3/29 to 4/6/2018 for right hip fracture after mechanical fall.   Patient was intubated and admitted to ICU and was given Vancomycin and Cefepime.  4/16/18 Patient was extubated and tolerated it well    Problem/Recommendation - 1:  Problem: Pneumonia, bacterial.   Recommendation:   1- UA & CS.  2- Follow Blood culture to final report.  3- Continue Vancomycin 500 mg IVPB q day.  4- Continue IV Cefepime 1 gm IVPB q day.  5- Fluid and electrolytes management.  6- CBC and BMP follow up.   7- Follow Vancomycin trough closely and adjust Vancomycin dose accordingly.    Problem/Recommendation - 2:  ·  Problem: Postoperative wound infection, sequela.    Recommendation:   1- Orthopedic evaluation, and follow up.  2- ESR and CRP.  3- Wound care right hip.     Problem/Recommendation - 3:  ·  Problem: Type 2 diabetes mellitus with stage 3 chronic kidney disease, unspecified long term insulin use status.    Recommendation:   1- Blood sugar monitoring and control.  2- Accu-Cheks with coverage.  3- 1800 karolyn ADA diet.  4- Follow HB A1C.     Problem/Recommendation - 4:  ·  Problem: Essential hypertension.    Recommendation:   1- Monitor Blood pressure closely.  2- Blood pressure control.  3- BP. meds as per cardiology and primary care team.     Discussed with medical resident.

## 2018-04-17 NOTE — CONSULT NOTE ADULT - PROBLEM SELECTOR RECOMMENDATION 9
2/2 hypoventilation possibly 2/2 aspiration pna. Pt extubated; now on NC. On IV abx. Full code on file.
1- UA & CS.  2- Follow Blood culture to final report.  3- Continue Vancomycin, but obtain trough first, and give Vancomycin 500 mg IVPB q day if trough is < 20.  4- Continue IV Cefepime 1 gm IVPB q day.  5- Fluid and electrolytes management.  6- CBC and BMP follow up.   7- Follow Vancomycin trough closely and adjust Vancomycin dose accordingly.

## 2018-04-17 NOTE — PROGRESS NOTE ADULT - ASSESSMENT
89yo male from home, lives with wife, no home attendant, PMH CAD s/p CABG (12-13 years ago), s/p left hip pinning for left hip fracture (8 years ago), DM, HTN, BPH with urinary retention p/w worsening shortness of breath and altered mental status.  Intubated for hypercapneic respiratory failure 2/2 hypoventilation 2/2 likely aspiration pna vs pulmonary edema     Acute respiratory failure with hypoxia and hypercapnia.    2/2 hypoventilation possibly 2/2 aspiration pna vs CHF exacerbation  new right pleural effusion and bilateral congestion   - Intubated on 4/15 and extubated on 4/16(was on propofol for sedation)  - BNP 59669, f/u BCx  -On Lasix 60IV BID and comfortable and saturating well on nasal cannula.     Closed fracture of right hip with delayed healing,     -on vancomycin and cefepime started 4/15  Dr Lay seen and closed reduction done  -no concern for infection from hip as per ortho  -no evident of discharge on exam 4/16, ID Dr Hicks     LOBITO (acute kidney injury).     2/2 prerenal azotemia vs cardiorenal syndrome  hold IVF for now given possible congestion noted on CXR  - cefepime renally dosed  -renal Dr Sanchez     Diabetes.     BSL controlled  - npo, hss, decreased Lantus to 10 U qhs  hold oral hypoglycemics.      Essential hypertension.     on isosorbide and atenolol, lasix. 89yo male from home, lives with wife, no home attendant, PMH CAD s/p CABG (12-13 years ago), s/p left hip pinning for left hip fracture (8 years ago), DM, HTN, BPH with urinary retention p/w worsening shortness of breath and altered mental status.  Intubated for hypercapneic respiratory failure 2/2 hypoventilation 2/2 likely aspiration pna vs pulmonary edema    Acute respiratory failure with hypoxia and hypercapnia.    2/2 hypoventilation possibly 2/2 aspiration pna vs CHF exacerbation  new right pleural effusion and bilateral congestion   - Intubated on 4/15 and extubated on 4/16(was on propofol for sedation)  - BNP 64125, f/u BCx  -Decreased Lasix to 80 IV daily     Closed fracture of right hip with delayed healing,     -on vancomycin and cefepime started 4/15  Dr Lay seen and closed reduction done  -no concern for infection from hip as per ortho, will still do CT hip to r/o infection  -no evident of discharge on exam 4/16, ID Dr Hicks     LOBITO (acute kidney injury).     2/2 prerenal azotemia vs cardiorenal syndrome  hold IVF for now given possible congestion noted on CXR  - cefepime renally dosed  -will repeat urine lytes and US renal   -renal Dr Sanchez     Diabetes.     BSL controlled  - npo, hss, decreased Lantus to 10 U qhs  hold oral hypoglycemics.      Essential hypertension.     on isosorbide and atenolol, lasix.

## 2018-04-17 NOTE — CONSULT NOTE ADULT - PROBLEM SELECTOR PROBLEM 3
Type 2 diabetes mellitus with stage 3 chronic kidney disease, unspecified long term insulin use status
Debility

## 2018-04-17 NOTE — CONSULT NOTE ADULT - ASSESSMENT
1.diabetes mellitus  s/p resp failure  s/p hip surg  s/p recent ileus  pt presently npo  arf/ckd  sugars 100-225  cont humalog cov every 4 hours  restart lantus 10 units with meals  will follow

## 2018-04-18 DIAGNOSIS — Z29.9 ENCOUNTER FOR PROPHYLACTIC MEASURES, UNSPECIFIED: ICD-10-CM

## 2018-04-18 DIAGNOSIS — I10 ESSENTIAL (PRIMARY) HYPERTENSION: ICD-10-CM

## 2018-04-18 DIAGNOSIS — T14.8XXA OTHER INJURY OF UNSPECIFIED BODY REGION, INITIAL ENCOUNTER: ICD-10-CM

## 2018-04-18 LAB
ALBUMIN SERPL ELPH-MCNC: 2.1 G/DL — LOW (ref 3.5–5)
ALP SERPL-CCNC: 71 U/L — SIGNIFICANT CHANGE UP (ref 40–120)
ALT FLD-CCNC: 7 U/L DA — LOW (ref 10–60)
ANION GAP SERPL CALC-SCNC: 13 MMOL/L — SIGNIFICANT CHANGE UP (ref 5–17)
AST SERPL-CCNC: 8 U/L — LOW (ref 10–40)
BILIRUB SERPL-MCNC: 1 MG/DL — SIGNIFICANT CHANGE UP (ref 0.2–1.2)
BUN SERPL-MCNC: 83 MG/DL — HIGH (ref 7–18)
CALCIUM SERPL-MCNC: 7.5 MG/DL — LOW (ref 8.4–10.5)
CALCIUM SERPL-MCNC: 7.8 MG/DL — LOW (ref 8.4–10.5)
CHLORIDE SERPL-SCNC: 110 MMOL/L — HIGH (ref 96–108)
CO2 SERPL-SCNC: 18 MMOL/L — LOW (ref 22–31)
CREAT SERPL-MCNC: 3.31 MG/DL — HIGH (ref 0.5–1.3)
GLUCOSE BLDC GLUCOMTR-MCNC: 144 MG/DL — HIGH (ref 70–99)
GLUCOSE BLDC GLUCOMTR-MCNC: 167 MG/DL — HIGH (ref 70–99)
GLUCOSE BLDC GLUCOMTR-MCNC: 170 MG/DL — HIGH (ref 70–99)
GLUCOSE BLDC GLUCOMTR-MCNC: 202 MG/DL — HIGH (ref 70–99)
GLUCOSE BLDC GLUCOMTR-MCNC: 215 MG/DL — HIGH (ref 70–99)
GLUCOSE BLDC GLUCOMTR-MCNC: 245 MG/DL — HIGH (ref 70–99)
GLUCOSE SERPL-MCNC: 110 MG/DL — HIGH (ref 70–99)
HCT VFR BLD CALC: 22.4 % — LOW (ref 39–50)
HGB BLD-MCNC: 7.6 G/DL — LOW (ref 13–17)
MCHC RBC-ENTMCNC: 28.6 PG — SIGNIFICANT CHANGE UP (ref 27–34)
MCHC RBC-ENTMCNC: 34 GM/DL — SIGNIFICANT CHANGE UP (ref 32–36)
MCV RBC AUTO: 84 FL — SIGNIFICANT CHANGE UP (ref 80–100)
PLATELET # BLD AUTO: 299 K/UL — SIGNIFICANT CHANGE UP (ref 150–400)
POTASSIUM SERPL-MCNC: 3.8 MMOL/L — SIGNIFICANT CHANGE UP (ref 3.5–5.3)
POTASSIUM SERPL-SCNC: 3.8 MMOL/L — SIGNIFICANT CHANGE UP (ref 3.5–5.3)
PROCALCITONIN SERPL-MCNC: 0.36 NG/ML — HIGH (ref 0–0.04)
PROT SERPL-MCNC: 5.8 G/DL — LOW (ref 6–8.3)
PTH-INTACT FLD-MCNC: 161 PG/ML — HIGH (ref 15–65)
RBC # BLD: 2.67 M/UL — LOW (ref 4.2–5.8)
RBC # FLD: 14.3 % — SIGNIFICANT CHANGE UP (ref 10.3–14.5)
SODIUM SERPL-SCNC: 141 MMOL/L — SIGNIFICANT CHANGE UP (ref 135–145)
VANCOMYCIN TROUGH SERPL-MCNC: 14.8 UG/ML — SIGNIFICANT CHANGE UP (ref 10–20)
WBC # BLD: 13.8 K/UL — HIGH (ref 3.8–10.5)
WBC # FLD AUTO: 13.8 K/UL — HIGH (ref 3.8–10.5)

## 2018-04-18 PROCEDURE — 99231 SBSQ HOSP IP/OBS SF/LOW 25: CPT

## 2018-04-18 PROCEDURE — 73700 CT LOWER EXTREMITY W/O DYE: CPT | Mod: 26,RT

## 2018-04-18 RX ORDER — INSULIN GLARGINE 100 [IU]/ML
5 INJECTION, SOLUTION SUBCUTANEOUS EVERY MORNING
Qty: 0 | Refills: 0 | Status: DISCONTINUED | OUTPATIENT
Start: 2018-04-18 | End: 2018-04-19

## 2018-04-18 RX ORDER — INSULIN LISPRO 100/ML
VIAL (ML) SUBCUTANEOUS
Qty: 0 | Refills: 0 | Status: DISCONTINUED | OUTPATIENT
Start: 2018-04-18 | End: 2018-04-20

## 2018-04-18 RX ORDER — IRON SUCROSE 20 MG/ML
100 INJECTION, SOLUTION INTRAVENOUS EVERY 24 HOURS
Qty: 0 | Refills: 0 | Status: DISCONTINUED | OUTPATIENT
Start: 2018-04-18 | End: 2018-04-20

## 2018-04-18 RX ORDER — ERYTHROPOIETIN 10000 [IU]/ML
6000 INJECTION, SOLUTION INTRAVENOUS; SUBCUTANEOUS
Qty: 0 | Refills: 0 | Status: DISCONTINUED | OUTPATIENT
Start: 2018-04-18 | End: 2018-04-20

## 2018-04-18 RX ORDER — CALCIUM ACETATE 667 MG
1334 TABLET ORAL
Qty: 0 | Refills: 0 | Status: DISCONTINUED | OUTPATIENT
Start: 2018-04-18 | End: 2018-04-19

## 2018-04-18 RX ADMIN — FINASTERIDE 5 MILLIGRAM(S): 5 TABLET, FILM COATED ORAL at 13:59

## 2018-04-18 RX ADMIN — TAMSULOSIN HYDROCHLORIDE 0.4 MILLIGRAM(S): 0.4 CAPSULE ORAL at 22:04

## 2018-04-18 RX ADMIN — CITALOPRAM 20 MILLIGRAM(S): 10 TABLET, FILM COATED ORAL at 13:58

## 2018-04-18 RX ADMIN — Medication 1: at 06:15

## 2018-04-18 RX ADMIN — CLOPIDOGREL BISULFATE 75 MILLIGRAM(S): 75 TABLET, FILM COATED ORAL at 13:59

## 2018-04-18 RX ADMIN — HEPARIN SODIUM 5000 UNIT(S): 5000 INJECTION INTRAVENOUS; SUBCUTANEOUS at 06:12

## 2018-04-18 RX ADMIN — IRON SUCROSE 210 MILLIGRAM(S): 20 INJECTION, SOLUTION INTRAVENOUS at 17:05

## 2018-04-18 RX ADMIN — HEPARIN SODIUM 5000 UNIT(S): 5000 INJECTION INTRAVENOUS; SUBCUTANEOUS at 22:04

## 2018-04-18 RX ADMIN — INSULIN GLARGINE 5 UNIT(S): 100 INJECTION, SOLUTION SUBCUTANEOUS at 22:18

## 2018-04-18 RX ADMIN — Medication 2: at 17:06

## 2018-04-18 RX ADMIN — Medication 1 APPLICATION(S): at 13:59

## 2018-04-18 RX ADMIN — Medication 100 MILLIGRAM(S): at 06:58

## 2018-04-18 RX ADMIN — Medication 81 MILLIGRAM(S): at 14:00

## 2018-04-18 RX ADMIN — ISOSORBIDE MONONITRATE 30 MILLIGRAM(S): 60 TABLET, EXTENDED RELEASE ORAL at 13:59

## 2018-04-18 RX ADMIN — Medication 2: at 11:21

## 2018-04-18 RX ADMIN — CEFEPIME 100 MILLIGRAM(S): 1 INJECTION, POWDER, FOR SOLUTION INTRAMUSCULAR; INTRAVENOUS at 06:13

## 2018-04-18 RX ADMIN — ATORVASTATIN CALCIUM 40 MILLIGRAM(S): 80 TABLET, FILM COATED ORAL at 22:04

## 2018-04-18 RX ADMIN — ERYTHROPOIETIN 6000 UNIT(S): 10000 INJECTION, SOLUTION INTRAVENOUS; SUBCUTANEOUS at 17:05

## 2018-04-18 RX ADMIN — PANTOPRAZOLE SODIUM 40 MILLIGRAM(S): 20 TABLET, DELAYED RELEASE ORAL at 13:59

## 2018-04-18 RX ADMIN — ATENOLOL 25 MILLIGRAM(S): 25 TABLET ORAL at 06:14

## 2018-04-18 RX ADMIN — Medication 1334 MILLIGRAM(S): at 17:07

## 2018-04-18 RX ADMIN — Medication 1334 MILLIGRAM(S): at 13:59

## 2018-04-18 RX ADMIN — HEPARIN SODIUM 5000 UNIT(S): 5000 INJECTION INTRAVENOUS; SUBCUTANEOUS at 13:59

## 2018-04-18 NOTE — CHART NOTE - NSCHARTNOTEFT_GEN_A_CORE
Assessment:  Downgraded from ICU, s/p extubation, NGT in place, TF progressing and tolerated well per RN;     Factors impacting intake: [ ] none [ ] nausea  [ ] vomiting [ ] diarrhea [ ] constipation  [ ]chewing problems [ ] swallowing issues  [ ] other:  pending swallow evaluation     Diet Presciption: Diet, Dysphagia 1 Pureed-No Liquids:   Consistent Carbohydrate {Evening Snacks}  DASH/TLC {Sodium & Cholesterol Restricted} (04-18-18 @ 10:16)    Intake:     Current Weight: Weight (kg): 67 (04-15 @ 07:00)  % Weight Change    Pertinent Medications: MEDICATIONS  (STANDING):  aspirin  chewable 81 milliGRAM(s) Oral daily  ATENolol  Tablet 25 milliGRAM(s) Oral daily  atorvastatin 40 milliGRAM(s) Oral at bedtime  cefepime  IVPB 1000 milliGRAM(s) IV Intermittent every 24 hours  cefepime  IVPB      citalopram 20 milliGRAM(s) Oral daily  clopidogrel Tablet 75 milliGRAM(s) Oral daily  collagenase Ointment 1 Application(s) Topical daily  finasteride 5 milliGRAM(s) Oral daily  heparin  Injectable 5000 Unit(s) SubCutaneous every 8 hours  insulin glargine Injectable (LANTUS) 5 Unit(s) SubCutaneous every morning  insulin lispro (HumaLOG) corrective regimen sliding scale   SubCutaneous every 4 hours  isosorbide   mononitrate ER Tablet (IMDUR) 30 milliGRAM(s) Oral daily  pantoprazole  Injectable 40 milliGRAM(s) IV Push daily  tamsulosin 0.4 milliGRAM(s) Oral at bedtime  vancomycin  IVPB 500 milliGRAM(s) IV Intermittent every 24 hours    MEDICATIONS  (PRN):  HYDROmorphone  Injectable 1 milliGRAM(s) IV Push every 4 hours PRN Severe Pain (7 - 10)    Pertinent Labs: 04-18 Na141 mmol/L Glu 110 mg/dL<H> K+ 3.8 mmol/L Cr  3.31 mg/dL<H> BUN 83 mg/dL<H> 04-17 Phos 4.9 mg/dL<H> 04-18 Alb 2.1 g/dL<L> 04-02 [id number] 5.6 % 03-30 Chol 120 mg/dL LDL 39 mg/dL HDL 55 mg/dL Trig 130 mg/dL     CAPILLARY BLOOD GLUCOSE      POCT Blood Glucose.: 167 mg/dL (18 Apr 2018 07:50)  POCT Blood Glucose.: 170 mg/dL (18 Apr 2018 06:07)  POCT Blood Glucose.: 144 mg/dL (18 Apr 2018 02:04)  POCT Blood Glucose.: 155 mg/dL (17 Apr 2018 22:02)  POCT Blood Glucose.: 171 mg/dL (17 Apr 2018 18:17)  POCT Blood Glucose.: 242 mg/dL (17 Apr 2018 14:19)    Skin:     Estimated Needs:   [ ] no change since previous assessment  [ ] recalculated:     Previous Nutrition Diagnosis:   [ ] Inadequate Energy Intake [ ]Inadequate Oral Intake [ ] Excessive Energy Intake   [ ] Underweight [ ] Increased Nutrient Needs [ ] Overweight/Obesity   [ ] Altered GI Function [ ] Unintended Weight Loss [ ] Food & Nutrition Related Knowledge Deficit [ ] Malnutrition     Nutrition Diagnosis is [ ] ongoing  [ ] resolved [ ] not applicable     New Nutrition Diagnosis: [ ] not applicable       Interventions:   Recommend  [ ] Change Diet To:  [ ] Nutrition Supplement  [ ] Nutrition Support  [ ] Other:     Monitoring and Evaluation:   [ ] PO intake [ x ] Tolerance to diet prescription [ x ] weights [ x ] labs[ x ] follow up per protocol  [ ] other: Assessment:  Downgraded from ICU, s/p extubation, pt visited in morning, NGT was in place, TF progressing and tolerated well per RN; Now to discontinue NGT and po diet ordered by MD     Factors impacting intake: [ ] none [ ] nausea  [ ] vomiting [ ] diarrhea [ ] constipation  [ ]chewing problems [ ] swallowing issues  [ ] other:  pending swallow evaluation     Diet Presciption: Diet, Dysphagia 1 Pureed-No Liquids:   Consistent Carbohydrate {Evening Snacks}  DASH/TLC {Sodium & Cholesterol Restricted} (04-18-18 @ 10:16)    Intake: po diet just started today     Current Weight: Weight (kg): 147.7 lb 4/15/18-->141.8 lb 4/18/18, ? wt changes, may due to fluid/scale   % Weight Change    Pertinent Medications: MEDICATIONS  (STANDING):  aspirin  chewable 81 milliGRAM(s) Oral daily  ATENolol  Tablet 25 milliGRAM(s) Oral daily  atorvastatin 40 milliGRAM(s) Oral at bedtime  cefepime  IVPB 1000 milliGRAM(s) IV Intermittent every 24 hours  cefepime  IVPB      citalopram 20 milliGRAM(s) Oral daily  clopidogrel Tablet 75 milliGRAM(s) Oral daily  collagenase Ointment 1 Application(s) Topical daily  finasteride 5 milliGRAM(s) Oral daily  heparin  Injectable 5000 Unit(s) SubCutaneous every 8 hours  insulin glargine Injectable (LANTUS) 5 Unit(s) SubCutaneous every morning  insulin lispro (HumaLOG) corrective regimen sliding scale   SubCutaneous every 4 hours  isosorbide   mononitrate ER Tablet (IMDUR) 30 milliGRAM(s) Oral daily  pantoprazole  Injectable 40 milliGRAM(s) IV Push daily  tamsulosin 0.4 milliGRAM(s) Oral at bedtime  vancomycin  IVPB 500 milliGRAM(s) IV Intermittent every 24 hours    MEDICATIONS  (PRN):  HYDROmorphone  Injectable 1 milliGRAM(s) IV Push every 4 hours PRN Severe Pain (7 - 10)    Pertinent Labs: 04-18 Na141 mmol/L Glu 110 mg/dL<H> K+ 3.8 mmol/L Cr  3.31 mg/dL<H> BUN 83 mg/dL<H> 04-17 Phos 4.9 mg/dL<H> 04-18 Alb 2.1 g/dL<L> 04-02 [id number] 5.6 % 03-30 Chol 120 mg/dL LDL 39 mg/dL HDL 55 mg/dL Trig 130 mg/dL     CAPILLARY BLOOD GLUCOSE      POCT Blood Glucose.: 167 mg/dL (18 Apr 2018 07:50)  POCT Blood Glucose.: 170 mg/dL (18 Apr 2018 06:07)  POCT Blood Glucose.: 144 mg/dL (18 Apr 2018 02:04)  POCT Blood Glucose.: 155 mg/dL (17 Apr 2018 22:02)  POCT Blood Glucose.: 171 mg/dL (17 Apr 2018 18:17)  POCT Blood Glucose.: 242 mg/dL (17 Apr 2018 14:19)    Skin:  pressure ulcer unstagable x 2     Estimated Needs:   [ X ] no change since previous assessment  [ ] recalculated:     Previous Nutrition Diagnosis:   [ ] Inadequate Energy Intake [ ]Inadequate Oral Intake [ ] Excessive Energy Intake [ X ] Swallowing difficult   [ ] Underweight [ ] Increased Nutrient Needs [ ] Overweight/Obesity   [ ] Altered GI Function [ ] Unintended Weight Loss [ ] Food & Nutrition Related Knowledge Deficit [ ] Malnutrition     Nutrition Diagnosis is [ X ] ongoing  [ ] resolved [ ] not applicable     New Nutrition Diagnosis: [ ] not applicable       Interventions:   Recommend  [ X ] Change Diet To: pending swallow evaluation   [ X ] Nutrition Supplement: May consider Add Ensure Pudding 120ml x tid (510kcal 12g protein) if medically feasible   [ ] Nutrition Support  [ ] Other:     Monitoring and Evaluation:   [ X ] PO intake [ x ] Tolerance to diet prescription [ x ] weights [ x ] labs[ x ] follow up per protocol  [ ] other: Assessment:  Downgraded from ICU, s/p extubation, pt visited in morning, NGT was in place, TF progressing and tolerated well per RN; Now to discontinue NGT and po diet ordered by MD     Factors impacting intake: [ ] none [ ] nausea  [ ] vomiting [ ] diarrhea [ ] constipation  [ ]chewing problems [ ] swallowing issues  [ ] other:  pending swallow evaluation     Diet Presciption: Diet, Dysphagia 1 Pureed-No Liquids:   Consistent Carbohydrate {Evening Snacks}  DASH/TLC {Sodium & Cholesterol Restricted} (04-18-18 @ 10:16)    Intake: po diet just started today     Current Weight: Weight (kg): 147.7 lb 4/15/18-->141.8 lb 4/18/18, ? wt changes, may due to fluid/scale   % Weight Change    Pertinent Medications: MEDICATIONS  (STANDING):  aspirin  chewable 81 milliGRAM(s) Oral daily  ATENolol  Tablet 25 milliGRAM(s) Oral daily  atorvastatin 40 milliGRAM(s) Oral at bedtime  cefepime  IVPB 1000 milliGRAM(s) IV Intermittent every 24 hours  cefepime  IVPB      citalopram 20 milliGRAM(s) Oral daily  clopidogrel Tablet 75 milliGRAM(s) Oral daily  collagenase Ointment 1 Application(s) Topical daily  finasteride 5 milliGRAM(s) Oral daily  heparin  Injectable 5000 Unit(s) SubCutaneous every 8 hours  insulin glargine Injectable (LANTUS) 5 Unit(s) SubCutaneous every morning  insulin lispro (HumaLOG) corrective regimen sliding scale   SubCutaneous every 4 hours  isosorbide   mononitrate ER Tablet (IMDUR) 30 milliGRAM(s) Oral daily  pantoprazole  Injectable 40 milliGRAM(s) IV Push daily  tamsulosin 0.4 milliGRAM(s) Oral at bedtime  vancomycin  IVPB 500 milliGRAM(s) IV Intermittent every 24 hours    MEDICATIONS  (PRN):  HYDROmorphone  Injectable 1 milliGRAM(s) IV Push every 4 hours PRN Severe Pain (7 - 10)    Pertinent Labs: 04-18 Na141 mmol/L Glu 110 mg/dL<H> K+ 3.8 mmol/L Cr  3.31 mg/dL<H> BUN 83 mg/dL<H> 04-17 Phos 4.9 mg/dL<H> 04-18 Alb 2.1 g/dL<L> 04-02 [id number] 5.6 % 03-30 Chol 120 mg/dL LDL 39 mg/dL HDL 55 mg/dL Trig 130 mg/dL     CAPILLARY BLOOD GLUCOSE      POCT Blood Glucose.: 167 mg/dL (18 Apr 2018 07:50)  POCT Blood Glucose.: 170 mg/dL (18 Apr 2018 06:07)  POCT Blood Glucose.: 144 mg/dL (18 Apr 2018 02:04)  POCT Blood Glucose.: 155 mg/dL (17 Apr 2018 22:02)  POCT Blood Glucose.: 171 mg/dL (17 Apr 2018 18:17)  POCT Blood Glucose.: 242 mg/dL (17 Apr 2018 14:19)    Skin:  pressure ulcer unstagable x 2     Estimated Needs:   [ X ] no change since previous assessment  [ ] recalculated:     Previous Nutrition Diagnosis:   [ ] Inadequate Energy Intake [ ]Inadequate Oral Intake [ ] Excessive Energy Intake [ X ] Swallowing difficult   [ ] Underweight [ ] Increased Nutrient Needs [ ] Overweight/Obesity   [ ] Altered GI Function [ ] Unintended Weight Loss [ ] Food & Nutrition Related Knowledge Deficit [ ] Malnutrition     Nutrition Diagnosis is [ X ] ongoing  [ ] resolved [ ] not applicable     New Nutrition Diagnosis: [ ] not applicable       Interventions:   Recommend  [ X ] Change Diet To: pending swallow evaluation   [ X ] Nutrition Supplement: May consider Add Ensure Pudding 120ml x tid (510kcal 12g protein) if medically feasible with thicken liquid, or consider Add Glucerna Shake 1can bid as medically feasible (440kcal, 20g protein) if thin liquid allowed   [ ] Nutrition Support  [ X ] Other: MVI/minerals, Vit C supplements as medically feasible, Monitor needs for Zn supplement     Monitoring and Evaluation:   [ X ] PO intake [ x ] Tolerance to diet prescription [ x ] weights [ x ] labs[ x ] follow up per protocol  [ ] other:

## 2018-04-18 NOTE — SWALLOW BEDSIDE ASSESSMENT ADULT - SWALLOW EVAL: RECOMMENDED FEEDING/EATING TECHNIQUES
maintain upright posture during/after eating for 30 mins/alternate food with liquid/oral hygiene/allow for swallow between intakes/check mouth frequently for oral residue/pocketing/position upright (90 degrees)/small sips/bites

## 2018-04-18 NOTE — SWALLOW BEDSIDE ASSESSMENT ADULT - ASR SWALLOW ASPIRATION MONITOR
oral hygiene/position upright (90Y)/fever/change of breathing pattern/cough/throat clearing/gurgly voice/pneumonia/upper respiratory infection

## 2018-04-18 NOTE — CHART NOTE - NSCHARTNOTEFT_GEN_A_CORE
Palliative to sign off. Please re-consult as needed. Pt deferred to son, Esteban Duncan ([phone]), for medical decision making. MOLST completed as per wishes: CPR / trial intubation / send to hospital / trial feeding tube / trial IVF / use abx.

## 2018-04-18 NOTE — PROGRESS NOTE ADULT - SUBJECTIVE AND OBJECTIVE BOX
PGY 1 Note discussed with supervising resident and primary attending    Patient is a 89y old  Male who presents with a chief complaint of resp distress (15 Apr 2018 06:00)      INTERVAL HPI/OVERNIGHT EVENTS: offers no new complaints; current symptoms resolving    MEDICATIONS  (STANDING):  aspirin  chewable 81 milliGRAM(s) Oral daily  ATENolol  Tablet 25 milliGRAM(s) Oral daily  atorvastatin 40 milliGRAM(s) Oral at bedtime  calcium acetate 1334 milliGRAM(s) Oral three times a day with meals  cefepime  IVPB 1000 milliGRAM(s) IV Intermittent every 24 hours  cefepime  IVPB      citalopram 20 milliGRAM(s) Oral daily  clopidogrel Tablet 75 milliGRAM(s) Oral daily  collagenase Ointment 1 Application(s) Topical daily  epoetin vivian Injectable 6000 Unit(s) SubCutaneous <User Schedule>  finasteride 5 milliGRAM(s) Oral daily  heparin  Injectable 5000 Unit(s) SubCutaneous every 8 hours  insulin glargine Injectable (LANTUS) 5 Unit(s) SubCutaneous every morning  insulin lispro (HumaLOG) corrective regimen sliding scale   SubCutaneous every 4 hours  iron sucrose IVPB 100 milliGRAM(s) IV Intermittent every 24 hours  isosorbide   mononitrate ER Tablet (IMDUR) 30 milliGRAM(s) Oral daily  pantoprazole  Injectable 40 milliGRAM(s) IV Push daily  tamsulosin 0.4 milliGRAM(s) Oral at bedtime  vancomycin  IVPB 500 milliGRAM(s) IV Intermittent every 24 hours    MEDICATIONS  (PRN):  HYDROmorphone  Injectable 1 milliGRAM(s) IV Push every 4 hours PRN Severe Pain (7 - 10)      __________________________________________________  REVIEW OF SYSTEMS:    CONSTITUTIONAL: No fever,   EYES: no acute visual disturbances  NECK: No pain or stiffness  RESPIRATORY: No cough; No shortness of breath  CARDIOVASCULAR: No chest pain, no palpitations  GASTROINTESTINAL: No pain. No nausea or vomiting; No diarrhea   NEUROLOGICAL: No headache or numbness, no tremors  MUSCULOSKELETAL: No joint pain, no muscle pain  GENITOURINARY: no dysuria, no frequency, no hesitancy  PSYCHIATRY: no depression , no anxiety  ALL OTHER  ROS negative        Vital Signs Last 24 Hrs  T(C): 36.6 (18 Apr 2018 15:00), Max: 37.1 (17 Apr 2018 18:43)  T(F): 97.8 (18 Apr 2018 15:00), Max: 98.8 (17 Apr 2018 18:43)  HR: 67 (18 Apr 2018 13:46) (67 - 85)  BP: 146/76 (18 Apr 2018 13:46) (110/50 - 146/76)  BP(mean): --  RR: 18 (18 Apr 2018 06:02) (17 - 18)  SpO2: 100% (18 Apr 2018 13:46) (96% - 100%)    ________________________________________________  PHYSICAL EXAM:  GENERAL: NAD  HEENT: Normocephalic;  conjunctivae and sclerae clear; moist mucous membranes;   NECK : supple  CHEST/LUNG: Clear to auscultation bilaterally with good air entry   HEART: S1 S2  regular; no murmurs, gallops or rubs  ABDOMEN: Soft, Nontender, Nondistended; Bowel sounds present  EXTREMITIES: no cyanosis; no edema; no calf tenderness  SKIN: warm and dry; no rash  NERVOUS SYSTEM:  Awake and alert; Oriented  to place, person and time ; no new deficits    _________________________________________________  LABS:                        7.6    13.8  )-----------( 299      ( 18 Apr 2018 05:07 )             22.4     04-18    141  |  110<H>  |  83<H>  ----------------------------<  110<H>  3.8   |  18<L>  |  3.31<H>    Ca    7.5<L>      18 Apr 2018 05:07  Phos  4.9     04-17  Mg     1.9     04-17    TPro  5.8<L>  /  Alb  2.1<L>  /  TBili  1.0  /  DBili  x   /  AST  8<L>  /  ALT  7<L>  /  AlkPhos  71  04-18        CAPILLARY BLOOD GLUCOSE      POCT Blood Glucose.: 245 mg/dL (18 Apr 2018 11:32)  POCT Blood Glucose.: 167 mg/dL (18 Apr 2018 07:50)  POCT Blood Glucose.: 170 mg/dL (18 Apr 2018 06:07)  POCT Blood Glucose.: 144 mg/dL (18 Apr 2018 02:04)  POCT Blood Glucose.: 155 mg/dL (17 Apr 2018 22:02)  POCT Blood Glucose.: 171 mg/dL (17 Apr 2018 18:17)        RADIOLOGY & ADDITIONAL TESTS:    Imaging Personally Reviewed:  YES/NO    Consultant(s) Notes Reviewed:   YES/ No    Care Discussed with Consultants :     Plan of care was discussed with patient and /or primary care giver; all questions and concerns were addressed and care was aligned with patient's wishes.

## 2018-04-18 NOTE — PROGRESS NOTE ADULT - SUBJECTIVE AND OBJECTIVE BOX
pt seen and examined.pts current chart reviewed and case discussed with resident covering.    SUBJECTIVE:  Patient was seen and examined, ICU downgrade. Ahumada was removed and patient passed TOV. Still on NG feeds pending speech and swallow. Denies fever, chills, SOB, palpitations, chest pain, or GI complaints. Reports feeling well.    REVIEW OF SYSTEMS:  CONSTITUTIONAL: No weakness, fevers or chills  EYES/ENT: No visual changes;  No vertigo or throat pain   NECK: No pain or stiffness  RESPIRATORY: No cough, wheezing, hemoptysis; No shortness of breath  CARDIOVASCULAR: No chest pain or palpitations  GASTROINTESTINAL: No abdominal or epigastric pain. No nausea, vomiting, or hematemesis; No diarrhea or constipation. No melena or hematochezia.  GENITOURINARY: No dysuria, frequency , hematuria, flank pain or nocturia  NEUROLOGICAL: No numbness or weakness  SKIN: No itching, burning, rashes, or lesions   All other review of systems is negative unless indicated above    Current meds:    aspirin  chewable 81 milliGRAM(s) Oral daily  ATENolol  Tablet 25 milliGRAM(s) Oral daily  atorvastatin 40 milliGRAM(s) Oral at bedtime  cefepime  IVPB 1000 milliGRAM(s) IV Intermittent every 24 hours  cefepime  IVPB      citalopram 20 milliGRAM(s) Oral daily  clopidogrel Tablet 75 milliGRAM(s) Oral daily  collagenase Ointment 1 Application(s) Topical daily  finasteride 5 milliGRAM(s) Oral daily  heparin  Injectable 5000 Unit(s) SubCutaneous every 8 hours  HYDROmorphone  Injectable 1 milliGRAM(s) IV Push every 4 hours PRN  insulin glargine Injectable (LANTUS) 5 Unit(s) SubCutaneous every morning  insulin lispro (HumaLOG) corrective regimen sliding scale   SubCutaneous every 4 hours  isosorbide   mononitrate ER Tablet (IMDUR) 30 milliGRAM(s) Oral daily  pantoprazole  Injectable 40 milliGRAM(s) IV Push daily  tamsulosin 0.4 milliGRAM(s) Oral at bedtime  vancomycin  IVPB 500 milliGRAM(s) IV Intermittent every 24 hours      Vital Signs    T(F): 98.2 (18 @ 06:02), Max: 98.8 (18 @ 16:09)  HR: 67 (18 @ 06:02) (66 - 85)  BP: 115/48 (18 @ 06:02) (95/47 - 152/55)  ABP: --  RR: 18 (18 @ 06:02) (11 - 18)  SpO2: 96% (18 @ 06:02) (96% - 100%)  Wt(kg): --  CVP(cm H2O): --  CO: --  PCWP: --    I and O's:     @ 07:01  -   @ 07:00  --------------------------------------------------------  IN:    Glucerna: 40 mL    IV PiggyBack: 50 mL    propofol Infusion: 10.1 mL    Solution: 100 mL  Total IN: 200.1 mL    OUT:    Indwelling Catheter - Urethral: 4070 mL  Total OUT: 4070 mL    Total NET: -3869.9 mL       @ 07:01  -   @ 07:00  --------------------------------------------------------  IN:    Solution: 100 mL  Total IN: 100 mL    OUT:    Indwelling Catheter - Urethral: 1550 mL  Total OUT: 1550 mL    Total NET: -1450 mL        Daily     Daily Weight in k.8 (2018 06:02)    PHYSICAL EXAM:  Constitutional: well developed, well nourished  and in nad  HEENT: PERRLA,  no icteric sclera and mild pallor of conjunctiva noted  Neck: No JVD, thyromegaly or adenopathy  Respiratory: mild b/l rales at the base  Cardiovascular: S1 and S2 normally heard  Gastrointestinal: soft, nondistended, nontender and normal bowel sounds heard; + NG tube  Extremities: No peripheral edema or cyanosis  Neurological: A/O x 3, no focal deficits  : No flank or cva tenderness palpated.  Skin: No rashes      LABS:    CBC:                          7.6    13.8  )-----------( 299      ( 2018 05:07 )             22.4           BMP:        141  |  110<H>  |  83<H>  ----------------------------<  110<H>  3.8   |  18<L>  |  3.31<H>      132<L>  |  97  |  38<H>  ----------------------------<  108<H>  4.5   |  27  |  3.38<H>      133<L>  |  98  |  29<H>  ----------------------------<  191<H>  4.3   |  26  |  3.09<H>    Ca    7.5<L>      2018 05:07  Ca    7.8<L>      2018 06:35  Ca    7.2<L>      2018 04:50  Phos  4.9       Phos  4.2     -16  Mg     1.9       Mg     1.9     -16    TPro  5.8<L>  /  Alb  2.1<L>  /  TBili  1.0  /  DBili  x   /  AST  8<L>  /  ALT  7<L>  /  AlkPhos  71    TPro  5.1<L>  /  Alb  1.9<L>  /  TBili  0.5  /  DBili  x   /  AST  18  /  ALT  10  /  AlkPhos  99      URINE STUDIES:    Osmolality, Random Urine: 281 mos/kg ( @ 13:54)  Sodium, Random Urine: 115 mmol/L ( @ 13:54)  Creatinine, Random Urine: <13 mg/dL ( @ 13:54)    RADIOLOGY & ADDITIONAL STUDIES:    < from: Xray Chest 1 View- PORTABLE-Routine (18 @ 10:02) >  FINDINGS/  IMPRESSION:  Sternotomy wires surgical clips, feeding tube again noted. ET tube no   longer seen. No pneumothorax.    Increased interstitial lung markings without significant change.   Bilateral pleural effusions and adjacent opacities without significant   change in the right given differences in technique. Slightly progressed   left midlung zone.    Heart size cannot be accurately assessed in this projection, but appear   enlarged.    < end of copied text >    < from: US Renal (18 @ 13:03) >  FINDINGS:  The right kidney measures 10.4 x 4.4 x 4.6 cm.    The left kidney measures 9.5 x 5.3 x 3.8 cm.    There is no hydronephrosis. Small bilateral renal cysts are noted   measuring up to 1.4 cm on the left kidney    Echogenic kidneys noted which may represent medical renal disease    IMPRESSION:  No hydronephrosis.     < end of copied text >

## 2018-04-18 NOTE — PROGRESS NOTE ADULT - SUBJECTIVE AND OBJECTIVE BOX
Patient is a 89y old  Male who presents with a chief complaint of resp distress (15 Apr 2018 06:00)      INTERVAL HPI/OVERNIGHT EVENTS:  looks well, ready for home    VITAL SIGNS:  T(F): 98.2 (04-18-18 @ 06:02)  HR: 67 (04-18-18 @ 06:02)  BP: 115/48 (04-18-18 @ 06:02)  RR: 18 (04-18-18 @ 06:02)  SpO2: 96% (04-18-18 @ 06:02)  Wt(kg): --  I&O's Detail    17 Apr 2018 07:01  -  18 Apr 2018 07:00  --------------------------------------------------------  IN:    Solution: 100 mL  Total IN: 100 mL    OUT:    Indwelling Catheter - Urethral: 1550 mL  Total OUT: 1550 mL    Total NET: -1450 mL              REVIEW OF SYSTEMS:    CONSTITUTIONAL:  No fevers, chills, sweats    HEENT:  Eyes:  No diplopia or blurred vision. ENT:  No earache, sore throat or runny nose.    CARDIOVASCULAR:  No pressure, squeezing, tightness, or heaviness about the chest; no palpitations.    RESPIRATORY:  no sob    GASTROINTESTINAL:  No abdominal pain, nausea, vomiting or diarrhea.    GENITOURINARY:  No dysuria, frequency or urgency.    NEUROLOGIC:  No paresthesias, fasciculations, seizures or weakness.    PSYCHIATRIC:  No disorder of thought or mood.      PHYSICAL EXAM:    Constitutional:no complaints  ENMT:atnc, ngt in place  Neck:supple  Respiratory:clear  Cardiovascular:rr  Gastrointestinal:soft  Extremities:no edema, rt hip wound clean  Vascular:intact  Neurological:non focal  Musculoskeletal:no edema, normal rom    MEDICATIONS  (STANDING):  aspirin  chewable 81 milliGRAM(s) Oral daily  ATENolol  Tablet 25 milliGRAM(s) Oral daily  atorvastatin 40 milliGRAM(s) Oral at bedtime  cefepime  IVPB 1000 milliGRAM(s) IV Intermittent every 24 hours  cefepime  IVPB      citalopram 20 milliGRAM(s) Oral daily  clopidogrel Tablet 75 milliGRAM(s) Oral daily  collagenase Ointment 1 Application(s) Topical daily  finasteride 5 milliGRAM(s) Oral daily  heparin  Injectable 5000 Unit(s) SubCutaneous every 8 hours  insulin glargine Injectable (LANTUS) 5 Unit(s) SubCutaneous every morning  insulin lispro (HumaLOG) corrective regimen sliding scale   SubCutaneous every 4 hours  isosorbide   mononitrate ER Tablet (IMDUR) 30 milliGRAM(s) Oral daily  pantoprazole  Injectable 40 milliGRAM(s) IV Push daily  tamsulosin 0.4 milliGRAM(s) Oral at bedtime  vancomycin  IVPB 500 milliGRAM(s) IV Intermittent every 24 hours    MEDICATIONS  (PRN):  HYDROmorphone  Injectable 1 milliGRAM(s) IV Push every 4 hours PRN Severe Pain (7 - 10)      Allergies    No Known Aller        LABS:                        7.6    13.8  )-----------( 299      ( 18 Apr 2018 05:07 )             22.4     04-18    141  |  110<H>  |  83<H>  ----------------------------<  110<H>  3.8   |  18<L>  |  3.31<H>    Ca    7.5<L>      18 Apr 2018 05:07  Phos  4.9     04-17  Mg     1.9     04-17    TPro  5.8<L>  /  Alb  2.1<L>  /  TBili  1.0  /  DBili  x   /  AST  8<L>  /  ALT  7<L>  /  AlkPhos  71  04-18        ABG - ( 16 Apr 2018 11:15 )  pH: 7.44  /  pCO2: 38    /  pO2: 102   / HCO3: 26    / Base Excess: 1.8   /  SaO2: 98                    CAPILLARY BLOOD GLUCOSE      POCT Blood Glucose.: 167 mg/dL (18 Apr 2018 07:50)  POCT Blood Glucose.: 170 mg/dL (18 Apr 2018 06:07)  POCT Blood Glucose.: 144 mg/dL (18 Apr 2018 02:04)  POCT Blood Glucose.: 155 mg/dL (17 Apr 2018 22:02)  POCT Blood Glucose.: 171 mg/dL (17 Apr 2018 18:17)  POCT Blood Glucose.: 242 mg/dL (17 Apr 2018 14:19)  POCT Blood Glucose.: 208 mg/dL (17 Apr 2018 10:02)    pro-bnp [id number] 04-16 @ 04:50     d-dimer --  04-16 @ 04:50      RADIOLOGY & ADDITIONAL TESTS:  EXAM:  US KIDNEY(S)                            PROCEDURE DATE:  04/17/2018          INTERPRETATION:  CLINICAL STATEMENT: A KI    TECHNIQUE: Ultrasound of the kidneys.    COMPARISON: None.    FINDINGS:  The right kidney measures 10.4 x 4.4 x 4.6 cm.    The left kidney measures 9.5 x 5.3 x 3.8 cm.    There is no hydronephrosis. Small bilateral renal cysts are noted   measuring up to 1.4 cm on the left kidney    Echogenic kidneys noted which may represent medical renal disease    IMPRESSION:  No hydronephrosis.     EXAM:  XR CHEST PORTABLE ROUTINE 1V                            PROCEDURE DATE:  04/17/2018          INTERPRETATION:  CLINICAL STATEMENT: Follow-up chest pain.    TECHNIQUE: AP view of the chest.    COMPARISON: 4/16/2018    FINDINGS/  IMPRESSION:  Sternotomy wires surgical clips, feeding tube again noted. ET tube no   longer seen. No pneumothorax.    Increased interstitial lung markings without significant change.   Bilateral pleural effusions and adjacent opacities without significant   change in the right given differences in technique. Slightly progressed   left midlung zone.    Heart size cannot be accurately assessed in this projection, but appear   enlarged.

## 2018-04-18 NOTE — SWALLOW BEDSIDE ASSESSMENT ADULT - SWALLOW EVAL: DIAGNOSIS
Pt p/w adequate labial seal, functional rotary chew, unremarkable oral phase; timely and efficient oropharyngeal swallow. Pt's swallow sequence appears WFL to tolerate a regular texture diet with no overt s/s of laryngeal aspiration.

## 2018-04-18 NOTE — SWALLOW BEDSIDE ASSESSMENT ADULT - COMMENTS
Pt HOB elevated to 90 degrees, AA+Ox3, able to follow directives and respond to queries regarding name, current location, and time. Pt.'s granddaughter was present & available for Samoan to English translation Pt HOB elevated to 90 degrees, AA+Ox3, able to follow directives and respond to queries regarding name, current location, and time. Pt.'s granddaughter was present & available for Iranian to English translation.

## 2018-04-18 NOTE — PROGRESS NOTE ADULT - ASSESSMENT
89yo male from home, lives with wife, no home attendant, PMH CAD s/p CABG (12-13 years ago), s/p left hip pinning for left hip fracture (8 years ago), DM, HTN, BPH with urinary retention p/w worsening shortness of breath and altered mental status.  Intubated for hypercapneic respiratory failure 2/2 hypoventilation 2/2 likely aspiration pna vs pulmonary edema. extubated on 4/16. transferred to floor for further management.

## 2018-04-18 NOTE — CONSULT NOTE ADULT - SUBJECTIVE AND OBJECTIVE BOX
CHIEF COMPLAINT:Patient is a 89y old  Male who presents with a chief complaint of resp distress.      HPI:  89 yr old male from home, lives with wife, no home attendant, PMH CAD s/p CABG (12-13 years ago), s/p left hip pinning for left hip fracture (8 years ago), DM, HTN, BPH with urinary retention p/w worsening shortness of breath and altered mental status. History and ROS obtained from son (Esteban [phone], at bedside).  Patient was recently admitted to [facility] from 3/29 to 4/6/2018 for right hip fracture after mechanical fall.  Patient underwent right hip arthroplasty on 3/30 with Dr Sosa, complicated by ileus and E coli UTI, and discharged on 4/6 to home.  Patient has been receiving home PT.  2 days ago, while walking with therapist, patient endorsed shortness of breath and wheezing, resolved after rest.  The next day, son called patient's wife and was told he was doing well.  This early morning at 1am, grandson noticed that patient in distress with worsening sob and was disoriented.  911 called.  Son states that patient did not endorse fever, headache, change in vision or hearing, sore throat, chest pain, vomiting, diarrhea, hematochezia or hematuria.  Last colonoscopy 7 years ago. Endorses occasional nausea and 'gagging' with food.     3/29 TTE: grossly nl LV function    In ED, T 98.6 P 81 /80 RR 16    Patient was in CT scanner for head CT when patient became more short of breath and VBG returned [id number] 71  patient was intubated     CXR: ? RLL infiltrate vs effusion, prominent bilateral hilum  EKG: SR @ 81bpm, right axis deviation, RBBB, no change from previous EKG  T1 0.121, BNP (15 Apr 2018 05:37)      PAST MEDICAL & SURGICAL HISTORY:  HLD (hyperlipidemia)  Hip fracture, left  CAD (coronary artery disease): s/p by pass surgery  Diabetes  Hypertension  S/P CABG (coronary artery bypass graft)  History of hip surgery  CRI    MEDICATIONS  (STANDING):  aspirin  chewable 81 milliGRAM(s) Oral daily  ATENolol  Tablet 25 milliGRAM(s) Oral daily  atorvastatin 40 milliGRAM(s) Oral at bedtime  calcium acetate 1334 milliGRAM(s) Oral three times a day with meals  cefepime  IVPB 1000 milliGRAM(s) IV Intermittent every 24 hours  cefepime  IVPB      citalopram 20 milliGRAM(s) Oral daily  clopidogrel Tablet 75 milliGRAM(s) Oral daily  collagenase Ointment 1 Application(s) Topical daily  finasteride 5 milliGRAM(s) Oral daily  heparin  Injectable 5000 Unit(s) SubCutaneous every 8 hours  insulin glargine Injectable (LANTUS) 5 Unit(s) SubCutaneous every morning  insulin lispro (HumaLOG) corrective regimen sliding scale   SubCutaneous every 4 hours  isosorbide   mononitrate ER Tablet (IMDUR) 30 milliGRAM(s) Oral daily  pantoprazole  Injectable 40 milliGRAM(s) IV Push daily  tamsulosin 0.4 milliGRAM(s) Oral at bedtime  vancomycin  IVPB 500 milliGRAM(s) IV Intermittent every 24 hours    MEDICATIONS  (PRN):  HYDROmorphone  Injectable 1 milliGRAM(s) IV Push every 4 hours PRN Severe Pain (7 - 10)      FAMILY HISTORY:  No pertinent family history in first degree relatives      SOCIAL HISTORY:    [ x] Non-smoker    [x ] Alcohol-denies    Allergies    No Known Allergies    Intolerances    	    REVIEW OF SYSTEMS:  CONSTITUTIONAL: No fever, weight loss, or fatigue  EYES: No eye pain, visual disturbances, or discharge  ENT:  No difficulty hearing, tinnitus, vertigo; No sinus or throat pain  NECK: No pain or stiffness  RESPIRATORY: No cough, wheezing, chills or hemoptysis; + Shortness of Breath  CARDIOVASCULAR: No chest pain, palpitations, passing out, dizziness, or leg swelling  GASTROINTESTINAL: No abdominal or epigastric pain. No nausea, vomiting, or hematemesis; No diarrhea or constipation. No melena or hematochezia.  GENITOURINARY: No dysuria, frequency, hematuria, or incontinence  NEUROLOGICAL: No headaches, memory loss, loss of strength, numbness, or tremors  SKIN: No itching, burning, rashes, or lesions   LYMPH Nodes: No enlarged glands  ENDOCRINE: No heat or cold intolerance; No hair loss  MUSCULOSKELETAL: No joint pain or swelling; No muscle, back, or extremity pain  PSYCHIATRIC: No depression, anxiety, mood swings, or difficulty sleeping  HEME/LYMPH: No easy bruising, or bleeding gums  ALLERGY AND IMMUNOLOGIC: No hives or eczema	      PHYSICAL EXAM:  T(C): 36.8 (04-18-18 @ 06:02), Max: 37.1 (04-17-18 @ 16:09)  HR: 67 (04-18-18 @ 06:02) (66 - 85)  BP: 115/48 (04-18-18 @ 06:02) (95/47 - 152/55)  RR: 18 (04-18-18 @ 06:02) (11 - 18)  SpO2: 96% (04-18-18 @ 06:02) (96% - 100%)  Wt(kg): --  I&O's Summary    17 Apr 2018 07:01  -  18 Apr 2018 07:00  --------------------------------------------------------  IN: 100 mL / OUT: 1550 mL / NET: -1450 mL        Appearance: Normal	  HEENT:   Normal oral mucosa, PERRL, EOMI	  Lymphatic: No lymphadenopathy  Cardiovascular: Normal S1 S2, No JVD, No murmurs, No edema  Respiratory: Lungs clear to auscultation	  Psychiatry: A & O x 3, Mood & affect appropriate  Gastrointestinal:  Soft, Non-tender, + BS	  Skin: No rashes, No ecchymoses, No cyanosis	  Neurologic: Non-focal  Extremities: Normal range of motion, No clubbing, cyanosis or edema  Vascular: Peripheral pulses palpable 2+ bilaterally    ECG:  	Sinus rhythm with 1st degree A-V block  Possible Left atrial enlargement  Right bundle branch block      LABS:	 	                      7.6    13.8  )-----------( 299      ( 18 Apr 2018 05:07 )             22.4     04-18    141  |  110<H>  |  83<H>  ----------------------------<  110<H>  3.8   |  18<L>  |  3.31<H>    Ca    7.5<L>      18 Apr 2018 05:07  Phos  4.9     04-17  Mg     1.9     04-17    TPro  5.8<L>  /  Alb  2.1<L>  /  TBili  1.0  /  DBili  x   /  AST  8<L>  /  ALT  7<L>  /  AlkPhos  71  04-18    OBSERVATIONS:  Mitral Valve: Normal mitral valve. Trace mitral  regurgitation.  Aortic Root: Normal aortic root.  Aortic Valve: Aortic valve leaflet morphology not well  visualized, opens normally.  Left Atrium: Mild left atrial enlargement.  Left Ventricle: Endocardium not well visualized;grossly  normal left ventricular systolic function. Normal left  ventricular internal dimensions and wall thicknesses.  Right Heart: Normal right atrium. Normal right ventricular  size and function. There is trace tricuspid regurgitation.  There is trace pulmonic regurgitation.  Pericardium/PleuraNormal pericardium with no pericardial  effusion.  Hemodynamic: RA Pressure is 8 mm Hg. RV systolic pressure  is 33 mm Hg.    EXAM:  US KIDNEY(S)                            PROCEDURE DATE:  04/17/2018          INTERPRETATION:  CLINICAL STATEMENT: A KI    TECHNIQUE: Ultrasound of the kidneys.    COMPARISON: None.    FINDINGS:  The right kidney measures 10.4 x 4.4 x 4.6 cm.    The left kidney measures 9.5 x 5.3 x 3.8 cm.    There is no hydronephrosis. Small bilateral renal cysts are noted   measuring up to 1.4 cm on the left kidney    Echogenic kidneys noted which may represent medical renal disease    IMPRESSION:  No hydronephrosis.     EXAM:  XR CHEST PORTABLE ROUTINE 1V                            PROCEDURE DATE:  04/17/2018          INTERPRETATION:  CLINICAL STATEMENT: Follow-up chest pain.    TECHNIQUE: AP view of the chest.    COMPARISON: 4/16/2018    FINDINGS/  IMPRESSION:  Sternotomy wires surgical clips, feeding tube again noted. ET tube no   longer seen. No pneumothorax.    Increased interstitial lung markings without significant change.   Bilateral pleural effusions and adjacent opacities without significant   change in the right given differences in technique. Slightly progressed   left midlung zone.    Heart size cannot be accurately assessed in this projection, but appear   enlarged.

## 2018-04-18 NOTE — CONSULT NOTE ADULT - CONSULT REQUESTED DATE/TIME
15-Apr-2018 11:00
15-Apr-2018 21:19
17-Apr-2018 16:51
17-Apr-2018 18:59
18-Apr-2018 10:56
16-Apr-2018 12:09
16-Apr-2018 11:18

## 2018-04-18 NOTE — PROGRESS NOTE ADULT - ASSESSMENT
-S/P RESPIRATORY FAILURE 2/2 PN(HCAP)/CHF-IMPROVED-NET DIURESIS  -WOUND INFECTION  -S/P POSTERIOR DISLOCATION OF HIP-REDUCED  -ACUTE ON CKD  -HX; CAD/CABG,DM,HTN,HLD,BPH,S/P ORIF RT HIP 3/30/18    PLAN; ANTIBX-MAXIPIME/VANCO/02           DVT PPX           ID/ORTHO/RENAL F/U           PROCALCITONIN           REMOVE NGT-SWALLOW EVAL           CARDIO EVAL

## 2018-04-18 NOTE — PROGRESS NOTE ADULT - SUBJECTIVE AND OBJECTIVE BOX
ORTHO F/U FOR RIGHT FEMORAL NECK FX  S/p right hip hemiarthroplasty 3/30 with reduction of dislocated prosthesis 4/15  Reports no right hip pain   Started PT ambulation WBAT with walker and assistance.    PE: Awake and alert  Afeb on antibiotics for pneumonia       Abduction pillow in place.       No hip or groin pain on PROM right hip       Right hip incision clean with no erythema and dry and healing well with no discharge on dressing.       No calf tenderness    X-ray: CT Scan of right hip shows the prosthesis well reduced in acetabulum            Fluid present right hip joint and adjacent tissues c/w hematoma    A/P: Right femoral neck fracture s/p hemiarthroplasty         Dislocation of prothesis s/p reduction         Right hip incision was clean and dry with no discharge on dressing when I changed it on 3/15 and remains so today.         Clinically no evidence of infection right hip          Continue abduction pillow         Continue mobilization and PT ambulation with strict hip precautions.         Will remove staples prior to discharge

## 2018-04-18 NOTE — CONSULT NOTE ADULT - CONSULT REASON
CAD,SOB
F/U right hip hemiarthroplasty for femoral neck fracture.
Pneumonia, respiratory failure and right hip infection.
diabetes
goals of care
internal medicine
LOBITO on CKD

## 2018-04-18 NOTE — SWALLOW BEDSIDE ASSESSMENT ADULT - SLP PERTINENT HISTORY OF CURRENT PROBLEM
89yo male from home, lives with wife, no home attendant, PMH CAD s/p CABG (12-13 years ago), s/p left hip pinning for left hip fracture (8 years ago), DM, HTN, BPH with urinary retention p/w worsening shortness of breath and altered mental status. Pt admitted to ICU and intubated for hypercapneic respiratory failure 2/2 hypoventilation 2/2 likely aspiration pna. 89yo male from home, lives with wife, no home attendant, PMH CAD s/p CABG (12-13 years ago), s/p left hip pinning for left hip fracture (8 years ago), DM, HTN, BPH with urinary retention p/w worsening shortness of breath and altered mental status. Pt admitted to ICU and intubated for hypercapneic respiratory failure on 4/15 and extubated on 4/16

## 2018-04-18 NOTE — CONSULT NOTE ADULT - ASSESSMENT
89 yr old male from home, lives with wife, no home attendant, PMH CAD s/p CABG (12-13 years ago), s/p left hip pinning for left hip fracture (8 years ago), DM, HTN, BPH with urinary retention p/w worsening shortness of breath and altered mental status due to pneumonia.  1.ABX.  2.CAD-cont asa,b blocker,statin.  3.DM-Insulin.  4.CRI-renal f/u.  5.Speech and swallow eval.  6.BPH-Flomax.  7.GI and DVT prophylaxis.

## 2018-04-18 NOTE — PROGRESS NOTE ADULT - ATTENDING COMMENTS
pt seen and examined with renal resident  All vitals,meds and labs reviewed  pts renal function is stable off diuretics  Rest as per medical team . and as per above renal resident note

## 2018-04-18 NOTE — PROGRESS NOTE ADULT - SUBJECTIVE AND OBJECTIVE BOX
Meds:  cefepime  IVPB 1000 milliGRAM(s) IV Intermittent every 24 hours  Vancomycin 500 mg IVPB q day.    Allergies:  Allergies    No Known Allergies    Intolerances        ROS  [  ] UNABLE TO ELICIT    General:  [  ] None  [  ] Fever  [  ] Chills  [ x ] Malaise    Skin:  [ x ] None [  ] Rash  [  ] Wound  [  ] Ulcer    HEENT:  [ x ] None  [  ] Sore Throat  [  ] Nasal congestion/ runny nose  [  ] Photophobia [  ] Neck pain      Chest:  [  ] None   [  ] SOB  [ x ] Cough  [  ] None    Cardiovascular:   [ x ] None  [  ] CP  [  ] Palpitation    Gastrointestinal:  [ x ] None  [  ] Abd pain   [  ] Nausea    [  ] Vomiting   [  ] Diarrhea	     Genitourinary:  [ x ] None [  ] Polyuria   [  ] Urgency  [  ] Frequency  [  ] Dysuria    [  ]  Hematuria       Musculoskeletal:  [  ] None [  ] Back Pain	[  ] Body aches  [  ] Joint pain [ x ] Weakness    Neurological: [  ] None [  ]Dizziness  [  ]Visual Disturbance  [  ]Headaches   [ x ] Weakness          PHYSICAL EXAM:  Vital Signs Last 24 Hrs  T(C): 36.6 (18 Apr 2018 15:00), Max: 37.1 (17 Apr 2018 18:43)  T(F): 97.8 (18 Apr 2018 15:00), Max: 98.8 (17 Apr 2018 18:43)  HR: 67 (18 Apr 2018 13:46) (67 - 85)  BP: 146/76 (18 Apr 2018 13:46) (110/50 - 146/76)  BP(mean): --  RR: 18 (18 Apr 2018 06:02) (17 - 18)  SpO2: 100% (18 Apr 2018 13:46) (96% - 100%)    Constitutional:    HEENT: [ x ] Wnl  [  ] Pharyngeal congestion [ x ] Extubated.    Neck:  [ x ] Supple  [  ]Lymphadenopathy  [ x ] No JVD   [  ] JVD  [  ] Masses   [  ] WNL    CHEST/Respiratory:  [  ]Clear to auscultation  [ x ] Rales   [  ] Rhonchi   [  ] Wheezing     [  ] Chest Tenderness      Cardiovascular:  [ x ] Reg S1 S2   [  ] Irreg S1 S2   [ x ]No Murmur  [  ] +ve Murmurs  [  ]Systolic [  ]Diastolic      Abdomen:  [ x ] Soft  [ x ] No tendrerness  [  ] Tenderness  [  ] Organomegaly  [  ] ABD Distention  [  ] Rigidity                       [ x ] No Regidity                       [ x ] No Rebound Tenderness  [  ] No Guarding Rigidity  [  ] Rebound Tenderness[  ] Guarding Rigidity                          [ x ]  +ve Bowel Sounds  [  ] Decreased Bowel Sounds    [  ] Absent Bowel Sounds                            Extremities: [  ] No edema [ x ] Edema  [  ] Clubbing   [  ] Cyanosis                         [ x ] No Tender Calf muscles  [  ] Tender Calf muscles                        [ x ] Palpable peripheral pulses    Neurological: [  ] Awake  [  ] Alert  [  ] Oriented  x                             [  ] Confused  [  ] Drowsy  [ x ] respond to painful stimuli  [  ] Unresponsive    Skin:  [  ] Intact [  ] Redness [  ] Thrombophlebitis  [  ] Rashes  [  ] Dry  [ x ] Right hip surgical dry wound, and staples in place    Ortho:  [  ] Joint Swelling  [  ] Joint erythema [  ] Joint tenderness                [  ] Increased temp. to touch  [  ] DJD [ x ] WNL          LABS/DIAGNOSTIC TESTS                        7.6    13.8  )-----------( 299      ( 18 Apr 2018 05:07 )             22.4   04-18    141  |  110<H>  |  83<H>  ----------------------------<  110<H>  3.8   |  18<L>  |  3.31<H>    Ca    7.5<L>      18 Apr 2018 05:07  Phos  4.9     04-17  Mg     1.9     04-17    TPro  5.8<L>  /  Alb  2.1<L>  /  TBili  1.0  /  DBili  x   /  AST  8<L>  /  ALT  7<L>  /  AlkPhos  71  04-18         CULTURES:     Culture - Blood (04.15.18 @ 12:42)    Specimen Source: .Blood Blood    Culture Results:   No growth to date.    Culture - Blood (04.15.18 @ 12:41)    Specimen Source: .Blood Blood    Culture Results:   No growth to date.    Culture - Urine (04.15.18 @ 12:09)    Specimen Source: .Urine Clean Catch (Midstream)    Culture Results:   No growth          RADIOLOGY:    EXAM:  CT HIP ONLY RT                            PROCEDURE DATE:  04/18/2018          INTERPRETATION:  CT HIP ONLY RT dated 4/18/2018 1:59 PM     INDICATION: Right hip pain. Postoperative evaluation.    COMPARISON: Pelvic radiograph dated 4/15/2018and dating back to 3/29/2018    TECHNIQUE: CT imaging of the right hip was performed. The data was   reformatted in the axial, coronal, and sagittal planes. Additionally, 3-D   reformatted imaging was created at a separate workstation.    FINDINGS:    OSSEOUS STRUCTURES: The patient is status post right hip   hemiarthroplasty. The hip is located within the acetabulum. No   periprosthetic fracture is identified. There is no subsidence. The pubic   symphysis is preserved. Mild spurring at the rightsacroiliac joint.  SYNOVIUM/ JOINT FLUID: There is a large fluid surrounding the right hip   arthroplasty decompressing posteriorly and tracking along the greater   trochanter inferiorly along the lateral thigh. Some of this fluid   demonstrates increased density suggestive of hemorrhagic components.  TENDONS: The proximal hamstring tendon is intact. Evaluation of the   iliopsoas is limited by beam hardening artifact but is intact.  MUSCLES: There is enlargement and hyperdensity within the gluteus medius   muscle suggesting intramuscular hematoma. There is attenuation of the   performance muscle. There is fluid tracking along the subcutaneous   fascial plane of the vastus lateralis muscle.  NEUROVASCULAR STRUCTURES: Moderate vascular calcifications are noted.  SUBCUTANEOUS SOFT TISSUES: There is mild to moderate right hip soft   tissue swelling with skin thickening. Surgical clips are seen overlying   the right hip in keeping with recent surgery.  INTRAPELVIC SOFT TISSUES: Small fat-containing inguinal hernia.    3-D reformatted imaging confirms these findings.    IMPRESSION:    1.  Status post right hip hemiarthroplasty with large complex fluid   collection about the right hip arthroplasty and decompressing on the   right lateral thigh. Findings are thought to represent an evolving   hematoma. Superimposed infection is not excluded.  2.  Intramuscular hematoma within the right gluteus medius muscle.   3.  Subfascial fluid tracking along the vastus lateralis, likely evolving   hematoma. Superimposed infection is not excluded.          CXR:    EXAM:  XR CHEST AP OR PA 1V                            PROCEDURE DATE:  04/15/2018          INTERPRETATION:  PORTABLE CHEST X-RAY    HISTORY: s/p intubation, confirm placement.    COMPARISON: 4/15/2018.    FINDINGS/  IMPRESSION:  Interval intubation with ET tube tip approximately 3.3 cm above the   riaz. No pneumothorax.    Pulmonary vascular congestion, worsening bilateral perihilar airspace   opacities. Small bilateral pleural effusions are unchanged.    The cardiac silhouette is not accurately assessed by AP technique. Aortic   calcifications. Sternotomy wires.        EXAM:  CT BRAIN                            PROCEDURE DATE:  04/15/2018          INTERPRETATION:  CLINICAL INFORMATION: Altered mental status.    COMPARISON: None available.    PROCEDURE:   Noncontrast CT of the Head was performed from the skull base to the   vertex. Coronal and Sagittal reformats were obtained.    FINDINGS:    There is no acute intracranial hemorrhage, mass effect, midline shift,   extra-axial collection, hydrocephalus, or evidence of acute vascular   territorial infarction. Mild patchy hypodensities within the   periventricular and subcortical white matter, although nonspecific,   likely reflect chronic microvascular disease. Cerebral volume loss   results in prominence of the ventricles and sulci.    The visualized paranasal sinuses and mastoid air cells are clear.   Visualized osseous structures are intact.    IMPRESSION:     No acute intracranial hemorrhage, mass effect, or evidence of acute   vascular territorial infarction.    If clinical symptoms persist or worsen, short-term repeat CT head or more   sensitive evaluation with brain MRI may be obtained, if no   contraindications exist.          Assessment and Recommendation:   89yo male from home, lives with wife, no home attendant, PMH CAD s/p CABG (12-13 years ago), s/p left hip pinning for left hip fracture (8 years ago), DM, HTN, BPH with urinary retention p/w worsening shortness of breath and altered mental status. History and ROS obtained from son (Esteban [phone], at bedside).  Patient was recently admitted to [facility] from 3/29 to 4/6/2018 for right hip fracture after mechanical fall.   Patient was intubated and admitted to ICU and was given Vancomycin and Cefepime.  4/16/18 Patient was extubated and tolerated it well.  Patient was transferred to a regular floor on 4/17/18.  4/18/18 WBC is increased but patient feels better.  CT of the hip suggested for collection(hematoma and/or infection).    Problem/Recommendation - 1:  Problem: Pneumonia, bacterial.   Recommendation:   1- UA & CS.  2- Follow Blood culture to final report.  3- Continue Vancomycin 500 mg IVPB q day.  4- Continue IV Cefepime 1 gm IVPB q day.  5- Fluid and electrolytes management.  6- CBC and BMP follow up.   7- Follow Vancomycin trough closely and adjust Vancomycin dose accordingly.    Problem/Recommendation - 2:  ·  Problem: Postoperative wound infection, sequela.    Recommendation:   1- Orthopedic follow up.  2- ESR and CRP.  3- Wound care right hip.     Problem/Recommendation - 3:  ·  Problem: Type 2 diabetes mellitus with stage 3 chronic kidney disease, unspecified long term insulin use status.    Recommendation:   1- Blood sugar monitoring and control.  2- Accu-Cheks with coverage.  3- 1800 karolyn ADA diet.  4- Follow HB A1C.     Problem/Recommendation - 4:  ·  Problem: Essential hypertension.    Recommendation:   1- Monitor Blood pressure closely.  2- Blood pressure control.  3- BP. meds as per cardiology and primary care team.     Discussed with medical resident.

## 2018-04-18 NOTE — PROGRESS NOTE ADULT - SUBJECTIVE AND OBJECTIVE BOX
HPI:  89yo male from home, lives with wife, no home attendant, PMH CAD s/p CABG (12-13 years ago), s/p left hip pinning for left hip fracture (8 years ago), DM, HTN, BPH with urinary retention p/w worsening shortness of breath and altered mental status. History and ROS obtained from son (Esteban [phone], at bedside).  Patient was recently admitted to [facility] from 3/29 to 4/6/2018 for right hip fracture after mechanical fall.  Patient underwent right hip arthroplasty on 3/30 with Dr Sosa, complicated by ileus and E coli UTI, and discharged on 4/6 to home.  Patient has been receiving home PT.  2 days ago, while walking with therapist, patient endorsed shortness of breath and wheezing, resolved after rest.  The next day, son called patient's wife and was told he was doing well.  This early morning at 1am, grandson noticed that patient in distress with worsening sob and was disoriented.  911 called.  Son states that patient did not endorse fever, headache, change in vision or hearing, sore throat, chest pain, vomiting, diarrhea, hematochezia or hematuria.  Last colonoscopy 7 years ago. Endorses occasional nausea and 'gagging' with food.     3/29 TTE: grossly nl LV function    In ED, T 98.6 P 81 /80 RR 16    Patient was in CT scanner for head CT when patient became more short of breath and VBG returned [id number]  patient was intubated     CXR: ? RLL infiltrate vs effusion, prominent bilateral hilum  EKG: SR @ 81bpm, right axis deviation, RBBB, no change from previous EKG  T1 0.121, BNP (15 Apr 2018 05:37)      Meds:  cefepime  IVPB 1000 milliGRAM(s) IV Intermittent every 24 hours  cefepime  IVPB      vancomycin  IVPB 500 milliGRAM(s) IV Intermittent every 24 hours    Allergies:  Allergies    No Known Allergies    Intolerances        REVIEW OF SYSTEMS:    CONSTITUTIONAL: No weakness, fevers or chills  EYES/ENT: No visual changes;  No vertigo or throat pain   NECK: No pain or stiffness  RESPIRATORY: No cough, wheezing, hemoptysis; No shortness of breath  CARDIOVASCULAR: No chest pain or palpitations  GASTROINTESTINAL: No abdominal or epigastric pain. No nausea, vomiting, or hematemesis; No diarrhea or constipation. No melena or hematochezia.  GENITOURINARY: No dysuria, frequency or hematuria  NEUROLOGICAL: No numbness or weakness  SKIN: No itching, burning, rashes, or lesions   All other review of systems is negative unless indicated above.    PHYSICAL EXAM:    Vital Signs Last 24 Hrs  T(C): 36.6 (18 Apr 2018 15:00), Max: 37.1 (17 Apr 2018 18:43)  T(F): 97.8 (18 Apr 2018 15:00), Max: 98.8 (17 Apr 2018 18:43)  HR: 67 (18 Apr 2018 13:46) (67 - 85)  BP: 146/76 (18 Apr 2018 13:46) (110/50 - 146/76)  BP(mean): --  RR: 18 (18 Apr 2018 06:02) (17 - 18)  SpO2: 100% (18 Apr 2018 13:46) (96% - 100%)    HEENT:awake  lungs ae reduced  cardia irreg  abd soft    Neck:  [  ] Supple  [  ] Lymphadenopathy  [  ] JVD  [  ] Masses  [  ] WNL    CHEST/Respiratory: [ ] Clear to auscultation     [  ] Rales      [  ] Rhonchi      [  ] Wheezing       [  ] Chest Tenderness     [  ] WNL    Cardiovascular:  [  ]S1 S2  [  ] Reg  [  ] Irreg   [  ] No Murmurs   [  ] Murmurs  [  ] Systolic [  ] Diastolic    Gastrointestinal:  [  ] Bowel Sounds  [   ] ABD Soft  [  ] ABD Distention   [  ] No Tenderness [  ] Tenderness  [  ] Organomegaly  [  ] Guarding rigidity  [  ] No Guarding rigidity  [  ] Rebound Tenderness [  ] No Rebound Tenderness    Extremities: [  ] Edema  [  ] No Edema  [  ] Clubbing   [  ] Cyanosis  [  ] Palpable peripheral pulses                          [  ] Tender calf muscles    [  ] No Tender Calf Muscles    Neurological:  [  ] Alert  [  ] Awake  [  ] Oriented  x                              [  ] Focal weakness  [  ] No Focal Weakness    Skin:  [  ] Thrombophlebitis  [  ] Rashes  [  ] Dry  [  ] Ulcers    Ortho:  [  ] Joint Swelling  [  ] Joint erythema [  ] DJD [  ] Increased Temperature to Touch      LABS/DIAGNOSTIC TESTS                          7.6    13.8  )-----------( 299      ( 18 Apr 2018 05:07 )             22.4         04-18    141  |  110<H>  |  83<H>  ----------------------------<  110<H>  3.8   |  18<L>  |  3.31<H>    Ca    7.5<L>      18 Apr 2018 05:07  Phos  4.9     04-17  Mg     1.9     04-17    TPro  5.8<L>  /  Alb  2.1<L>  /  TBili  1.0  /  DBili  x   /  AST  8<L>  /  ALT  7<L>  /  AlkPhos  71  04-18      CAPILLARY BLOOD GLUCOSE      POCT Blood Glucose.: 202 mg/dL (18 Apr 2018 17:01)  POCT Blood Glucose.: 245 mg/dL (18 Apr 2018 11:32)  POCT Blood Glucose.: 167 mg/dL (18 Apr 2018 07:50)  POCT Blood Glucose.: 170 mg/dL (18 Apr 2018 06:07)  POCT Blood Glucose.: 144 mg/dL (18 Apr 2018 02:04)  POCT Blood Glucose.: 155 mg/dL (17 Apr 2018 22:02)  POCT Blood Glucose.: 171 mg/dL (17 Apr 2018 18:17)      LIVER FUNCTIONS - ( 18 Apr 2018 05:07 )  Alb: 2.1 g/dL / Pro: 5.8 g/dL / ALK PHOS: 71 U/L / ALT: 7 U/L DA / AST: 8 U/L / GGT: x           Hemoglobin A1C, Whole Blood: 5.6 % (04-02 @ 10:20)    Thyroid Stimulating Hormone, Serum: 2.77 uU/mL (03-30 @ 07:26)    CULTURES:       RADIOLOGY  CXR:    aspirin  chewable 81 milliGRAM(s) Oral daily  ATENolol  Tablet 25 milliGRAM(s) Oral daily  atorvastatin 40 milliGRAM(s) Oral at bedtime  calcium acetate 1334 milliGRAM(s) Oral three times a day with meals  cefepime  IVPB 1000 milliGRAM(s) IV Intermittent every 24 hours  cefepime  IVPB      citalopram 20 milliGRAM(s) Oral daily  clopidogrel Tablet 75 milliGRAM(s) Oral daily  collagenase Ointment 1 Application(s) Topical daily  epoetin vivian Injectable 6000 Unit(s) SubCutaneous <User Schedule>  finasteride 5 milliGRAM(s) Oral daily  heparin  Injectable 5000 Unit(s) SubCutaneous every 8 hours  HYDROmorphone  Injectable 1 milliGRAM(s) IV Push every 4 hours PRN  insulin glargine Injectable (LANTUS) 5 Unit(s) SubCutaneous every morning  insulin lispro (HumaLOG) corrective regimen sliding scale   SubCutaneous three times a day before meals  iron sucrose IVPB 100 milliGRAM(s) IV Intermittent every 24 hours  isosorbide   mononitrate ER Tablet (IMDUR) 30 milliGRAM(s) Oral daily  pantoprazole  Injectable 40 milliGRAM(s) IV Push daily  tamsulosin 0.4 milliGRAM(s) Oral at bedtime  vancomycin  IVPB 500 milliGRAM(s) IV Intermittent every 24 hours

## 2018-04-18 NOTE — PROGRESS NOTE ADULT - ASSESSMENT
A/P:    1) LOBITO on CKD(stage 4 ?), pts egfr is mildly worsening today ? secondary to diuresis   likely  pre-renal, with underlying ckd from diabetes and hypertensive disease  low albumin 2.1  -advise to hold lasix 2/2 LOBITO  -pro bnp 30,000 and vanco for ?hip infection  -monitor vanc trough  -holding iv fluids  urine output 1.5L/24 hours  Fena 20- post obstructive likely 2/2 BPH  passed TOV after hannon removal  c/w BPH meds  renal function improving off diuretics  renal sono neg for obstruction   baseline Cr ~3  -Avoid Nephrotoxic Meds/ Agents such as (NSAIDs, IV contrast, Aminoglycosides such as gentamicin, -Gadolinium contrast, Phosphate containing enemas, etc..)  -Adjust Medications according to eGFR  -f/u bmp daily  -f/u I/O s daily  -f/u PTH   2) HTN  -on atenolol and imdur  monitor BP  -keep bp<140/80  -add low salt diet  3) UTI: on iv antibiotics  4) ANEMIA:MF  -F/u Hb daily  -add epogen if anemia w/u c/w ckd  5)HYPONATREMIA:mild, mf like ckd/hypervolemia  -f/u Na level  daily  6) Hyperphosphatemia  - phoslo 667 2 tab TID A/P:    1) LOBITO on CKD(stage 4 ?), pts egfr is mildly worsening today ? secondary to diuresis   likely  pre-renal, with underlying ckd from diabetes and hypertensive disease  low albumin 2.1  -advise to hold lasix 2/2 LOBITO  -pro bnp 30,000 and vanco for ?hip infection  -monitor vanc trough  -holding iv fluids  urine output 1.5L/24 hours  Fena 20- post obstructive likely 2/2 BPH  passed TOV after hannon removal  c/w BPH  Cr improving off diuretics but BUN rising  renal sono neg for obstruction   baseline Cr ~3  -Avoid Nephrotoxic Meds/ Agents such as (NSAIDs, IV contrast, Aminoglycosides such as gentamicin, -Gadolinium contrast, Phosphate containing enemas, etc..)  -Adjust Medications according to eGFR  -f/u bmp daily  -f/u I/O s daily  -f/u PTH   2) HTN  -on atenolol and imdur  monitor BP  -keep bp<140/80  -add low salt diet  3) UTI: on iv antibiotics  4) ANEMIA:MF  -F/u Hb daily  -add epogen if anemia w/u c/w ckd  5)HYPONATREMIA:mild, mf like ckd/hypervolemia  -f/u Na level  daily  6) Hyperphosphatemia  - phoslo 667 2 tab TID A/P:    1) LOBITO on CKD(stage 4 ?), pts egfr is stable over last 24 hours.  off diuretics   likely  pre-renal, with underlying ckd from diabetes and hypertensive disease  low albumin 2.1  -advise to hold lasix 2/2 LOBITO  -pro bnp 30,000 and vanco for ?hip infection  -monitor vanc trough  -holding iv fluids  urine output 1.5L/24 hours  Fena 20- post obstructive likely 2/2 BPH  passed TOV after hannon removal  c/w BPH  Cr improving off diuretics but BUN rising  renal sono neg for obstruction   baseline Cr ~3  -Avoid Nephrotoxic Meds/ Agents such as (NSAIDs, IV contrast, Aminoglycosides such as gentamicin, -Gadolinium contrast, Phosphate containing enemas, etc..)  -Adjust Medications according to eGFR  -f/u bmp daily  -f/u I/O s daily  -f/u PTH   2) HTN  -on atenolol and imdur  monitor BP  -keep bp<140/80  -add low salt diet  3) UTI: on iv antibiotics  4) ANEMIA:MF  -F/u Hb daily  -added epogen   IV venofer x 5 days ordered, after which can start on po supplementation  5)HYPONATREMIA:mild, mf like ckd/hypervolemia, resolved  -f/u Na level  daily  6) Hyperphosphatemia  c/w phoslo 667 2 tab TID  monitor phosph level in AM  7) METABOLIC ACIDOSIS  please get ABG in AM if worsening acidosis  c/w phoslo

## 2018-04-18 NOTE — SWALLOW BEDSIDE ASSESSMENT ADULT - CONSISTENCIES ADMINISTERED
applesauce x3/puree mech soft/applesauce + cracker x 3 x 3/nectar thick water x 6/thin liquid cracker x 2/solid

## 2018-04-19 LAB
ANION GAP SERPL CALC-SCNC: 15 MMOL/L — SIGNIFICANT CHANGE UP (ref 5–17)
BASOPHILS # BLD AUTO: 0.1 K/UL — SIGNIFICANT CHANGE UP (ref 0–0.2)
BASOPHILS NFR BLD AUTO: 1.1 % — SIGNIFICANT CHANGE UP (ref 0–2)
BUN SERPL-MCNC: 40 MG/DL — HIGH (ref 7–18)
CALCIUM SERPL-MCNC: 7.7 MG/DL — LOW (ref 8.4–10.5)
CHLORIDE SERPL-SCNC: 97 MMOL/L — SIGNIFICANT CHANGE UP (ref 96–108)
CO2 SERPL-SCNC: 22 MMOL/L — SIGNIFICANT CHANGE UP (ref 22–31)
CREAT SERPL-MCNC: 3.48 MG/DL — HIGH (ref 0.5–1.3)
EOSINOPHIL # BLD AUTO: 0.1 K/UL — SIGNIFICANT CHANGE UP (ref 0–0.5)
EOSINOPHIL NFR BLD AUTO: 1.4 % — SIGNIFICANT CHANGE UP (ref 0–6)
GLUCOSE BLDC GLUCOMTR-MCNC: 198 MG/DL — HIGH (ref 70–99)
GLUCOSE BLDC GLUCOMTR-MCNC: 210 MG/DL — HIGH (ref 70–99)
GLUCOSE BLDC GLUCOMTR-MCNC: 280 MG/DL — HIGH (ref 70–99)
GLUCOSE BLDC GLUCOMTR-MCNC: 294 MG/DL — HIGH (ref 70–99)
GLUCOSE SERPL-MCNC: 225 MG/DL — HIGH (ref 70–99)
HCT VFR BLD CALC: 21.2 % — LOW (ref 39–50)
HGB BLD-MCNC: 7.5 G/DL — LOW (ref 13–17)
LYMPHOCYTES # BLD AUTO: 0.9 K/UL — LOW (ref 1–3.3)
LYMPHOCYTES # BLD AUTO: 16.7 % — SIGNIFICANT CHANGE UP (ref 13–44)
MAGNESIUM SERPL-MCNC: 2 MG/DL — SIGNIFICANT CHANGE UP (ref 1.6–2.6)
MCHC RBC-ENTMCNC: 32.8 PG — SIGNIFICANT CHANGE UP (ref 27–34)
MCHC RBC-ENTMCNC: 35.2 GM/DL — SIGNIFICANT CHANGE UP (ref 32–36)
MCV RBC AUTO: 93.1 FL — SIGNIFICANT CHANGE UP (ref 80–100)
MONOCYTES # BLD AUTO: 0.5 K/UL — SIGNIFICANT CHANGE UP (ref 0–0.9)
MONOCYTES NFR BLD AUTO: 9.6 % — SIGNIFICANT CHANGE UP (ref 2–14)
NEUTROPHILS # BLD AUTO: 4 K/UL — SIGNIFICANT CHANGE UP (ref 1.8–7.4)
NEUTROPHILS NFR BLD AUTO: 71.2 % — SIGNIFICANT CHANGE UP (ref 43–77)
PHOSPHATE SERPL-MCNC: 3.3 MG/DL — SIGNIFICANT CHANGE UP (ref 2.5–4.5)
PLATELET # BLD AUTO: 186 K/UL — SIGNIFICANT CHANGE UP (ref 150–400)
POTASSIUM SERPL-MCNC: 4.1 MMOL/L — SIGNIFICANT CHANGE UP (ref 3.5–5.3)
POTASSIUM SERPL-SCNC: 4.1 MMOL/L — SIGNIFICANT CHANGE UP (ref 3.5–5.3)
RBC # BLD: 2.27 M/UL — LOW (ref 4.2–5.8)
RBC # FLD: 13.1 % — SIGNIFICANT CHANGE UP (ref 10.3–14.5)
SODIUM SERPL-SCNC: 134 MMOL/L — LOW (ref 135–145)
WBC # BLD: 5.6 K/UL — SIGNIFICANT CHANGE UP (ref 3.8–10.5)
WBC # FLD AUTO: 5.6 K/UL — SIGNIFICANT CHANGE UP (ref 3.8–10.5)

## 2018-04-19 RX ORDER — INSULIN GLARGINE 100 [IU]/ML
10 INJECTION, SOLUTION SUBCUTANEOUS EVERY MORNING
Qty: 0 | Refills: 0 | Status: DISCONTINUED | OUTPATIENT
Start: 2018-04-19 | End: 2018-04-20

## 2018-04-19 RX ORDER — IPRATROPIUM/ALBUTEROL SULFATE 18-103MCG
3 AEROSOL WITH ADAPTER (GRAM) INHALATION EVERY 6 HOURS
Qty: 0 | Refills: 0 | Status: DISCONTINUED | OUTPATIENT
Start: 2018-04-19 | End: 2018-04-20

## 2018-04-19 RX ORDER — CALCIUM ACETATE 667 MG
667 TABLET ORAL
Qty: 0 | Refills: 0 | Status: DISCONTINUED | OUTPATIENT
Start: 2018-04-19 | End: 2018-04-20

## 2018-04-19 RX ORDER — ZOLPIDEM TARTRATE 10 MG/1
10 TABLET ORAL AT BEDTIME
Qty: 0 | Refills: 0 | Status: DISCONTINUED | OUTPATIENT
Start: 2018-04-19 | End: 2018-04-20

## 2018-04-19 RX ORDER — FUROSEMIDE 40 MG
40 TABLET ORAL
Qty: 0 | Refills: 0 | Status: DISCONTINUED | OUTPATIENT
Start: 2018-04-19 | End: 2018-04-20

## 2018-04-19 RX ADMIN — Medication 1334 MILLIGRAM(S): at 12:40

## 2018-04-19 RX ADMIN — Medication 81 MILLIGRAM(S): at 12:39

## 2018-04-19 RX ADMIN — PANTOPRAZOLE SODIUM 40 MILLIGRAM(S): 20 TABLET, DELAYED RELEASE ORAL at 12:38

## 2018-04-19 RX ADMIN — IRON SUCROSE 210 MILLIGRAM(S): 20 INJECTION, SOLUTION INTRAVENOUS at 17:11

## 2018-04-19 RX ADMIN — CLOPIDOGREL BISULFATE 75 MILLIGRAM(S): 75 TABLET, FILM COATED ORAL at 12:39

## 2018-04-19 RX ADMIN — HEPARIN SODIUM 5000 UNIT(S): 5000 INJECTION INTRAVENOUS; SUBCUTANEOUS at 05:29

## 2018-04-19 RX ADMIN — Medication 667 MILLIGRAM(S): at 18:11

## 2018-04-19 RX ADMIN — CEFEPIME 100 MILLIGRAM(S): 1 INJECTION, POWDER, FOR SOLUTION INTRAMUSCULAR; INTRAVENOUS at 05:30

## 2018-04-19 RX ADMIN — HEPARIN SODIUM 5000 UNIT(S): 5000 INJECTION INTRAVENOUS; SUBCUTANEOUS at 14:12

## 2018-04-19 RX ADMIN — Medication 3 MILLILITER(S): at 20:42

## 2018-04-19 RX ADMIN — INSULIN GLARGINE 10 UNIT(S): 100 INJECTION, SOLUTION SUBCUTANEOUS at 09:20

## 2018-04-19 RX ADMIN — ATENOLOL 25 MILLIGRAM(S): 25 TABLET ORAL at 05:30

## 2018-04-19 RX ADMIN — HEPARIN SODIUM 5000 UNIT(S): 5000 INJECTION INTRAVENOUS; SUBCUTANEOUS at 21:32

## 2018-04-19 RX ADMIN — Medication 3 MILLILITER(S): at 10:40

## 2018-04-19 RX ADMIN — Medication 100 MILLIGRAM(S): at 05:30

## 2018-04-19 RX ADMIN — Medication 3: at 08:10

## 2018-04-19 RX ADMIN — Medication 3: at 12:39

## 2018-04-19 RX ADMIN — FINASTERIDE 5 MILLIGRAM(S): 5 TABLET, FILM COATED ORAL at 12:39

## 2018-04-19 RX ADMIN — TAMSULOSIN HYDROCHLORIDE 0.4 MILLIGRAM(S): 0.4 CAPSULE ORAL at 21:33

## 2018-04-19 RX ADMIN — Medication 1 APPLICATION(S): at 12:39

## 2018-04-19 RX ADMIN — CITALOPRAM 20 MILLIGRAM(S): 10 TABLET, FILM COATED ORAL at 12:39

## 2018-04-19 RX ADMIN — ISOSORBIDE MONONITRATE 30 MILLIGRAM(S): 60 TABLET, EXTENDED RELEASE ORAL at 12:39

## 2018-04-19 RX ADMIN — ATORVASTATIN CALCIUM 40 MILLIGRAM(S): 80 TABLET, FILM COATED ORAL at 21:32

## 2018-04-19 RX ADMIN — Medication 1334 MILLIGRAM(S): at 08:10

## 2018-04-19 RX ADMIN — Medication 2: at 17:15

## 2018-04-19 RX ADMIN — Medication 3 MILLILITER(S): at 17:36

## 2018-04-19 NOTE — PROGRESS NOTE ADULT - PROBLEM SELECTOR PLAN 3
lantus 5 Units  -HSS  Hba1c 7.8  endo dr christianson
2/2 prerenal azotemia vs cardiorenal syndrome  hold IVF for now given possible congestion noted on CXR  - urine lytes, calculate FENA  - hannon, renal dose cefepime.
lantus 5 Units  -HSS  Hba1c 7.8  endo dr christianson

## 2018-04-19 NOTE — PROGRESS NOTE ADULT - SUBJECTIVE AND OBJECTIVE BOX
pt seen and examined.pts current chart reviewed and case discussed with resident covering.    SUBJECTIVE:  Patient reports feeling well, denies fever, chills, SOB, palpitations, chest pain, nausea, vomiting, diarrhea or constipation. Voiding without difficulty.    REVIEW OF SYSTEMS:  CONSTITUTIONAL: No weakness, fevers or chills  EYES/ENT: No visual changes;  No vertigo or throat pain   NECK: No pain or stiffness  RESPIRATORY: No cough, wheezing, hemoptysis; No shortness of breath  CARDIOVASCULAR: No chest pain or palpitations  GASTROINTESTINAL: No abdominal or epigastric pain. No nausea, vomiting, or hematemesis; No diarrhea or constipation. No melena or hematochezia.  GENITOURINARY: No dysuria, frequency , hematuria, flank pain or nocturia  NEUROLOGICAL: No numbness or weakness  SKIN: No itching, burning, rashes, or lesions   All other review of systems is negative unless indicated above    Current meds:    ALBUTerol/ipratropium for Nebulization 3 milliLiter(s) Nebulizer every 6 hours  aspirin  chewable 81 milliGRAM(s) Oral daily  ATENolol  Tablet 25 milliGRAM(s) Oral daily  atorvastatin 40 milliGRAM(s) Oral at bedtime  calcium acetate 1334 milliGRAM(s) Oral three times a day with meals  cefepime  IVPB 1000 milliGRAM(s) IV Intermittent every 24 hours  cefepime  IVPB      citalopram 20 milliGRAM(s) Oral daily  clopidogrel Tablet 75 milliGRAM(s) Oral daily  collagenase Ointment 1 Application(s) Topical daily  epoetin vivian Injectable 6000 Unit(s) SubCutaneous <User Schedule>  finasteride 5 milliGRAM(s) Oral daily  heparin  Injectable 5000 Unit(s) SubCutaneous every 8 hours  HYDROmorphone  Injectable 1 milliGRAM(s) IV Push every 4 hours PRN  insulin glargine Injectable (LANTUS) 10 Unit(s) SubCutaneous every morning  insulin lispro (HumaLOG) corrective regimen sliding scale   SubCutaneous three times a day before meals  iron sucrose IVPB 100 milliGRAM(s) IV Intermittent every 24 hours  isosorbide   mononitrate ER Tablet (IMDUR) 30 milliGRAM(s) Oral daily  pantoprazole  Injectable 40 milliGRAM(s) IV Push daily  tamsulosin 0.4 milliGRAM(s) Oral at bedtime  vancomycin  IVPB 500 milliGRAM(s) IV Intermittent every 24 hours      Vital Signs    T(F): 98.3 (18 @ 05:12), Max: 98.3 (18 @ 05:12)  HR: 78 (18 @ 05:12) (67 - 78)  BP: 129/58 (18 @ 05:12) (116/58 - 146/76)  ABP: --  RR: 18 (18 @ 05:12) (16 - 18)  SpO2: 95% (18 @ 05:12) (95% - 100%)  Wt(kg): --  CVP(cm H2O): --  CO: --  PCWP: --    I and O's:     @ 07:01  -   @ 07:00  --------------------------------------------------------  IN:    Solution: 100 mL  Total IN: 100 mL    OUT:    Indwelling Catheter - Urethral: 1550 mL  Total OUT: 1550 mL    Total NET: -1450 mL       @ 07:01  -   @ 10:40  --------------------------------------------------------  IN:  Total IN: 0 mL    OUT:    Voided: 250 mL  Total OUT: 250 mL    Total NET: -250 mL        Daily     Daily Weight in k.7 (2018 05:12)    PHYSICAL EXAM:  Constitutional: well developed, well nourished  and in nad  HEENT: PERRLA,  no icteric sclera and mild pallor of conjunctiva noted  Neck: No JVD, thyromegaly or adenopathy  Respiratory: no rales, wheezing or rhonchi  Cardiovascular: S1 and S2 normally heard  Gastrointestinal: soft, nondistended, nontender and normal bowel sounds heard  Extremities: No peripheral edema or cyanosis  Neurological: A/O x 3, no focal deficits  : No flank or cva tenderness palpated.  Skin: No rashes      LABS:    CBC:                          7.5    5.6   )-----------( 186      ( 2018 06:48 )             21.2           BMP:        134<L>  |  97  |  40<H>  ----------------------------<  225<H>  4.1   |  22  |  3.48<H>      141  |  110<H>  |  83<H>  ----------------------------<  110<H>  3.8   |  18<L>  |  3.31<H>      132<L>  |  97  |  38<H>  ----------------------------<  108<H>  4.5   |  27  |  3.38<H>    Ca    7.7<L>      2018 06:48  Ca    7.5<L>      2018 05:07  Ca    7.8<L>      2018 06:35  Phos  3.3       Phos  4.9       Mg     2.0       Mg     1.9         TPro  5.8<L>  /  Alb  2.1<L>  /  TBili  1.0  /  DBili  x   /  AST  8<L>  /  ALT  7<L>  /  AlkPhos  71    TPro  5.1<L>  /  Alb  1.9<L>  /  TBili  0.5  /  DBili  x   /  AST  18  /  ALT  10  /  AlkPhos  99        Intact PTH: 161 pg/mL ( @ 13:28)      URINE STUDIES:    Osmolality, Random Urine: 281 mos/kg ( @ 13:54)  Sodium, Random Urine: 115 mmol/L ( @ 13:54)  Creatinine, Random Urine: <13 mg/dL ( @ 13:54)    RADIOLOGY & ADDITIONAL STUDIES:    < from: US Renal (18 @ 13:03) >  FINDINGS:  The right kidney measures 10.4 x 4.4 x 4.6 cm.    The left kidney measures 9.5 x 5.3 x 3.8 cm.    There is no hydronephrosis. Small bilateral renal cysts are noted   measuring up to 1.4 cm on the left kidney    Echogenic kidneys noted which may represent medical renal disease    IMPRESSION:  No hydronephrosis.     < end of copied text >    < from: CT Hip No Cont, Right (18 @ 13:59) >  IMPRESSION:    1.  Status post right hip hemiarthroplasty with large complex fluid   collection about the right hip arthroplasty and decompressing on the   right lateral thigh. Findings are thought to represent an evolving   hematoma. Superimposed infection is not excluded.  2.  Intramuscular hematoma within the right gluteus medius muscle.   3.  Subfascial fluid tracking along the vastus lateralis, likely evolving   hematoma. Superimposed infection is not excluded.    < end of copied text >

## 2018-04-19 NOTE — PROGRESS NOTE ADULT - PROBLEM SELECTOR PLAN 2
LOBITO 2/2 dehydration on underlying CKD due to DM,HTN  FeNa 20, post obstructive due to BPH  -Renal sono -ve for obstruction  -net negative  -daily I/Os  -added epogen  -IV venofar for 5 days for anemia. can switch to oral on dc
on vancomycin and cefepime   Dr Lay seen and closed reduction done  await blood culture
LOBITO 2/2 dehydration on underlying CKD due to DM,HTN  FeNa 20, post obstructive due to BPH  -Renal sono -ve for obstruction  -net negative  -daily I/Os  -added epogen  -IV venofar for 5 days for anemia. can switch to oral on dc

## 2018-04-19 NOTE — PROGRESS NOTE ADULT - PROBLEM SELECTOR PLAN 1
2/2 hypoventilation possibly 2/2 aspiration pna vs CHF exacerbation  new right pleural effusion and bilateral congestion   s/p 1 dose vancomycin and cefepime (for possible infected right hip incision)  - mechanical ventilation, propofol sedation  - follow up BNP, procalcitonin  - if BP tolerates, diurese.
CHF and suspected aspiration Pneumonia  -ICU DG on 4/18  -intubated on 4/15, extubated on 4/16  -lasix 80 daily on held due to worsening cr.  cardio Dr sow  -Passed speech and swallow
CHF and suspected aspiration Pneumonia  -ICU DG on 4/18  -intubated on 4/15, extubated on 4/16  -lasix 80 daily on held due to worsening cr.  cardio Dr sow  -Passed speech and swallow

## 2018-04-19 NOTE — PROGRESS NOTE ADULT - PROBLEM SELECTOR PLAN 5
Closed fracture of right hip with delayed healing,     -on vancomycin and cefepime started 4/15  -vanc trough 14 on 4/18  Dr Lay seen and closed reduction done  -no concern for infection from hip as per ortho,   -CT hip shows hematoma as above   -staples removed on 4/19  DISPO plan in am with oral abx
Closed fracture of right hip with delayed healing,     -on vancomycin and cefepime started 4/15  -vanc trough 14 on 4/18  Dr Lay seen and closed reduction done  -no concern for infection from hip as per ortho,   -CT hip shows hematoma as above   ortho follow up appreciated
on isosorbide and atenolol

## 2018-04-19 NOTE — PROGRESS NOTE ADULT - PROBLEM SELECTOR PROBLEM 1
Acute respiratory failure with hypoxia and hypercapnia

## 2018-04-19 NOTE — PROGRESS NOTE ADULT - PROBLEM SELECTOR PROBLEM 2
Closed fracture of right hip with delayed healing, subsequent encounter
LOBITO (acute kidney injury)
LOBITO (acute kidney injury)

## 2018-04-19 NOTE — PROGRESS NOTE ADULT - SUBJECTIVE AND OBJECTIVE BOX
PGY 1 Note discussed with supervising resident and primary attending    Patient is a 89y old  Male who presents with a chief complaint of resp distress (15 Apr 2018 06:00)      INTERVAL HPI/OVERNIGHT EVENTS: offers no new complaints; current symptoms resolving    MEDICATIONS  (STANDING):  ALBUTerol/ipratropium for Nebulization 3 milliLiter(s) Nebulizer every 6 hours  aspirin  chewable 81 milliGRAM(s) Oral daily  ATENolol  Tablet 25 milliGRAM(s) Oral daily  atorvastatin 40 milliGRAM(s) Oral at bedtime  calcium acetate 667 milliGRAM(s) Oral three times a day with meals  cefepime  IVPB 1000 milliGRAM(s) IV Intermittent every 24 hours  cefepime  IVPB      citalopram 20 milliGRAM(s) Oral daily  clopidogrel Tablet 75 milliGRAM(s) Oral daily  collagenase Ointment 1 Application(s) Topical daily  epoetin vivian Injectable 6000 Unit(s) SubCutaneous <User Schedule>  finasteride 5 milliGRAM(s) Oral daily  furosemide    Tablet 40 milliGRAM(s) Oral <User Schedule>  heparin  Injectable 5000 Unit(s) SubCutaneous every 8 hours  insulin glargine Injectable (LANTUS) 10 Unit(s) SubCutaneous every morning  insulin lispro (HumaLOG) corrective regimen sliding scale   SubCutaneous three times a day before meals  iron sucrose IVPB 100 milliGRAM(s) IV Intermittent every 24 hours  isosorbide   mononitrate ER Tablet (IMDUR) 30 milliGRAM(s) Oral daily  pantoprazole  Injectable 40 milliGRAM(s) IV Push daily  tamsulosin 0.4 milliGRAM(s) Oral at bedtime  vancomycin  IVPB 500 milliGRAM(s) IV Intermittent every 24 hours    MEDICATIONS  (PRN):  HYDROmorphone  Injectable 1 milliGRAM(s) IV Push every 4 hours PRN Severe Pain (7 - 10)  zolpidem 10 milliGRAM(s) Oral at bedtime PRN Insomnia      __________________________________________________  REVIEW OF SYSTEMS:    CONSTITUTIONAL: No fever,   EYES: no acute visual disturbances  NECK: No pain or stiffness  RESPIRATORY: No cough; No shortness of breath  CARDIOVASCULAR: No chest pain, no palpitations  GASTROINTESTINAL: No pain. No nausea or vomiting; No diarrhea   NEUROLOGICAL: No headache or numbness, no tremors  MUSCULOSKELETAL: No joint pain, no muscle pain  GENITOURINARY: no dysuria, no frequency, no hesitancy  PSYCHIATRY: no depression , no anxiety  ALL OTHER  ROS negative        Vital Signs Last 24 Hrs  T(C): 36.6 (19 Apr 2018 14:55), Max: 36.8 (19 Apr 2018 05:12)  T(F): 97.9 (19 Apr 2018 14:55), Max: 98.3 (19 Apr 2018 05:12)  HR: 73 (19 Apr 2018 14:55) (73 - 79)  BP: 128/58 (19 Apr 2018 14:55) (116/58 - 134/56)  BP(mean): --  RR: 16 (19 Apr 2018 14:55) (16 - 18)  SpO2: 96% (19 Apr 2018 14:55) (95% - 96%)    ________________________________________________  PHYSICAL EXAM:  GENERAL: NAD  HEENT: Normocephalic;  conjunctivae and sclerae clear; moist mucous membranes;   NECK : supple  CHEST/LUNG: Clear to auscultation bilaterally with good air entry   HEART: S1 S2  regular; no murmurs, gallops or rubs  ABDOMEN: Soft, Nontender, Nondistended; Bowel sounds present  EXTREMITIES: no cyanosis; no edema; no calf tenderness  SKIN: warm and dry; no rash  NERVOUS SYSTEM:  Awake and alert; Oriented  to place, person and time ; no new deficits    _________________________________________________  LABS:                        7.5    5.6   )-----------( 186      ( 19 Apr 2018 06:48 )             21.2     04-19    134<L>  |  97  |  40<H>  ----------------------------<  225<H>  4.1   |  22  |  3.48<H>    Ca    7.7<L>      19 Apr 2018 06:48  Phos  3.3     04-19  Mg     2.0     04-19    TPro  5.8<L>  /  Alb  2.1<L>  /  TBili  1.0  /  DBili  x   /  AST  8<L>  /  ALT  7<L>  /  AlkPhos  71  04-18        CAPILLARY BLOOD GLUCOSE      POCT Blood Glucose.: 210 mg/dL (19 Apr 2018 16:27)  POCT Blood Glucose.: 294 mg/dL (19 Apr 2018 12:09)  POCT Blood Glucose.: 280 mg/dL (19 Apr 2018 08:03)  POCT Blood Glucose.: 215 mg/dL (18 Apr 2018 21:34)  POCT Blood Glucose.: 202 mg/dL (18 Apr 2018 17:01)        RADIOLOGY & ADDITIONAL TESTS:    Imaging Personally Reviewed:  YES    Consultant(s) Notes Reviewed:   YES    Care Discussed with Consultants :     Plan of care was discussed with patient and /or primary care giver; all questions and concerns were addressed and care was aligned with patient's wishes.

## 2018-04-19 NOTE — PROGRESS NOTE ADULT - PROBLEM SELECTOR PLAN 4
on isosorbide and atenolol  hold lasix.   DASH diet
BSL controlled  - npo, hss, decreased Lantus to 10 U qhs  hold oral hypoglycemics.
on isosorbide and atenolol  hold lasix.   DASH diet

## 2018-04-19 NOTE — PROGRESS NOTE ADULT - ATTENDING COMMENTS
pt seen and examined with renal resident  All vitals,meds and labs reviewed  pts renal function is stable last 24 hrs but pts Na level is mildly low  we will restart Lasix 40mg q other day to prevent fluid overload  pts renal prognosis ,care and plan d/w pts son at bedside  pts family wants dialysis for esrd when hd is needed  pt s son was advised to have pt f/u with vascular surgery for AVF placement for future hd   Rest as per medical team and as per above renal resident note .

## 2018-04-19 NOTE — PROGRESS NOTE ADULT - ASSESSMENT
-S/P RESPIRATORY FAILURE 2/2 PN(HCAP)/CHF-IMPROVED-NET DIURESIS  - NO WOUND INFECTION AS PER ORTHO  -S/P POSTERIOR DISLOCATION OF HIP-REDUCED AND IN POSITION ON CURRENT CT  -ACUTE ON CKD-HOLDING LASIX PER RENAL  -ANEMIA-MULTIFACTORIAL  -HX; CAD/CABG,DM,HTN,HLD,BPH,S/P ORIF RT HIP 3/30/18    PLAN; ANTIBX-MAXIPIME/VANCO/02           DVT PPX           ID/ORTHO/RENAL F/U           PROCALCITONIN           PHY TX           EPO/VENOFER

## 2018-04-19 NOTE — PROGRESS NOTE ADULT - SUBJECTIVE AND OBJECTIVE BOX
Meds:  cefepime  IVPB 1000 milliGRAM(s) IV Intermittent every 24 hours  Vancomycin 500 mg IVPB q day.    Allergies:  Allergies    No Known Allergies    Intolerances        ROS  [  ] UNABLE TO ELICIT    General:  [  ] None  [  ] Fever  [  ] Chills  [ x ] Malaise    Skin:  [ x ] None [  ] Rash  [  ] Wound  [  ] Ulcer    HEENT:  [ x ] None  [  ] Sore Throat  [  ] Nasal congestion/ runny nose  [  ] Photophobia [  ] Neck pain      Chest:  [  ] None   [  ] SOB  [ x ] Cough  [  ] None    Cardiovascular:   [ x ] None  [  ] CP  [  ] Palpitation    Gastrointestinal:  [ x ] None  [  ] Abd pain   [  ] Nausea    [  ] Vomiting   [  ] Diarrhea	     Genitourinary:  [ x ] None [  ] Polyuria   [  ] Urgency  [  ] Frequency  [  ] Dysuria    [  ]  Hematuria       Musculoskeletal:  [  ] None [  ] Back Pain	[  ] Body aches  [  ] Joint pain [ x ] Weakness    Neurological: [  ] None [  ]Dizziness  [  ]Visual Disturbance  [  ]Headaches   [ x ] Weakness          PHYSICAL EXAM:  Vital Signs Last 24 Hrs  T(C): 36.8 (19 Apr 2018 05:12), Max: 36.8 (19 Apr 2018 05:12)  T(F): 98.3 (19 Apr 2018 05:12), Max: 98.3 (19 Apr 2018 05:12)  HR: 78 (19 Apr 2018 05:12) (67 - 78)  BP: 129/58 (19 Apr 2018 05:12) (116/58 - 146/76)  BP(mean): --  RR: 18 (19 Apr 2018 05:12) (16 - 18)  SpO2: 95% (19 Apr 2018 05:12) (95% - 100%)    Constitutional:    HEENT: [ x ] Wnl  [  ] Pharyngeal congestion [ x ] Extubated.    Neck:  [ x ] Supple  [  ]Lymphadenopathy  [ x ] No JVD   [  ] JVD  [  ] Masses   [  ] WNL    CHEST/Respiratory:  [  ]Clear to auscultation  [ x ] Rales   [  ] Rhonchi   [  ] Wheezing     [  ] Chest Tenderness      Cardiovascular:  [ x ] Reg S1 S2   [  ] Irreg S1 S2   [ x ]No Murmur  [  ] +ve Murmurs  [  ]Systolic [  ]Diastolic      Abdomen:  [ x ] Soft  [ x ] No tendrerness  [  ] Tenderness  [  ] Organomegaly  [  ] ABD Distention  [  ] Rigidity                       [ x ] No Regidity                       [ x ] No Rebound Tenderness  [  ] No Guarding Rigidity  [  ] Rebound Tenderness[  ] Guarding Rigidity                          [ x ]  +ve Bowel Sounds  [  ] Decreased Bowel Sounds    [  ] Absent Bowel Sounds                            Extremities: [  ] No edema [ x ] Edema  [  ] Clubbing   [  ] Cyanosis                         [ x ] No Tender Calf muscles  [  ] Tender Calf muscles                        [ x ] Palpable peripheral pulses    Neurological: [  ] Awake  [  ] Alert  [  ] Oriented  x                             [  ] Confused  [  ] Drowsy  [ x ] respond to painful stimuli  [  ] Unresponsive    Skin:  [  ] Intact [  ] Redness [  ] Thrombophlebitis  [  ] Rashes  [  ] Dry  [ x ] Right hip surgical dry wound, and staples in place    Ortho:  [  ] Joint Swelling  [  ] Joint erythema [  ] Joint tenderness                [  ] Increased temp. to touch  [  ] DJD [ x ] WNL          LABS/DIAGNOSTIC TESTS                        7.5    5.6   )-----------( 186      ( 19 Apr 2018 06:48 )             21.2   04-19    134<L>  |  97  |  40<H>  ----------------------------<  225<H>  4.1   |  22  |  3.48<H>    Ca    7.7<L>      19 Apr 2018 06:48  Phos  3.3     04-19  Mg     2.0     04-19    TPro  5.8<L>  /  Alb  2.1<L>  /  TBili  1.0  /  DBili  x   /  AST  8<L>  /  ALT  7<L>  /  AlkPhos  71  04-18         CULTURES:     Culture - Blood (04.15.18 @ 12:42)    Specimen Source: .Blood Blood    Culture Results:   No growth to date.    Culture - Blood (04.15.18 @ 12:41)    Specimen Source: .Blood Blood    Culture Results:   No growth to date.    Culture - Urine (04.15.18 @ 12:09)    Specimen Source: .Urine Clean Catch (Midstream)    Culture Results:   No growth          RADIOLOGY:    EXAM:  CT HIP ONLY RT                            PROCEDURE DATE:  04/18/2018          INTERPRETATION:  CT HIP ONLY RT dated 4/18/2018 1:59 PM     INDICATION: Right hip pain. Postoperative evaluation.    COMPARISON: Pelvic radiograph dated 4/15/2018and dating back to 3/29/2018    TECHNIQUE: CT imaging of the right hip was performed. The data was   reformatted in the axial, coronal, and sagittal planes. Additionally, 3-D   reformatted imaging was created at a separate workstation.    FINDINGS:    OSSEOUS STRUCTURES: The patient is status post right hip   hemiarthroplasty. The hip is located within the acetabulum. No   periprosthetic fracture is identified. There is no subsidence. The pubic   symphysis is preserved. Mild spurring at the rightsacroiliac joint.  SYNOVIUM/ JOINT FLUID: There is a large fluid surrounding the right hip   arthroplasty decompressing posteriorly and tracking along the greater   trochanter inferiorly along the lateral thigh. Some of this fluid   demonstrates increased density suggestive of hemorrhagic components.  TENDONS: The proximal hamstring tendon is intact. Evaluation of the   iliopsoas is limited by beam hardening artifact but is intact.  MUSCLES: There is enlargement and hyperdensity within the gluteus medius   muscle suggesting intramuscular hematoma. There is attenuation of the   performance muscle. There is fluid tracking along the subcutaneous   fascial plane of the vastus lateralis muscle.  NEUROVASCULAR STRUCTURES: Moderate vascular calcifications are noted.  SUBCUTANEOUS SOFT TISSUES: There is mild to moderate right hip soft   tissue swelling with skin thickening. Surgical clips are seen overlying   the right hip in keeping with recent surgery.  INTRAPELVIC SOFT TISSUES: Small fat-containing inguinal hernia.    3-D reformatted imaging confirms these findings.    IMPRESSION:    1.  Status post right hip hemiarthroplasty with large complex fluid   collection about the right hip arthroplasty and decompressing on the   right lateral thigh. Findings are thought to represent an evolving   hematoma. Superimposed infection is not excluded.  2.  Intramuscular hematoma within the right gluteus medius muscle.   3.  Subfascial fluid tracking along the vastus lateralis, likely evolving   hematoma. Superimposed infection is not excluded.          CXR:    EXAM:  XR CHEST AP OR PA 1V                            PROCEDURE DATE:  04/15/2018          INTERPRETATION:  PORTABLE CHEST X-RAY    HISTORY: s/p intubation, confirm placement.    COMPARISON: 4/15/2018.    FINDINGS/  IMPRESSION:  Interval intubation with ET tube tip approximately 3.3 cm above the   riaz. No pneumothorax.    Pulmonary vascular congestion, worsening bilateral perihilar airspace   opacities. Small bilateral pleural effusions are unchanged.    The cardiac silhouette is not accurately assessed by AP technique. Aortic   calcifications. Sternotomy wires.        EXAM:  CT BRAIN                            PROCEDURE DATE:  04/15/2018          INTERPRETATION:  CLINICAL INFORMATION: Altered mental status.    COMPARISON: None available.    PROCEDURE:   Noncontrast CT of the Head was performed from the skull base to the   vertex. Coronal and Sagittal reformats were obtained.    FINDINGS:    There is no acute intracranial hemorrhage, mass effect, midline shift,   extra-axial collection, hydrocephalus, or evidence of acute vascular   territorial infarction. Mild patchy hypodensities within the   periventricular and subcortical white matter, although nonspecific,   likely reflect chronic microvascular disease. Cerebral volume loss   results in prominence of the ventricles and sulci.    The visualized paranasal sinuses and mastoid air cells are clear.   Visualized osseous structures are intact.    IMPRESSION:     No acute intracranial hemorrhage, mass effect, or evidence of acute   vascular territorial infarction.    If clinical symptoms persist or worsen, short-term repeat CT head or more   sensitive evaluation with brain MRI may be obtained, if no   contraindications exist.          Assessment and Recommendation:   89yo male from home, lives with wife, no home attendant, PMH CAD s/p CABG (12-13 years ago), s/p left hip pinning for left hip fracture (8 years ago), DM, HTN, BPH with urinary retention p/w worsening shortness of breath and altered mental status. History and ROS obtained from son (Esteban [phone], at bedside).  Patient was recently admitted to [facility] from 3/29 to 4/6/2018 for right hip fracture after mechanical fall.   Patient was intubated and admitted to ICU and was given Vancomycin and Cefepime.  4/16/18 Patient was extubated and tolerated it well.  Patient was transferred to a regular floor on 4/17/18.  4/18/18 WBC is increased but patient feels better.  CT of the hip suggested for collection(hematoma and/or infection).  Orthopedic surgeon indicated that he would not do any intervention to hip findings as it represent hematoma and would resolve on its own.    Problem/Recommendation - 1:  Problem: Pneumonia, bacterial.   Recommendation:   1- UA & CS.  2- Follow Blood culture to final report.  3- Continue Vancomycin 500 mg IVPB q day to change upon discharge to po doxycycline 100 mg po q 24 hours till 4/25/18 .  4- Continue IV Cefepime 1 gm IVPB q day to change upon discharge to po Ceftin 250 mg po q 12 hours till 4/25/18.  5- Fluid and electrolytes management.  6- CBC and BMP follow up.   7- Follow Vancomycin trough closely and adjust Vancomycin dose accordingly.    Problem/Recommendation - 2:  ·  Problem: Postoperative wound infection, sequela.    Recommendation:   1- Orthopedic follow up.  2- ESR and CRP weekly x 6 weeks for follow up as orthpedic feels that CT findings represent hematoma not infection.  3- Wound care right hip.     Problem/Recommendation - 3:  ·  Problem: Type 2 diabetes mellitus with stage 3 chronic kidney disease, unspecified long term insulin use status.    Recommendation:   1- Blood sugar monitoring and control.  2- Accu-Cheks with coverage.  3- 1800 karolyn ADA diet.  4- Follow HB A1C.     Problem/Recommendation - 4:  ·  Problem: Essential hypertension.    Recommendation:   1- Monitor Blood pressure closely.  2- Blood pressure control.  3- BP. meds as per cardiology and primary care team.     Discussed with medical resident.

## 2018-04-19 NOTE — PROGRESS NOTE ADULT - SUBJECTIVE AND OBJECTIVE BOX
Patient is a 89y old  Male who presents with a chief complaint of resp distress (15 Apr 2018 06:00)      INTERVAL HPI/OVERNIGHT EVENTS:  doing well,ngt and hannon out  had breakfast    VITAL SIGNS:  T(F): 98.3 (04-19-18 @ 05:12)  HR: 78 (04-19-18 @ 05:12)  BP: 129/58 (04-19-18 @ 05:12)  RR: 18 (04-19-18 @ 05:12)  SpO2: 95% (04-19-18 @ 05:12)  Wt(kg): --  I&O's Detail          REVIEW OF SYSTEMS:    CONSTITUTIONAL:  No fevers, chills, sweats    HEENT:  Eyes:  No diplopia or blurred vision. ENT:  No earache, sore throat or runny nose.    CARDIOVASCULAR:  No pressure, squeezing, tightness, or heaviness about the chest; no palpitations.    RESPIRATORY:  Per HPI    GASTROINTESTINAL:  No abdominal pain, nausea, vomiting or diarrhea.    GENITOURINARY:  No dysuria, frequency or urgency.    NEUROLOGIC:  No paresthesias, fasciculations, seizures or weakness.    PSYCHIATRIC:  No disorder of thought or mood.      PHYSICAL EXAM:    Constitutional:no complaints  ENMT:atnc  Neck:supple  Respiratory:clear  Cardiovascular:rr  Gastrointestinal:soft  Extremities:no edema, rt hip clean  Vascular:intact  Neurological:non focal  Musculoskeletal:no edema, normal rom    MEDICATIONS  (STANDING):  aspirin  chewable 81 milliGRAM(s) Oral daily  ATENolol  Tablet 25 milliGRAM(s) Oral daily  atorvastatin 40 milliGRAM(s) Oral at bedtime  calcium acetate 1334 milliGRAM(s) Oral three times a day with meals  cefepime  IVPB 1000 milliGRAM(s) IV Intermittent every 24 hours  cefepime  IVPB      citalopram 20 milliGRAM(s) Oral daily  clopidogrel Tablet 75 milliGRAM(s) Oral daily  collagenase Ointment 1 Application(s) Topical daily  epoetin vivian Injectable 6000 Unit(s) SubCutaneous <User Schedule>  finasteride 5 milliGRAM(s) Oral daily  heparin  Injectable 5000 Unit(s) SubCutaneous every 8 hours  insulin glargine Injectable (LANTUS) 10 Unit(s) SubCutaneous every morning  insulin lispro (HumaLOG) corrective regimen sliding scale   SubCutaneous three times a day before meals  iron sucrose IVPB 100 milliGRAM(s) IV Intermittent every 24 hours  isosorbide   mononitrate ER Tablet (IMDUR) 30 milliGRAM(s) Oral daily  pantoprazole  Injectable 40 milliGRAM(s) IV Push daily  tamsulosin 0.4 milliGRAM(s) Oral at bedtime  vancomycin  IVPB 500 milliGRAM(s) IV Intermittent every 24 hours    MEDICATIONS  (PRN):  HYDROmorphone  Injectable 1 milliGRAM(s) IV Push every 4 hours PRN Severe Pain (7 - 10)      Allergies    No Known Allergies            LABS:                        7.5    5.6   )-----------( 186      ( 19 Apr 2018 06:48 )             21.2     04-19    134<L>  |  97  |  40<H>  ----------------------------<  225<H>  4.1   |  22  |  3.48<H>    Ca    7.7<L>      19 Apr 2018 06:48  Phos  3.3     04-19  Mg     2.0     04-19    TPro  5.8<L>  /  Alb  2.1<L>  /  TBili  1.0  /  DBili  x   /  AST  8<L>  /  ALT  7<L>  /  AlkPhos  71  04-18              CAPILLARY BLOOD GLUCOSE      POCT Blood Glucose.: 280 mg/dL (19 Apr 2018 08:03)  POCT Blood Glucose.: 215 mg/dL (18 Apr 2018 21:34)  POCT Blood Glucose.: 202 mg/dL (18 Apr 2018 17:01)  POCT Blood Glucose.: 245 mg/dL (18 Apr 2018 11:32)    pro-bnp [id number] 04-16 @ 04:5      RADIOLOGY & ADDITIONAL TESTS:        Ct   EXAM:  CT HIP ONLY RT                            PROCEDURE DATE:  04/18/2018          INTERPRETATION:  CT HIP ONLY RT dated 4/18/2018 1:59 PM     INDICATION: Right hip pain. Postoperative evaluation.    COMPARISON: Pelvic radiograph dated 4/15/2018and dating back to 3/29/2018    TECHNIQUE: CT imaging of the right hip was performed. The data was   reformatted in the axial, coronal, and sagittal planes. Additionally, 3-D   reformatted imaging was created at a separate workstation.    FINDINGS:    OSSEOUS STRUCTURES: The patient is status post right hip   hemiarthroplasty. The hip is located within the acetabulum. No   periprosthetic fracture is identified. There is no subsidence. The pubic   symphysis is preserved. Mild spurring at the rightsacroiliac joint.  SYNOVIUM/ JOINT FLUID: There is a large fluid surrounding the right hip   arthroplasty decompressing posteriorly and tracking along the greater   trochanter inferiorly along the lateral thigh. Some of this fluid   demonstrates increased density suggestive of hemorrhagic components.  TENDONS: The proximal hamstring tendon is intact. Evaluation of the   iliopsoas is limited by beam hardening artifact but is intact.  MUSCLES: There is enlargement and hyperdensity within the gluteus medius   muscle suggesting intramuscular hematoma. There is attenuation of the   performance muscle. There is fluid tracking along the subcutaneous   fascial plane of the vastus lateralis muscle.  NEUROVASCULAR STRUCTURES: Moderate vascular calcifications are noted.  SUBCUTANEOUS SOFT TISSUES: There is mild to moderate right hip soft   tissue swelling with skin thickening. Surgical clips are seen overlying   the right hip in keeping with recent surgery.  INTRAPELVIC SOFT TISSUES: Small fat-containing inguinal hernia.    3-D reformatted imaging confirms these findings.    IMPRESSION:    1.  Status post right hip hemiarthroplasty with large complex fluid   collection about the right hip arthroplasty and decompressing on the   right lateral thigh. Findings are thought to represent an evolving   hematoma. Superimposed infection is not excluded.  2.  Intramuscular hematoma within the right gluteus medius muscle.   3.  Subfascial fluid tracking along the vastus lateralis, likely evolving   hematoma. Superimposed infection is not excluded.      EXAM:  US KIDNEY(S)                            PROCEDURE DATE:  04/17/2018          INTERPRETATION:  CLINICAL STATEMENT: A KI    TECHNIQUE: Ultrasound of the kidneys.    COMPARISON: None.    FINDINGS:  The right kidney measures 10.4 x 4.4 x 4.6 cm.    The left kidney measures 9.5 x 5.3 x 3.8 cm.    There is no hydronephrosis. Small bilateral renal cysts are noted   measuring up to 1.4 cm on the left kidney    Echogenic kidneys noted which may represent medical renal disease    IMPRESSION:  No hydronephrosis.

## 2018-04-19 NOTE — PROGRESS NOTE ADULT - PROBLEM SELECTOR PLAN 6
RISK                                                          Points  [] Previous VTE                                           3  [] Thrombophilia                                        2  [] Lower limb paralysis                              2   [] Current Cancer                                       2   [x] Immobilization > 24 hrs                        1  [x] ICU/CCU stay > 24 hours                       1  [x] Age > 60                                                   1    heparin sc Score 3 points
RISK                                                          Points  [] Previous VTE                                           3  [] Thrombophilia                                        2  [] Lower limb paralysis                              2   [] Current Cancer                                       2   [x] Immobilization > 24 hrs                        1  [x] ICU/CCU stay > 24 hours                       1  [x] Age > 60                                                   1    heparin sc Score 3 points

## 2018-04-19 NOTE — PROGRESS NOTE ADULT - SUBJECTIVE AND OBJECTIVE BOX
Orthopedics:    dx: S/p right hip hemiarthroplasty    Ortho called to d/c staples today. Pt seen and evaluated.  No acute events. Pt denies Chest pain, SOB, dyspnea, paresthesias, N/V/D, abdominal pain, syncope, or pain anywhere else.     Vital Signs Last 24 Hrs  T(C): 36.8 (19 Apr 2018 05:12), Max: 36.8 (19 Apr 2018 05:12)  T(F): 98.3 (19 Apr 2018 05:12), Max: 98.3 (19 Apr 2018 05:12)  HR: 79 (19 Apr 2018 12:05) (68 - 79)  BP: 134/56 (19 Apr 2018 12:05) (116/58 - 138/70)  BP(mean): --  RR: 18 (19 Apr 2018 05:12) (16 - 18)  SpO2: 95% (19 Apr 2018 12:05) (95% - 100%)    PE:   Rt hip: Skin is pink and warm. No erythema. Staples intact. Wound healed well. No drainage. SILT. Normal alignment.   Lower ext: No ct, calves soft, 2+ pulses. NVI SILT.    A/P: Male s/p Rt hip hemiarthroplasty  - Pain control  - PT: WBAT of RLE. Posterior hip precautions  - DVT ppx with venodynes  - Ju were d/c'd today with no acute events. Pt tolerated well. No complaints. Hip is stable.  - Follow up with Dr Nguyễn as outpt in 2 weeks at [phone]  - orthopedically stable for discharge  - case d/w dr nguyễn

## 2018-04-19 NOTE — PROGRESS NOTE ADULT - ASSESSMENT
89 yr old male from home, lives with wife, no home attendant, PMH CAD s/p CABG (12-13 years ago), s/p left hip pinning for left hip fracture (8 years ago), DM, HTN, BPH with urinary retention p/w worsening shortness of breath and altered mental status due to pneumonia.  1.ABX.  2.CAD-cont asa,b blocker,statin.  3.DM-Insulin.  4.CRI-renal f/u.  5.BPH-Flomax.  6.GI and DVT prophylaxis.

## 2018-04-19 NOTE — PROGRESS NOTE ADULT - ASSESSMENT
A/P:    1) LOBITO on CKD(stage 4 ?), pts egfr is stable over last 24 hours.  off diuretics   likely  pre-renal, with underlying ckd from diabetes and hypertensive disease  low albumin 2.1  -advise to hold lasix 2/2 LOBITO  -pro bnp 30,000 and cefepime and vanco for ?hip infection  -monitor vanc trough  -holding iv fluids  urine output 1.5L/24 hours  Fena 20- post obstructive likely 2/2 BPH  c/w BPH meds  Cr mildly worsened from yesterday 3.31--> 3.48  renal sono neg for obstruction   baseline Cr ~3  -Avoid Nephrotoxic Meds/ Agents such as (NSAIDs, IV contrast, Aminoglycosides such as gentamicin, -Gadolinium contrast, Phosphate containing enemas, etc..)  -Adjust Medications according to eGFR  -f/u bmp daily  -f/u I/O s daily  -elevated PTH 2/2 renal disease    2) HTN  -on atenolol and imdur  monitor BP  -keep bp<140/80  -add low salt diet    3) ANEMIA:MF  -F/u Hb daily  -c/w epogen   IV venofer x 5 days ordered, after which can start on po supplementation  CT hip shows possible developing hematoma, monitor H&H    4)HYPONATREMIA:mild, mf like ckd/hypervolemia  Na 141 --> 134  hold diuretics due to worsening renal function  -f/u Na level  daily    5) Hyperphosphatemia, resolved  phosp level normal today  on phoslo 667 2 tab TID  monitor phosph level in AM    6) METABOLIC ACIDOSIS, resolved  please get ABG in AM if worsening acidosis  on phoslo A/P:    1) LOBITO on CKD(stage 4 ?), pts egfr is stable over last 24 hours.  likely  pre-renal, with underlying ckd from diabetes and hypertensive disease  low albumin 2.1  -pro bnp 30,000 and cefepime and vanco for ?hip infection  -monitor vanc trough  -holding iv fluids  urine output 1.5L/24 hours  Fena 20- post obstructive likely 2/2 BPH  c/w BPH meds  Cr mildly worsened from yesterday 3.31--> 3.48  renal sono neg for obstruction   baseline Cr ~3  -Avoid Nephrotoxic Meds/ Agents such as (NSAIDs, IV contrast, Aminoglycosides such as gentamicin, -Gadolinium contrast, Phosphate containing enemas, etc..)  -Adjust Medications according to eGFR  -f/u bmp daily  -f/u I/O s daily  -elevated PTH 2/2 renal disease  -family interested in HD if needed, advised to see vascular as outpatient for fistula placement  -can restart po lasix 40mg every other day    2) HTN  -on atenolol and imdur  monitor BP  -keep bp<140/80  -add low salt diet    3) ANEMIA:MF  -F/u Hb daily  -c/w epogen   IV venofer x 5 days ordered, after which can start on po supplementation  CT hip shows possible developing hematoma, monitor H&H    4)HYPONATREMIA:mild, mf like ckd/hypervolemia  Na 141 --> 134  hold diuretics due to worsening renal function  -f/u Na level  daily    5) Hyperphosphatemia, resolved  phosp level normal today  reduced phoslo to 667 1 tab TID  monitor phosph level in AM    6) METABOLIC ACIDOSIS, resolved  please get ABG in AM if worsening acidosis  reduced phoslo dose

## 2018-04-19 NOTE — PROGRESS NOTE ADULT - SUBJECTIVE AND OBJECTIVE BOX
CHIEF COMPLAINT:Patient is a 89y old  Male who presents with a chief complaint of resp distress. Pt appears comfortable.    	  REVIEW OF SYSTEMS:  CONSTITUTIONAL: No fever, weight loss, or fatigue  EYES: No eye pain, visual disturbances, or discharge  ENT:  No difficulty hearing, tinnitus, vertigo; No sinus or throat pain  NECK: No pain or stiffness  RESPIRATORY: No cough, wheezing, chills or hemoptysis; No Shortness of Breath  CARDIOVASCULAR: No chest pain, palpitations, passing out, dizziness, or leg swelling  GASTROINTESTINAL: No abdominal or epigastric pain. No nausea, vomiting, or hematemesis; No diarrhea or constipation. No melena or hematochezia.  GENITOURINARY: No dysuria, frequency, hematuria, or incontinence  NEUROLOGICAL: No headaches, memory loss, loss of strength, numbness, or tremors  SKIN: No itching, burning, rashes, or lesions   LYMPH Nodes: No enlarged glands  ENDOCRINE: No heat or cold intolerance; No hair loss  MUSCULOSKELETAL: No joint pain or swelling; No muscle, back, or extremity pain  PSYCHIATRIC: No depression, anxiety, mood swings, or difficulty sleeping  HEME/LYMPH: No easy bruising, or bleeding gums  ALLERGY AND IMMUNOLOGIC: No hives or eczema	      PHYSICAL EXAM:  T(C): 36.8 (04-19-18 @ 05:12), Max: 36.8 (04-19-18 @ 05:12)  HR: 78 (04-19-18 @ 05:12) (67 - 78)  BP: 129/58 (04-19-18 @ 05:12) (116/58 - 146/76)  RR: 18 (04-19-18 @ 05:12) (16 - 18)  SpO2: 95% (04-19-18 @ 05:12) (95% - 100%)  Wt(kg): --  I&O's Summary    19 Apr 2018 07:01  -  19 Apr 2018 09:43  --------------------------------------------------------  IN: 0 mL / OUT: 250 mL / NET: -250 mL        Appearance: Normal	  HEENT:   Normal oral mucosa, PERRL, EOMI	  Lymphatic: No lymphadenopathy  Cardiovascular: Normal S1 S2, No JVD, No murmurs, No edema  Respiratory: B/L [facility]  Psychiatry: A & O x 3, Mood & affect appropriate  Gastrointestinal:  Soft, Non-tender, + BS	  Skin: No rashes, No ecchymoses, No cyanosis	  Neurologic: Non-focal  Extremities: Normal range of motion, No clubbing, cyanosis or edema  Vascular: Peripheral pulses palpable 2+ bilaterally    MEDICATIONS  (STANDING):  aspirin  chewable 81 milliGRAM(s) Oral daily  ATENolol  Tablet 25 milliGRAM(s) Oral daily  atorvastatin 40 milliGRAM(s) Oral at bedtime  calcium acetate 1334 milliGRAM(s) Oral three times a day with meals  cefepime  IVPB 1000 milliGRAM(s) IV Intermittent every 24 hours  cefepime  IVPB      citalopram 20 milliGRAM(s) Oral daily  clopidogrel Tablet 75 milliGRAM(s) Oral daily  collagenase Ointment 1 Application(s) Topical daily  epoetin vivian Injectable 6000 Unit(s) SubCutaneous <User Schedule>  finasteride 5 milliGRAM(s) Oral daily  heparin  Injectable 5000 Unit(s) SubCutaneous every 8 hours  insulin glargine Injectable (LANTUS) 10 Unit(s) SubCutaneous every morning  insulin lispro (HumaLOG) corrective regimen sliding scale   SubCutaneous three times a day before meals  iron sucrose IVPB 100 milliGRAM(s) IV Intermittent every 24 hours  isosorbide   mononitrate ER Tablet (IMDUR) 30 milliGRAM(s) Oral daily  pantoprazole  Injectable 40 milliGRAM(s) IV Push daily  tamsulosin 0.4 milliGRAM(s) Oral at bedtime  vancomycin  IVPB 500 milliGRAM(s) IV Intermittent every 24 hours      	  LABS:	 	                        7.5    5.6   )-----------( 186      ( 19 Apr 2018 06:48 )             21.2     04-19    134<L>  |  97  |  40<H>  ----------------------------<  225<H>  4.1   |  22  |  3.48<H>    Ca    7.7<L>      19 Apr 2018 06:48  Phos  3.3     04-19  Mg     2.0     04-19    TPro  5.8<L>  /  Alb  2.1<L>  /  TBili  1.0  /  DBili  x   /  AST  8<L>  /  ALT  7<L>  /  AlkPhos  71  04-18    proBNP: Serum Pro-Brain Natriuretic Peptide: 75464 pg/mL (04-16 @ 04:50)    Lipid Profile: Cholesterol 120  LDL 39  HDL 55      HgA1c: Hemoglobin A1C, Whole Blood: 5.6 % (04-02 @ 10:20)    TSH: Thyroid Stimulating Hormone, Serum: 2.77 uU/mL (03-30 @ 07:26)

## 2018-04-20 ENCOUNTER — TRANSCRIPTION ENCOUNTER (OUTPATIENT)
Age: 83
End: 2018-04-20

## 2018-04-20 VITALS
RESPIRATION RATE: 17 BRPM | OXYGEN SATURATION: 98 % | HEART RATE: 77 BPM | SYSTOLIC BLOOD PRESSURE: 135 MMHG | DIASTOLIC BLOOD PRESSURE: 58 MMHG | TEMPERATURE: 98 F

## 2018-04-20 LAB
ANION GAP SERPL CALC-SCNC: 10 MMOL/L — SIGNIFICANT CHANGE UP (ref 5–17)
BUN SERPL-MCNC: 38 MG/DL — HIGH (ref 7–18)
CALCIUM SERPL-MCNC: 8.1 MG/DL — LOW (ref 8.4–10.5)
CHLORIDE SERPL-SCNC: 99 MMOL/L — SIGNIFICANT CHANGE UP (ref 96–108)
CO2 SERPL-SCNC: 24 MMOL/L — SIGNIFICANT CHANGE UP (ref 22–31)
CREAT SERPL-MCNC: 3.13 MG/DL — HIGH (ref 0.5–1.3)
CULTURE RESULTS: SIGNIFICANT CHANGE UP
CULTURE RESULTS: SIGNIFICANT CHANGE UP
GLUCOSE BLDC GLUCOMTR-MCNC: 224 MG/DL — HIGH (ref 70–99)
GLUCOSE BLDC GLUCOMTR-MCNC: 258 MG/DL — HIGH (ref 70–99)
GLUCOSE BLDC GLUCOMTR-MCNC: 314 MG/DL — HIGH (ref 70–99)
GLUCOSE SERPL-MCNC: 233 MG/DL — HIGH (ref 70–99)
HCT VFR BLD CALC: 25 % — LOW (ref 39–50)
HCT VFR BLD CALC: 25.5 % — LOW (ref 39–50)
HGB BLD-MCNC: 7.7 G/DL — LOW (ref 13–17)
HGB BLD-MCNC: 8.4 G/DL — LOW (ref 13–17)
MAGNESIUM SERPL-MCNC: 1.8 MG/DL — SIGNIFICANT CHANGE UP (ref 1.6–2.6)
MCHC RBC-ENTMCNC: 29 PG — SIGNIFICANT CHANGE UP (ref 27–34)
MCHC RBC-ENTMCNC: 29.7 PG — SIGNIFICANT CHANGE UP (ref 27–34)
MCHC RBC-ENTMCNC: 30.8 GM/DL — LOW (ref 32–36)
MCHC RBC-ENTMCNC: 33 GM/DL — SIGNIFICANT CHANGE UP (ref 32–36)
MCV RBC AUTO: 90.2 FL — SIGNIFICANT CHANGE UP (ref 80–100)
MCV RBC AUTO: 94.2 FL — SIGNIFICANT CHANGE UP (ref 80–100)
PHOSPHATE SERPL-MCNC: 2.9 MG/DL — SIGNIFICANT CHANGE UP (ref 2.5–4.5)
PLATELET # BLD AUTO: 176 K/UL — SIGNIFICANT CHANGE UP (ref 150–400)
PLATELET # BLD AUTO: 208 K/UL — SIGNIFICANT CHANGE UP (ref 150–400)
POTASSIUM SERPL-MCNC: 3.8 MMOL/L — SIGNIFICANT CHANGE UP (ref 3.5–5.3)
POTASSIUM SERPL-SCNC: 3.8 MMOL/L — SIGNIFICANT CHANGE UP (ref 3.5–5.3)
RBC # BLD: 2.66 M/UL — LOW (ref 4.2–5.8)
RBC # BLD: 2.82 M/UL — LOW (ref 4.2–5.8)
RBC # FLD: 13.4 % — SIGNIFICANT CHANGE UP (ref 10.3–14.5)
RBC # FLD: 13.5 % — SIGNIFICANT CHANGE UP (ref 10.3–14.5)
SODIUM SERPL-SCNC: 133 MMOL/L — LOW (ref 135–145)
SPECIMEN SOURCE: SIGNIFICANT CHANGE UP
SPECIMEN SOURCE: SIGNIFICANT CHANGE UP
WBC # BLD: 4.5 K/UL — SIGNIFICANT CHANGE UP (ref 3.8–10.5)
WBC # BLD: 5 K/UL — SIGNIFICANT CHANGE UP (ref 3.8–10.5)
WBC # FLD AUTO: 4.5 K/UL — SIGNIFICANT CHANGE UP (ref 3.8–10.5)
WBC # FLD AUTO: 5 K/UL — SIGNIFICANT CHANGE UP (ref 3.8–10.5)

## 2018-04-20 PROCEDURE — 82803 BLOOD GASES ANY COMBINATION: CPT

## 2018-04-20 PROCEDURE — 84300 ASSAY OF URINE SODIUM: CPT

## 2018-04-20 PROCEDURE — 84145 PROCALCITONIN (PCT): CPT

## 2018-04-20 PROCEDURE — 82310 ASSAY OF CALCIUM: CPT

## 2018-04-20 PROCEDURE — 97530 THERAPEUTIC ACTIVITIES: CPT

## 2018-04-20 PROCEDURE — 97110 THERAPEUTIC EXERCISES: CPT

## 2018-04-20 PROCEDURE — 71045 X-RAY EXAM CHEST 1 VIEW: CPT

## 2018-04-20 PROCEDURE — 83935 ASSAY OF URINE OSMOLALITY: CPT

## 2018-04-20 PROCEDURE — 80053 COMPREHEN METABOLIC PANEL: CPT

## 2018-04-20 PROCEDURE — 36415 COLL VENOUS BLD VENIPUNCTURE: CPT

## 2018-04-20 PROCEDURE — 94003 VENT MGMT INPAT SUBQ DAY: CPT

## 2018-04-20 PROCEDURE — 80202 ASSAY OF VANCOMYCIN: CPT

## 2018-04-20 PROCEDURE — 84484 ASSAY OF TROPONIN QUANT: CPT

## 2018-04-20 PROCEDURE — 82570 ASSAY OF URINE CREATININE: CPT

## 2018-04-20 PROCEDURE — 36430 TRANSFUSION BLD/BLD COMPNT: CPT

## 2018-04-20 PROCEDURE — 80048 BASIC METABOLIC PNL TOTAL CA: CPT

## 2018-04-20 PROCEDURE — 86850 RBC ANTIBODY SCREEN: CPT

## 2018-04-20 PROCEDURE — 87040 BLOOD CULTURE FOR BACTERIA: CPT

## 2018-04-20 PROCEDURE — 94002 VENT MGMT INPAT INIT DAY: CPT

## 2018-04-20 PROCEDURE — 86923 COMPATIBILITY TEST ELECTRIC: CPT

## 2018-04-20 PROCEDURE — 83735 ASSAY OF MAGNESIUM: CPT

## 2018-04-20 PROCEDURE — 92610 EVALUATE SWALLOWING FUNCTION: CPT

## 2018-04-20 PROCEDURE — 84100 ASSAY OF PHOSPHORUS: CPT

## 2018-04-20 PROCEDURE — 93005 ELECTROCARDIOGRAM TRACING: CPT

## 2018-04-20 PROCEDURE — 85027 COMPLETE CBC AUTOMATED: CPT

## 2018-04-20 PROCEDURE — 81001 URINALYSIS AUTO W/SCOPE: CPT

## 2018-04-20 PROCEDURE — 96374 THER/PROPH/DIAG INJ IV PUSH: CPT

## 2018-04-20 PROCEDURE — 87086 URINE CULTURE/COLONY COUNT: CPT

## 2018-04-20 PROCEDURE — 82550 ASSAY OF CK (CPK): CPT

## 2018-04-20 PROCEDURE — 99231 SBSQ HOSP IP/OBS SF/LOW 25: CPT

## 2018-04-20 PROCEDURE — 94640 AIRWAY INHALATION TREATMENT: CPT

## 2018-04-20 PROCEDURE — 73700 CT LOWER EXTREMITY W/O DYE: CPT

## 2018-04-20 PROCEDURE — 97162 PT EVAL MOD COMPLEX 30 MIN: CPT

## 2018-04-20 PROCEDURE — 99291 CRITICAL CARE FIRST HOUR: CPT | Mod: 25

## 2018-04-20 PROCEDURE — 70450 CT HEAD/BRAIN W/O DYE: CPT

## 2018-04-20 PROCEDURE — P9040: CPT

## 2018-04-20 PROCEDURE — 76775 US EXAM ABDO BACK WALL LIM: CPT

## 2018-04-20 PROCEDURE — 82962 GLUCOSE BLOOD TEST: CPT

## 2018-04-20 PROCEDURE — 84156 ASSAY OF PROTEIN URINE: CPT

## 2018-04-20 PROCEDURE — 86901 BLOOD TYPING SEROLOGIC RH(D): CPT

## 2018-04-20 PROCEDURE — 82553 CREATINE MB FRACTION: CPT

## 2018-04-20 PROCEDURE — 97116 GAIT TRAINING THERAPY: CPT

## 2018-04-20 PROCEDURE — 83880 ASSAY OF NATRIURETIC PEPTIDE: CPT

## 2018-04-20 PROCEDURE — 72170 X-RAY EXAM OF PELVIS: CPT

## 2018-04-20 PROCEDURE — 86900 BLOOD TYPING SEROLOGIC ABO: CPT

## 2018-04-20 PROCEDURE — 83970 ASSAY OF PARATHORMONE: CPT

## 2018-04-20 RX ORDER — FUROSEMIDE 40 MG
1 TABLET ORAL
Qty: 13 | Refills: 0
Start: 2018-04-20 | End: 2018-05-19

## 2018-04-20 RX ORDER — INSULIN LISPRO 100/ML
3 VIAL (ML) SUBCUTANEOUS
Qty: 0 | Refills: 0 | Status: DISCONTINUED | OUTPATIENT
Start: 2018-04-20 | End: 2018-04-20

## 2018-04-20 RX ORDER — CALCIUM ACETATE 667 MG
1 TABLET ORAL
Qty: 45 | Refills: 0
Start: 2018-04-20 | End: 2018-05-04

## 2018-04-20 RX ORDER — FUROSEMIDE 40 MG
1 TABLET ORAL
Qty: 30 | Refills: 0 | OUTPATIENT
Start: 2018-04-20 | End: 2018-05-19

## 2018-04-20 RX ORDER — ATENOLOL 25 MG/1
1 TABLET ORAL
Qty: 0 | Refills: 0 | DISCHARGE
Start: 2018-04-20

## 2018-04-20 RX ORDER — INSULIN GLARGINE 100 [IU]/ML
12 INJECTION, SOLUTION SUBCUTANEOUS EVERY MORNING
Qty: 0 | Refills: 0 | Status: DISCONTINUED | OUTPATIENT
Start: 2018-04-20 | End: 2018-04-20

## 2018-04-20 RX ORDER — CEFUROXIME AXETIL 250 MG
1 TABLET ORAL
Qty: 10 | Refills: 0
Start: 2018-04-20 | End: 2018-04-24

## 2018-04-20 RX ORDER — POLYETHYLENE GLYCOL 3350 17 G/17G
17 POWDER, FOR SOLUTION ORAL
Qty: 1000 | Refills: 0
Start: 2018-04-20 | End: 2018-04-26

## 2018-04-20 RX ADMIN — HEPARIN SODIUM 5000 UNIT(S): 5000 INJECTION INTRAVENOUS; SUBCUTANEOUS at 06:05

## 2018-04-20 RX ADMIN — Medication 667 MILLIGRAM(S): at 13:11

## 2018-04-20 RX ADMIN — ERYTHROPOIETIN 6000 UNIT(S): 10000 INJECTION, SOLUTION INTRAVENOUS; SUBCUTANEOUS at 13:11

## 2018-04-20 RX ADMIN — Medication 4: at 17:07

## 2018-04-20 RX ADMIN — Medication 81 MILLIGRAM(S): at 13:10

## 2018-04-20 RX ADMIN — Medication 3 MILLILITER(S): at 15:20

## 2018-04-20 RX ADMIN — Medication 3 MILLILITER(S): at 03:45

## 2018-04-20 RX ADMIN — ATENOLOL 25 MILLIGRAM(S): 25 TABLET ORAL at 06:05

## 2018-04-20 RX ADMIN — Medication 3: at 08:26

## 2018-04-20 RX ADMIN — Medication 1 APPLICATION(S): at 13:09

## 2018-04-20 RX ADMIN — INSULIN GLARGINE 10 UNIT(S): 100 INJECTION, SOLUTION SUBCUTANEOUS at 08:26

## 2018-04-20 RX ADMIN — CITALOPRAM 20 MILLIGRAM(S): 10 TABLET, FILM COATED ORAL at 13:10

## 2018-04-20 RX ADMIN — FINASTERIDE 5 MILLIGRAM(S): 5 TABLET, FILM COATED ORAL at 13:10

## 2018-04-20 RX ADMIN — HYDROMORPHONE HYDROCHLORIDE 1 MILLIGRAM(S): 2 INJECTION INTRAMUSCULAR; INTRAVENOUS; SUBCUTANEOUS at 11:30

## 2018-04-20 RX ADMIN — PANTOPRAZOLE SODIUM 40 MILLIGRAM(S): 20 TABLET, DELAYED RELEASE ORAL at 13:10

## 2018-04-20 RX ADMIN — CEFEPIME 100 MILLIGRAM(S): 1 INJECTION, POWDER, FOR SOLUTION INTRAMUSCULAR; INTRAVENOUS at 06:05

## 2018-04-20 RX ADMIN — HYDROMORPHONE HYDROCHLORIDE 1 MILLIGRAM(S): 2 INJECTION INTRAMUSCULAR; INTRAVENOUS; SUBCUTANEOUS at 10:46

## 2018-04-20 RX ADMIN — CLOPIDOGREL BISULFATE 75 MILLIGRAM(S): 75 TABLET, FILM COATED ORAL at 13:11

## 2018-04-20 RX ADMIN — ISOSORBIDE MONONITRATE 30 MILLIGRAM(S): 60 TABLET, EXTENDED RELEASE ORAL at 13:10

## 2018-04-20 RX ADMIN — Medication 667 MILLIGRAM(S): at 17:40

## 2018-04-20 RX ADMIN — Medication 3 MILLILITER(S): at 09:47

## 2018-04-20 RX ADMIN — Medication 2: at 12:07

## 2018-04-20 RX ADMIN — IRON SUCROSE 210 MILLIGRAM(S): 20 INJECTION, SOLUTION INTRAVENOUS at 15:20

## 2018-04-20 RX ADMIN — Medication 100 MILLIGRAM(S): at 06:06

## 2018-04-20 RX ADMIN — HEPARIN SODIUM 5000 UNIT(S): 5000 INJECTION INTRAVENOUS; SUBCUTANEOUS at 13:10

## 2018-04-20 RX ADMIN — Medication 667 MILLIGRAM(S): at 08:30

## 2018-04-20 NOTE — PROGRESS NOTE ADULT - SUBJECTIVE AND OBJECTIVE BOX
HPI:  89yo male from home, lives with wife, no home attendant, PMH CAD s/p CABG (12-13 years ago), s/p left hip pinning for left hip fracture (8 years ago), DM, HTN, BPH with urinary retention p/w worsening shortness of breath and altered mental status. History and ROS obtained from son (Esteban [phone], at bedside).  Patient was recently admitted to [facility] from 3/29 to 4/6/2018 for right hip fracture after mechanical fall.  Patient underwent right hip arthroplasty on 3/30 with Dr Sosa, complicated by ileus and E coli UTI, and discharged on 4/6 to home.  Patient has been receiving home PT.  2 days ago, while walking with therapist, patient endorsed shortness of breath and wheezing, resolved after rest.  The next day, son called patient's wife and was told he was doing well.  This early morning at 1am, grandson noticed that patient in distress with worsening sob and was disoriented.  911 called.  Son states that patient did not endorse fever, headache, change in vision or hearing, sore throat, chest pain, vomiting, diarrhea, hematochezia or hematuria.  Last colonoscopy 7 years ago. Endorses occasional nausea and 'gagging' with food.     3/29 TTE: grossly nl LV function    In ED, T 98.6 P 81 /80 RR 16    Patient was in CT scanner for head CT when patient became more short of breath and VBG returned [id number]  patient was intubated     CXR: ? RLL infiltrate vs effusion, prominent bilateral hilum  EKG: SR @ 81bpm, right axis deviation, RBBB, no change from previous EKG  T1 0.121, BNP (15 Apr 2018 05:37)      Meds:  cefepime  IVPB 1000 milliGRAM(s) IV Intermittent every 24 hours  cefepime  IVPB      vancomycin  IVPB 500 milliGRAM(s) IV Intermittent every 24 hours    Allergies:  Allergies    No Known Allergies    Intolerances        REVIEW OF SYSTEMS:preet diet  no vomiting  no abd pain    CONSTITUTIONAL: No weakness, fevers or chills  EYES/ENT: No visual changes;  No vertigo or throat pain   NECK: No pain or stiffness  RESPIRATORY: No cough, wheezing, hemoptysis; No shortness of breath  CARDIOVASCULAR: No chest pain or palpitations  GASTROINTESTINAL: No abdominal or epigastric pain. No nausea, vomiting, or hematemesis; No diarrhea or constipation. No melena or hematochezia.  GENITOURINARY: No dysuria, frequency or hematuria  NEUROLOGICAL: No numbness or weakness  SKIN: No itching, burning, rashes, or lesions   All other review of systems is negative unless indicated above.    PHYSICAL EXAM:    Vital Signs Last 24 Hrs  T(C): 36.8 (20 Apr 2018 16:20), Max: 36.8 (20 Apr 2018 16:20)  T(F): 98.2 (20 Apr 2018 16:20), Max: 98.2 (20 Apr 2018 16:20)  HR: 77 (20 Apr 2018 16:20) (67 - 85)  BP: 135/58 (20 Apr 2018 16:20) (123/56 - 144/60)  BP(mean): --  RR: 17 (20 Apr 2018 16:20) (16 - 18)  SpO2: 98% (20 Apr 2018 16:20) (95% - 100%)    HEENT:elderly frail male  awake  lungs ae fair  cardia ierreg  abd soft  neuro deferred    Neck:  [  ] Supple  [  ] Lymphadenopathy  [  ] JVD  [  ] Masses  [  ] WNL    CHEST/Respiratory: [ ] Clear to auscultation     [  ] Rales      [  ] Rhonchi      [  ] Wheezing       [  ] Chest Tenderness     [  ] WNL    Cardiovascular:  [  ]S1 S2  [  ] Reg  [  ] Irreg   [  ] No Murmurs   [  ] Murmurs  [  ] Systolic [  ] Diastolic    Gastrointestinal:  [  ] Bowel Sounds  [   ] ABD Soft  [  ] ABD Distention   [  ] No Tenderness [  ] Tenderness  [  ] Organomegaly  [  ] Guarding rigidity  [  ] No Guarding rigidity  [  ] Rebound Tenderness [  ] No Rebound Tenderness    Extremities: [  ] Edema  [  ] No Edema  [  ] Clubbing   [  ] Cyanosis  [  ] Palpable peripheral pulses                          [  ] Tender calf muscles    [  ] No Tender Calf Muscles    Neurological:  [  ] Alert  [  ] Awake  [  ] Oriented  x                              [  ] Focal weakness  [  ] No Focal Weakness    Skin:  [  ] Thrombophlebitis  [  ] Rashes  [  ] Dry  [  ] Ulcers    Ortho:  [  ] Joint Swelling  [  ] Joint erythema [  ] DJD [  ] Increased Temperature to Touch      LABS/DIAGNOSTIC TESTS                          8.4    4.5   )-----------( 176      ( 20 Apr 2018 17:27 )             25.5         04-20    133<L>  |  99  |  38<H>  ----------------------------<  233<H>  3.8   |  24  |  3.13<H>    Ca    8.1<L>      20 Apr 2018 08:13  Phos  2.9     04-20  Mg     1.8     04-20        CAPILLARY BLOOD GLUCOSE      POCT Blood Glucose.: 314 mg/dL (20 Apr 2018 17:03)  POCT Blood Glucose.: 224 mg/dL (20 Apr 2018 11:49)  POCT Blood Glucose.: 258 mg/dL (20 Apr 2018 08:15)  POCT Blood Glucose.: 198 mg/dL (19 Apr 2018 22:12)        Hemoglobin A1C, Whole Blood: 5.6 % (04-02 @ 10:20)    Thyroid Stimulating Hormone, Serum: 2.77 uU/mL (03-30 @ 07:26)    CULTURES:       RADIOLOGY  CXR:    ALBUTerol/ipratropium for Nebulization 3 milliLiter(s) Nebulizer every 6 hours  aspirin  chewable 81 milliGRAM(s) Oral daily  ATENolol  Tablet 25 milliGRAM(s) Oral daily  atorvastatin 40 milliGRAM(s) Oral at bedtime  calcium acetate 667 milliGRAM(s) Oral three times a day with meals  cefepime  IVPB 1000 milliGRAM(s) IV Intermittent every 24 hours  cefepime  IVPB      citalopram 20 milliGRAM(s) Oral daily  clopidogrel Tablet 75 milliGRAM(s) Oral daily  collagenase Ointment 1 Application(s) Topical daily  epoetin vivian Injectable 6000 Unit(s) SubCutaneous <User Schedule>  finasteride 5 milliGRAM(s) Oral daily  furosemide    Tablet 40 milliGRAM(s) Oral <User Schedule>  heparin  Injectable 5000 Unit(s) SubCutaneous every 8 hours  HYDROmorphone  Injectable 1 milliGRAM(s) IV Push every 4 hours PRN  insulin glargine Injectable (LANTUS) 10 Unit(s) SubCutaneous every morning  insulin lispro (HumaLOG) corrective regimen sliding scale   SubCutaneous three times a day before meals  iron sucrose IVPB 100 milliGRAM(s) IV Intermittent every 24 hours  isosorbide   mononitrate ER Tablet (IMDUR) 30 milliGRAM(s) Oral daily  pantoprazole  Injectable 40 milliGRAM(s) IV Push daily  tamsulosin 0.4 milliGRAM(s) Oral at bedtime  vancomycin  IVPB 500 milliGRAM(s) IV Intermittent every 24 hours  zolpidem 10 milliGRAM(s) Oral at bedtime PRN

## 2018-04-20 NOTE — PROGRESS NOTE ADULT - SUBJECTIVE AND OBJECTIVE BOX
Patient is a 89y old  Male who presents with a chief complaint of resp distress (20 Apr 2018 07:32)      INTERVAL HPI/OVERNIGHT EVENTS:  no complaints    VITAL SIGNS:  T(F): 97.2 (04-20-18 @ 06:00)  HR: 74 (04-20-18 @ 06:00)  BP: 144/60 (04-20-18 @ 06:00)  RR: 18 (04-20-18 @ 06:00)  SpO2: 100% (04-20-18 @ 06:00)  Wt(kg): --  I&O's Detail    19 Apr 2018 07:01  -  20 Apr 2018 07:00  --------------------------------------------------------  IN:  Total IN: 0 mL    OUT:    Voided: 250 mL  Total OUT: 250 mL    Total NET: -250 mL              REVIEW OF SYSTEMS:    CONSTITUTIONAL:  No fevers, chills, sweats    HEENT:  Eyes:  No diplopia or blurred vision. ENT:  No earache, sore throat or runny nose.    CARDIOVASCULAR:  No pressure, squeezing, tightness, or heaviness about the chest; no palpitations.    RESPIRATORY:  no sob    GASTROINTESTINAL:  No abdominal pain, nausea, vomiting or diarrhea.    GENITOURINARY:  No dysuria, frequency or urgency.    NEUROLOGIC:  No paresthesias, fasciculations, seizures or weakness.    PSYCHIATRIC:  No disorder of thought or mood.      PHYSICAL EXAM:    Constitutional:no complaints  ENMT:atnc  Neck:supple  Respiratory:clear  Cardiovascular:rr  Gastrointestinal:soft  Extremities:no edema  Vascular:intact  Neurological:non focal  Musculoskeletal:no edema, normal rom    MEDICATIONS  (STANDING):  ALBUTerol/ipratropium for Nebulization 3 milliLiter(s) Nebulizer every 6 hours  aspirin  chewable 81 milliGRAM(s) Oral daily  ATENolol  Tablet 25 milliGRAM(s) Oral daily  atorvastatin 40 milliGRAM(s) Oral at bedtime  calcium acetate 667 milliGRAM(s) Oral three times a day with meals  cefepime  IVPB 1000 milliGRAM(s) IV Intermittent every 24 hours  cefepime  IVPB      citalopram 20 milliGRAM(s) Oral daily  clopidogrel Tablet 75 milliGRAM(s) Oral daily  collagenase Ointment 1 Application(s) Topical daily  epoetin vivian Injectable 6000 Unit(s) SubCutaneous <User Schedule>  finasteride 5 milliGRAM(s) Oral daily  furosemide    Tablet 40 milliGRAM(s) Oral <User Schedule>  heparin  Injectable 5000 Unit(s) SubCutaneous every 8 hours  insulin glargine Injectable (LANTUS) 10 Unit(s) SubCutaneous every morning  insulin lispro (HumaLOG) corrective regimen sliding scale   SubCutaneous three times a day before meals  iron sucrose IVPB 100 milliGRAM(s) IV Intermittent every 24 hours  isosorbide   mononitrate ER Tablet (IMDUR) 30 milliGRAM(s) Oral daily  pantoprazole  Injectable 40 milliGRAM(s) IV Push daily  tamsulosin 0.4 milliGRAM(s) Oral at bedtime  vancomycin  IVPB 500 milliGRAM(s) IV Intermittent every 24 hours    MEDICATIONS  (PRN):  HYDROmorphone  Injectable 1 milliGRAM(s) IV Push every 4 hours PRN Severe Pain (7 - 10)  zolpidem 10 milliGRAM(s) Oral at bedtime PRN Insomnia      Allergies    No Known Allergies    Intolerances        LABS:                        7.7    5.0   )-----------( 208      ( 20 Apr 2018 08:13 )             25.0     04-20    133<L>  |  99  |  38<H>  ----------------------------<  233<H>  3.8   |  24  |  3.13<H>    Ca    8.1<L>      20 Apr 2018 08:13  Phos  2.9     04-20  Mg     1.8     04-20                CAPILLARY BLOOD GLUCOSE      POCT Blood Glucose.: 258 mg/dL (20 Apr 2018 08:15)  POCT Blood Glucose.: 198 mg/dL (19 Apr 2018 22:12)  POCT Blood Glucose.: 210 mg/dL (19 Apr 2018 16:27)  POCT Blood Glucose.: 294 mg/dL (19 Apr 2018 12:09)    pro-bnp [id number] 04-16 @ 04:50     d

## 2018-04-20 NOTE — PROGRESS NOTE ADULT - ASSESSMENT
-S/P RESPIRATORY FAILURE 2/2 PN(HCAP)/CHF-IMPROVED  - NO WOUND INFECTION AS PER ORTHO  -S/P POSTERIOR DISLOCATION OF HIP-REDUCED AND IN POSITION ON CURRENT CT  -ACUTE ON CKD-HOLDING LASIX PER RENAL  -ANEMIA-MULTIFACTORIAL  -HX; CAD/CABG,DM,HTN,HLD,BPH,S/P ORIF RT HIP 3/30/18    PLAN; ANTIBX-WILL BRIDGE TO CEFTIN/DOXY UNTIL 4/25 PER ID UPON DISCHARGE           DVT PPX           ID/ORTHO/RENAL F/U           PHY TX           EPO   STABLE FOR DISCHARGE TODAY WITH HOME PT AND ORTHO/MEDICAL F/U AS OUPT

## 2018-04-20 NOTE — DISCHARGE NOTE ADULT - MEDICATION SUMMARY - MEDICATIONS TO STOP TAKING
I will STOP taking the medications listed below when I get home from the hospital:    probiotic supplements  -- 1 day(s)  every 12 hours with antibiotics    Ultram 50 mg oral tablet  -- 1 tab(s) by mouth every 6 hours, As Needed -for moderate pain MDD:4  -- Caution federal law prohibits the transfer of this drug to any person other  than the person for whom it was prescribed.  May cause drowsiness.  Alcohol may intensify this effect.  Use care when operating dangerous machinery.  Obtain medical advice before taking any non-prescription drugs as some may affect the action of this medication.

## 2018-04-20 NOTE — DISCHARGE NOTE ADULT - CARE PROVIDER_API CALL
Conrad Rodriguez), Nephrology  [address] [place] [address]  Phone: [phone]  Fax: [phone]    Sima Johnson), Internal Medicine  [address] [address]  Phone: [phone]  Fax: [phone]    Mac Sosa), Orthopaedic Surgery  [address] [place] [address]  Phone: [phone]  Fax: [phone]

## 2018-04-20 NOTE — PROGRESS NOTE ADULT - SUBJECTIVE AND OBJECTIVE BOX
Meds:  cefepime  IVPB 1000 milliGRAM(s) IV Intermittent every 24 hours  Vancomycin 500 mg IVPB q day.    Allergies:  Allergies    No Known Allergies    Intolerances        ROS  [  ] UNABLE TO ELICIT    General:  [  ] None  [  ] Fever  [  ] Chills  [ x ] Malaise    Skin:  [ x ] None [  ] Rash  [  ] Wound  [  ] Ulcer    HEENT:  [ x ] None  [  ] Sore Throat  [  ] Nasal congestion/ runny nose  [  ] Photophobia [  ] Neck pain      Chest:  [  ] None   [  ] SOB  [ x ] Cough  [  ] None    Cardiovascular:   [ x ] None  [  ] CP  [  ] Palpitation    Gastrointestinal:  [ x ] None  [  ] Abd pain   [  ] Nausea    [  ] Vomiting   [  ] Diarrhea	     Genitourinary:  [ x ] None [  ] Polyuria   [  ] Urgency  [  ] Frequency  [  ] Dysuria    [  ]  Hematuria       Musculoskeletal:  [  ] None [  ] Back Pain	[  ] Body aches  [  ] Joint pain [ x ] Weakness    Neurological: [  ] None [  ]Dizziness  [  ]Visual Disturbance  [  ]Headaches   [ x ] Weakness          PHYSICAL EXAM:  Vital Signs Last 24 Hrs  T(C): 36.2 (20 Apr 2018 06:00), Max: 36.6 (19 Apr 2018 14:55)  T(F): 97.2 (20 Apr 2018 06:00), Max: 97.9 (19 Apr 2018 14:55)  HR: 74 (20 Apr 2018 06:00) (73 - 85)  BP: 144/60 (20 Apr 2018 06:00) (128/58 - 144/60)  BP(mean): --  RR: 18 (20 Apr 2018 06:00) (16 - 18)  SpO2: 100% (20 Apr 2018 06:00) (95% - 100%)    Constitutional:    HEENT: [ x ] Wnl  [  ] Pharyngeal congestion [ x ] Extubated.    Neck:  [ x ] Supple  [  ]Lymphadenopathy  [ x ] No JVD   [  ] JVD  [  ] Masses   [  ] WNL    CHEST/Respiratory:  [  ]Clear to auscultation  [ x ] Rales   [  ] Rhonchi   [  ] Wheezing     [  ] Chest Tenderness      Cardiovascular:  [ x ] Reg S1 S2   [  ] Irreg S1 S2   [ x ]No Murmur  [  ] +ve Murmurs  [  ]Systolic [  ]Diastolic      Abdomen:  [ x ] Soft  [ x ] No tendrerness  [  ] Tenderness  [  ] Organomegaly  [  ] ABD Distention  [  ] Rigidity                       [ x ] No Regidity                       [ x ] No Rebound Tenderness  [  ] No Guarding Rigidity  [  ] Rebound Tenderness[  ] Guarding Rigidity                          [ x ]  +ve Bowel Sounds  [  ] Decreased Bowel Sounds    [  ] Absent Bowel Sounds                            Extremities: [  ] No edema [ x ] Edema  [  ] Clubbing   [  ] Cyanosis                         [ x ] No Tender Calf muscles  [  ] Tender Calf muscles                        [ x ] Palpable peripheral pulses    Neurological: [  ] Awake  [  ] Alert  [  ] Oriented  x                             [  ] Confused  [  ] Drowsy  [ x ] respond to painful stimuli  [  ] Unresponsive    Skin:  [  ] Intact [  ] Redness [  ] Thrombophlebitis  [  ] Rashes  [  ] Dry  [ x ] Right hip surgical dry wound, and staples in place    Ortho:  [  ] Joint Swelling  [  ] Joint erythema [  ] Joint tenderness                [  ] Increased temp. to touch  [  ] DJD [ x ] WNL          LABS/DIAGNOSTIC TESTS                        7.5    5.6   )-----------( 186      ( 19 Apr 2018 06:48 )             21.2   04-19    134<L>  |  97  |  40<H>  ----------------------------<  225<H>  4.1   |  22  |  3.48<H>    Ca    7.7<L>      19 Apr 2018 06:48  Phos  3.3     04-19  Mg     2.0     04-19    TPro  5.8<L>  /  Alb  2.1<L>  /  TBili  1.0  /  DBili  x   /  AST  8<L>  /  ALT  7<L>  /  AlkPhos  71  04-18     CAPILLARY BLOOD GLUCOSE      POCT Blood Glucose.: 198 mg/dL (19 Apr 2018 22:12)  POCT Blood Glucose.: 210 mg/dL (19 Apr 2018 16:27)  POCT Blood Glucose.: 294 mg/dL (19 Apr 2018 12:09)  POCT Blood Glucose.: 280 mg/dL (19 Apr 2018 08:03)      CULTURES:     Culture - Blood (04.15.18 @ 12:42)    Specimen Source: .Blood Blood    Culture Results:   No growth to date.    Culture - Blood (04.15.18 @ 12:41)    Specimen Source: .Blood Blood    Culture Results:   No growth to date.    Culture - Urine (04.15.18 @ 12:09)    Specimen Source: .Urine Clean Catch (Midstream)    Culture Results:   No growth          RADIOLOGY:    EXAM:  CT HIP ONLY RT                            PROCEDURE DATE:  04/18/2018          INTERPRETATION:  CT HIP ONLY RT dated 4/18/2018 1:59 PM     INDICATION: Right hip pain. Postoperative evaluation.    COMPARISON: Pelvic radiograph dated 4/15/2018and dating back to 3/29/2018    TECHNIQUE: CT imaging of the right hip was performed. The data was   reformatted in the axial, coronal, and sagittal planes. Additionally, 3-D   reformatted imaging was created at a separate workstation.    FINDINGS:    OSSEOUS STRUCTURES: The patient is status post right hip   hemiarthroplasty. The hip is located within the acetabulum. No   periprosthetic fracture is identified. There is no subsidence. The pubic   symphysis is preserved. Mild spurring at the rightsacroiliac joint.  SYNOVIUM/ JOINT FLUID: There is a large fluid surrounding the right hip   arthroplasty decompressing posteriorly and tracking along the greater   trochanter inferiorly along the lateral thigh. Some of this fluid   demonstrates increased density suggestive of hemorrhagic components.  TENDONS: The proximal hamstring tendon is intact. Evaluation of the   iliopsoas is limited by beam hardening artifact but is intact.  MUSCLES: There is enlargement and hyperdensity within the gluteus medius   muscle suggesting intramuscular hematoma. There is attenuation of the   performance muscle. There is fluid tracking along the subcutaneous   fascial plane of the vastus lateralis muscle.  NEUROVASCULAR STRUCTURES: Moderate vascular calcifications are noted.  SUBCUTANEOUS SOFT TISSUES: There is mild to moderate right hip soft   tissue swelling with skin thickening. Surgical clips are seen overlying   the right hip in keeping with recent surgery.  INTRAPELVIC SOFT TISSUES: Small fat-containing inguinal hernia.    3-D reformatted imaging confirms these findings.    IMPRESSION:    1.  Status post right hip hemiarthroplasty with large complex fluid   collection about the right hip arthroplasty and decompressing on the   right lateral thigh. Findings are thought to represent an evolving   hematoma. Superimposed infection is not excluded.  2.  Intramuscular hematoma within the right gluteus medius muscle.   3.  Subfascial fluid tracking along the vastus lateralis, likely evolving   hematoma. Superimposed infection is not excluded.          CXR:    EXAM:  XR CHEST AP OR PA 1V                            PROCEDURE DATE:  04/15/2018          INTERPRETATION:  PORTABLE CHEST X-RAY    HISTORY: s/p intubation, confirm placement.    COMPARISON: 4/15/2018.    FINDINGS/  IMPRESSION:  Interval intubation with ET tube tip approximately 3.3 cm above the   riaz. No pneumothorax.    Pulmonary vascular congestion, worsening bilateral perihilar airspace   opacities. Small bilateral pleural effusions are unchanged.    The cardiac silhouette is not accurately assessed by AP technique. Aortic   calcifications. Sternotomy wires.        EXAM:  CT BRAIN                            PROCEDURE DATE:  04/15/2018          INTERPRETATION:  CLINICAL INFORMATION: Altered mental status.    COMPARISON: None available.    PROCEDURE:   Noncontrast CT of the Head was performed from the skull base to the   vertex. Coronal and Sagittal reformats were obtained.    FINDINGS:    There is no acute intracranial hemorrhage, mass effect, midline shift,   extra-axial collection, hydrocephalus, or evidence of acute vascular   territorial infarction. Mild patchy hypodensities within the   periventricular and subcortical white matter, although nonspecific,   likely reflect chronic microvascular disease. Cerebral volume loss   results in prominence of the ventricles and sulci.    The visualized paranasal sinuses and mastoid air cells are clear.   Visualized osseous structures are intact.    IMPRESSION:     No acute intracranial hemorrhage, mass effect, or evidence of acute   vascular territorial infarction.    If clinical symptoms persist or worsen, short-term repeat CT head or more   sensitive evaluation with brain MRI may be obtained, if no   contraindications exist.          Assessment and Recommendation:   89yo male from home, lives with wife, no home attendant, PMH CAD s/p CABG (12-13 years ago), s/p left hip pinning for left hip fracture (8 years ago), DM, HTN, BPH with urinary retention p/w worsening shortness of breath and altered mental status. History and ROS obtained from son (Esteban [phone], at bedside).  Patient was recently admitted to [facility] from 3/29 to 4/6/2018 for right hip fracture after mechanical fall.   Patient was intubated and admitted to ICU and was given Vancomycin and Cefepime.  4/16/18 Patient was extubated and tolerated it well.  Patient was transferred to a regular floor on 4/17/18.  4/18/18 WBC is increased but patient feels better.  CT of the hip suggested for collection(hematoma and/or infection).  Orthopedic surgeon indicated that he would not do any intervention to hip findings as it represent hematoma and would resolve on its own.    Problem/Recommendation - 1:  Problem: Pneumonia, bacterial.   Recommendation:   1- UA & CS.  2- Follow Blood culture to final report.  3- Continue Vancomycin 500 mg IVPB q day to change upon discharge to po doxycycline 100 mg po q 24 hours till 4/25/18 .  4- Continue IV Cefepime 1 gm IVPB q day to change upon discharge to po Ceftin 250 mg po q 12 hours till 4/25/18.  5- Fluid and electrolytes management.  6- CBC and BMP follow up.   7- Follow Vancomycin trough closely and adjust Vancomycin dose accordingly.  8- Observe for fever or leukocytosis.    Problem/Recommendation - 2:  ·  Problem: Postoperative wound infection, sequela.    Recommendation:   1- Orthopedic follow up.  2- ESR and CRP weekly x 6 weeks for follow up as orthopedic feels that CT findings represent hematoma not infection.  3- Wound care right hip.     Problem/Recommendation - 3:  ·  Problem: Type 2 diabetes mellitus with stage 3 chronic kidney disease, unspecified long term insulin use status.    Recommendation:   1- Blood sugar monitoring and control.  2- Accu-Cheks with coverage.  3- 1800 karolyn ADA diet.  4- Follow HB A1C.     Problem/Recommendation - 4:  ·  Problem: Essential hypertension.    Recommendation:   1- Monitor Blood pressure closely.  2- Blood pressure control.  3- BP. meds as per cardiology and primary care team.     Discussed with medical resident.

## 2018-04-20 NOTE — PROGRESS NOTE ADULT - SUBJECTIVE AND OBJECTIVE BOX
CHIEF COMPLAINT:Patient is a 89y old  Male who presents with a chief complaint of resp distress. Pt appears comfortable.    	  REVIEW OF SYSTEMS:  CONSTITUTIONAL: No fever, weight loss, or fatigue  EYES: No eye pain, visual disturbances, or discharge  ENT:  No difficulty hearing, tinnitus, vertigo; No sinus or throat pain  NECK: No pain or stiffness  RESPIRATORY: No cough, wheezing, chills or hemoptysis; No Shortness of Breath  CARDIOVASCULAR: No chest pain, palpitations, passing out, dizziness, or leg swelling  GASTROINTESTINAL: No abdominal or epigastric pain. No nausea, vomiting, or hematemesis; No diarrhea or constipation. No melena or hematochezia.  GENITOURINARY: No dysuria, frequency, hematuria, or incontinence  NEUROLOGICAL: No headaches, memory loss, loss of strength, numbness, or tremors  SKIN: No itching, burning, rashes, or lesions   LYMPH Nodes: No enlarged glands  ENDOCRINE: No heat or cold intolerance; No hair loss  MUSCULOSKELETAL: No joint pain or swelling; No muscle, back, or extremity pain  PSYCHIATRIC: No depression, anxiety, mood swings, or difficulty sleeping  HEME/LYMPH: No easy bruising, or bleeding gums  ALLERGY AND IMMUNOLOGIC: No hives or eczema	      PHYSICAL EXAM:  T(C): 36.2 (04-20-18 @ 06:00), Max: 36.6 (04-19-18 @ 14:55)  HR: 74 (04-20-18 @ 06:00) (73 - 85)  BP: 144/60 (04-20-18 @ 06:00) (128/58 - 144/60)  RR: 18 (04-20-18 @ 06:00) (16 - 18)  SpO2: 100% (04-20-18 @ 06:00) (95% - 100%)    I&O's Summary    19 Apr 2018 07:01  -  20 Apr 2018 07:00  --------------------------------------------------------  IN: 0 mL / OUT: 250 mL / NET: -250 mL    20 Apr 2018 07:01  -  20 Apr 2018 11:27  --------------------------------------------------------  IN: 0 mL / OUT: 250 mL / NET: -250 mL        Appearance: Normal	  HEENT:   Normal oral mucosa, PERRL, EOMI	  Lymphatic: No lymphadenopathy  Cardiovascular: Normal S1 S2, No JVD, No murmurs, No edema  Respiratory: Lungs clear to auscultation	  Psychiatry: A & O x 3, Mood & affect appropriate  Gastrointestinal:  Soft, Non-tender, + BS	  Skin: No rashes, No ecchymoses, No cyanosis	  Neurologic: Non-focal  Extremities: Normal range of motion, No clubbing, cyanosis or edema  Vascular: Peripheral pulses palpable 2+ bilaterally    MEDICATIONS  (STANDING):  ALBUTerol/ipratropium for Nebulization 3 milliLiter(s) Nebulizer every 6 hours  aspirin  chewable 81 milliGRAM(s) Oral daily  ATENolol  Tablet 25 milliGRAM(s) Oral daily  atorvastatin 40 milliGRAM(s) Oral at bedtime  calcium acetate 667 milliGRAM(s) Oral three times a day with meals  cefepime  IVPB 1000 milliGRAM(s) IV Intermittent every 24 hours  cefepime  IVPB      citalopram 20 milliGRAM(s) Oral daily  clopidogrel Tablet 75 milliGRAM(s) Oral daily  collagenase Ointment 1 Application(s) Topical daily  epoetin vivian Injectable 6000 Unit(s) SubCutaneous <User Schedule>  finasteride 5 milliGRAM(s) Oral daily  furosemide    Tablet 40 milliGRAM(s) Oral <User Schedule>  heparin  Injectable 5000 Unit(s) SubCutaneous every 8 hours  insulin glargine Injectable (LANTUS) 10 Unit(s) SubCutaneous every morning  insulin lispro (HumaLOG) corrective regimen sliding scale   SubCutaneous three times a day before meals  iron sucrose IVPB 100 milliGRAM(s) IV Intermittent every 24 hours  isosorbide   mononitrate ER Tablet (IMDUR) 30 milliGRAM(s) Oral daily  pantoprazole  Injectable 40 milliGRAM(s) IV Push daily  tamsulosin 0.4 milliGRAM(s) Oral at bedtime  vancomycin  IVPB 500 milliGRAM(s) IV Intermittent every 24 hours      LABS:	 	                  7.7    5.0   )-----------( 208      ( 20 Apr 2018 08:13 )             25.0     04-20    133<L>  |  99  |  38<H>  ----------------------------<  233<H>  3.8   |  24  |  3.13<H>    Ca    8.1<L>      20 Apr 2018 08:13  Phos  2.9     04-20  Mg     1.8     04-20      proBNP: Serum Pro-Brain Natriuretic Peptide: 77068 pg/mL (04-16 @ 04:50)    Lipid Profile: Cholesterol 120  LDL 39  HDL 55      HgA1c: Hemoglobin A1C, Whole Blood: 5.6 % (04-02 @ 10:20)    TSH: Thyroid Stimulating Hormone, Serum: 2.77 uU/mL (03-30 @ 07:26)

## 2018-04-20 NOTE — DISCHARGE NOTE ADULT - SECONDARY DIAGNOSIS.
Closed fracture of right hip with delayed healing, subsequent encounter Diabetes LOBITO (acute kidney injury) HLD (hyperlipidemia) Essential hypertension CAD (coronary artery disease)

## 2018-04-20 NOTE — DISCHARGE NOTE ADULT - MEDICATION SUMMARY - MEDICATIONS TO TAKE
I will START or STAY ON the medications listed below when I get home from the hospital:    finasteride 5 mg oral tablet  -- 1 tab(s) by mouth once a day  -- Indication: For benign prostatic hyperplasia    Ultram 50 mg oral tablet  -- 1 tab(s) by mouth every 6 hours, As Needed -for moderate pain MDD:4  -- Caution federal law prohibits the transfer of this drug to any person other  than the person for whom it was prescribed.  May cause drowsiness.  Alcohol may intensify this effect.  Use care when operating dangerous machinery.  Obtain medical advice before taking any non-prescription drugs as some may affect the action of this medication.    -- Indication: For Pain    Ascriptin 325 mg oral tablet  -- 1 tab(s) by mouth once a day   -- Take with food or milk.    -- Indication: For Coronary artery disease involving coronary bypass graft of native heart without angina pectoris    tamsulosin 0.4 mg oral capsule  -- 1 cap(s) by mouth once a day (at bedtime)  -- Indication: For benign prostatic hyperplasia    ranolazine 500 mg oral tablet, extended release  -- 1 tab(s) by mouth 2 times a day  -- Indication: For Coronary artery disease involving coronary bypass graft of native heart without angina pectoris    isosorbide mononitrate 30 mg oral tablet, extended release  -- 1 tab(s) by mouth once a day  -- Indication: For Coronary artery disease involving coronary bypass graft of native heart without angina pectoris    citalopram 20 mg oral tablet  -- 1 tab(s) by mouth once a day  -- Indication: For Depression    glimepiride 4 mg oral tablet  -- 1 tab(s) by mouth once a day  -- Indication: For Diabetes    Lantus 100 units/mL subcutaneous solution  -- 40  subcutaneous once a day in the morning  -- Indication: For Diabetes    Crestor 10 mg oral tablet  -- 1 tab(s) by mouth once a day (at bedtime)  -- Indication: For Hyperlipidemia    doxycycline hyclate 100 mg oral capsule  -- 1 cap(s) by mouth once a day   -- Avoid prolonged or excessive exposure to direct and/or artificial sunlight while taking this medication.  Do not take this drug if you are pregnant.  Finish all this medication unless otherwise directed by prescriber.  Medication should be taken with plenty of water.    -- Indication: For Suspected infection of hematoma of right hip    Plavix 75 mg oral tablet  -- 1 tab(s) by mouth once a day  -- Indication: For CAD (coronary artery disease)    zolpidem 10 mg oral tablet  -- 1 tab(s) by mouth once a day (at bedtime)  -- Indication: For insomnia    atenolol 25 mg oral tablet  -- 1 tab(s) by mouth once a day  -- Indication: For Coronary artery disease involving coronary bypass graft of native heart without angina pectoris    Norvasc 10 mg oral tablet  -- 1 tab(s) by mouth once a day  -- Indication: For Hypertension    Ceftin 250 mg oral tablet  -- 1 tab(s) by mouth every 12 hours   -- Finish all this medication unless otherwise directed by prescriber.  Medication should be taken with plenty of water.  Take with food or milk.    -- Indication: For infectious hematoma suspected    furosemide 40 mg oral tablet  -- 1 tab(s) by mouth 3 times a week x 30 days   tuesday, thursday, saturday or every other day  -- Indication: For fluid over load    ferrous sulfate 325 mg (65 mg elemental iron) oral tablet  -- 1 tab(s) by mouth once a day  -- Indication: For iron deficiency anemia    Colace 100 mg oral capsule  -- 1 cap(s) by mouth 2 times a day   -- Medication should be taken with plenty of water.    -- Indication: For Constipation    Senna 8.6 mg oral tablet  -- 1 tab(s) by mouth once a day (at bedtime)  -- Indication: For Constipation    MiraLax oral powder for reconstitution  -- 17 gram(s) by mouth once a day, As Needed   -- Dilute this medication with liquid before administration.  It is very important that you take or use this exactly as directed.  Do not skip doses or discontinue unless directed by your doctor.    -- Indication: For Constipation    calcium acetate 667 mg oral tablet  -- 1 tab(s) by mouth 3 times a day   -- Indication: For Chronic kidney disease    omeprazole 20 mg oral delayed release capsule  -- 1 cap(s) by mouth once a day  -- Indication: For GI protective

## 2018-04-20 NOTE — DISCHARGE NOTE ADULT - PATIENT PORTAL LINK FT
You can access the [facility] Patient Portal, offered by Health system, by registering with the following website: [email or web address]

## 2018-04-20 NOTE — PROGRESS NOTE ADULT - SUBJECTIVE AND OBJECTIVE BOX
pt seen and examined.pts current chart reviewed and case discussed with resident covering.    SUBJECTIVE:  Patient was seen and examined, no complaints. Advised to follow up with Dr. Rodriguez and vascular as outpatient. DC planning for today.    REVIEW OF SYSTEMS:  CONSTITUTIONAL: No weakness, fevers or chills  EYES/ENT: No visual changes;  No vertigo or throat pain   NECK: No pain or stiffness  RESPIRATORY: No cough, wheezing, hemoptysis; No shortness of breath  CARDIOVASCULAR: No chest pain or palpitations  GASTROINTESTINAL: No abdominal or epigastric pain. No nausea, vomiting, or hematemesis; No diarrhea or constipation. No melena or hematochezia.  GENITOURINARY: No dysuria, frequency , hematuria, flank pain or nocturia  NEUROLOGICAL: No numbness or weakness  SKIN: No itching, burning, rashes, or lesions   All other review of systems is negative unless indicated above    Current meds:    ALBUTerol/ipratropium for Nebulization 3 milliLiter(s) Nebulizer every 6 hours  aspirin  chewable 81 milliGRAM(s) Oral daily  ATENolol  Tablet 25 milliGRAM(s) Oral daily  atorvastatin 40 milliGRAM(s) Oral at bedtime  calcium acetate 667 milliGRAM(s) Oral three times a day with meals  cefepime  IVPB 1000 milliGRAM(s) IV Intermittent every 24 hours  cefepime  IVPB      citalopram 20 milliGRAM(s) Oral daily  clopidogrel Tablet 75 milliGRAM(s) Oral daily  collagenase Ointment 1 Application(s) Topical daily  epoetin vivian Injectable 6000 Unit(s) SubCutaneous <User Schedule>  finasteride 5 milliGRAM(s) Oral daily  furosemide    Tablet 40 milliGRAM(s) Oral <User Schedule>  heparin  Injectable 5000 Unit(s) SubCutaneous every 8 hours  HYDROmorphone  Injectable 1 milliGRAM(s) IV Push every 4 hours PRN  insulin glargine Injectable (LANTUS) 10 Unit(s) SubCutaneous every morning  insulin lispro (HumaLOG) corrective regimen sliding scale   SubCutaneous three times a day before meals  iron sucrose IVPB 100 milliGRAM(s) IV Intermittent every 24 hours  isosorbide   mononitrate ER Tablet (IMDUR) 30 milliGRAM(s) Oral daily  pantoprazole  Injectable 40 milliGRAM(s) IV Push daily  tamsulosin 0.4 milliGRAM(s) Oral at bedtime  vancomycin  IVPB 500 milliGRAM(s) IV Intermittent every 24 hours  zolpidem 10 milliGRAM(s) Oral at bedtime PRN      Vital Signs    T(F): 97.2 (18 @ 06:00), Max: 97.9 (18 @ 14:55)  HR: 74 (18 @ 06:00) (73 - 85)  BP: 144/60 (18 @ 06:00) (128/58 - 144/60)  ABP: --  RR: 18 (18 @ 06:00) (16 - 18)  SpO2: 100% (18 @ 06:00) (95% - 100%)  Wt(kg): --  CVP(cm H2O): --  CO: --  PCWP: --    I and O's:     @ 07:01  -   @ 07:00  --------------------------------------------------------  IN:  Total IN: 0 mL    OUT:    Voided: 250 mL  Total OUT: 250 mL    Total NET: -250 mL       @ 07:01  -   @ 10:16  --------------------------------------------------------  IN:  Total IN: 0 mL    OUT:    Voided: 250 mL  Total OUT: 250 mL    Total NET: -250 mL        Daily     Daily Weight in k.7 (2018 07:32)    PHYSICAL EXAM:  Constitutional: well developed, well nourished  and in nad  HEENT: PERRLA,  no icteric sclera and mild pallor of conjunctiva noted  Neck: No JVD, thyromegaly or adenopathy  Respiratory: no rales, wheezing or rhonchi  Cardiovascular: S1 and S2 normally heard  Gastrointestinal: soft, nondistended, nontender and normal bowel sounds heard  Extremities: No peripheral edema or cyanosis  Neurological: A/O x 3, no focal deficits  : No flank or cva tenderness palpated.  Skin: No rashes      LABS:    CBC:                          7.7    5.0   )-----------( 208      ( 2018 08:13 )             25.0           BMP:        133<L>  |  99  |  38<H>  ----------------------------<  233<H>  3.8   |  24  |  3.13<H>      134<L>  |  97  |  40<H>  ----------------------------<  225<H>  4.1   |  22  |  3.48<H>      141  |  110<H>  |  83<H>  ----------------------------<  110<H>  3.8   |  18<L>  |  3.31<H>    Ca    8.1<L>      2018 08:13  Ca    7.7<L>      2018 06:48  Ca    7.5<L>      2018 05:07  Phos  2.9       Phos  3.3       Mg     1.8       Mg     2.0         TPro  5.8<L>  /  Alb  2.1<L>  /  TBili  1.0  /  DBili  x   /  AST  8<L>  /  ALT  7<L>  /  AlkPhos  71        Intact PTH: 161 pg/mL ( @ 13:28)      URINE STUDIES:      Osmolality, Random Urine: 281 mos/kg ( @ 13:54)  Sodium, Random Urine: 115 mmol/L ( @ 13:54)  Creatinine, Random Urine: <13 mg/dL ( @ 13:54)      RADIOLOGY & ADDITIONAL STUDIES:    < from:  Renal (18 @ 13:03) >  FINDINGS:  The right kidney measures 10.4 x 4.4 x 4.6 cm.    The left kidney measures 9.5 x 5.3 x 3.8 cm.    There is no hydronephrosis. Small bilateral renal cysts are noted   measuring up to 1.4 cm on the left kidney    Echogenic kidneys noted which may represent medical renal disease    IMPRESSION:  No hydronephrosis.     < end of copied text >

## 2018-04-20 NOTE — DISCHARGE NOTE ADULT - INSTRUCTIONS
The DASH diet is rich in fruits, vegetables, whole grains, and low-fat dairy foods; includes meat, fish, poultry, nuts, and beans; and is limited in sugar-sweetened foods and beverages, red meat, and added fats.

## 2018-04-20 NOTE — PROGRESS NOTE ADULT - ASSESSMENT
89 yr old male from home, lives with wife, no home attendant, PMH CAD s/p CABG (12-13 years ago), s/p left hip pinning for left hip fracture (8 years ago), DM, HTN, BPH with urinary retention p/w worsening shortness of breath and altered mental status due to pneumonia.  1.ABX.  2.CAD-cont asa,b blocker,statin.  3.DM-Insulin.  4.CRI-renal f/u.  5.BPH-Flomax.  6.GI and DVT prophylaxis.  7.Anemia-consider transfuse 1 unit prbc with lasix.

## 2018-04-20 NOTE — PROGRESS NOTE ADULT - ASSESSMENT
A/P:    1) LOBITO on CKD(stage 4 ?), pts egfr is stable over last 24 hours.  likely  pre-renal, with underlying ckd from diabetes and hypertensive disease  low albumin 2.1  -pro bnp 30,000 and cefepime and vanco for ?hip infection  -monitor vanc trough  -holding iv fluids  urine output 1.5L/24 hours  Fena 20- post obstructive likely 2/2 BPH  c/w BPH meds  Cr mildly worsened from yesterday 3.31--> 3.48 --> 3.13  renal sono neg for obstruction   baseline Cr ~3  -Avoid Nephrotoxic Meds/ Agents such as (NSAIDs, IV contrast, Aminoglycosides such as gentamicin, -Gadolinium contrast, Phosphate containing enemas, etc..)  -Adjust Medications according to eGFR  -f/u bmp daily  -f/u I/O s daily  -elevated PTH 2/2 renal disease  -family interested in HD if needed, advised to see vascular as outpatient for fistula placement  -can restart po lasix 40mg every other day  dc planning today    2) HTN  -on atenolol and imdur  monitor BP  -keep bp<140/80  -add low salt diet    3) ANEMIA:MF  -F/u Hb daily  -c/w epogen as inpatient  IV venofer x 5 days ordered, after which can start on po supplementation  CT hip shows possible developing hematoma, monitor H&H    4)HYPONATREMIA:mild, mf like ckd/hypervolemia  Na 141 --> 134 --> 133  resuming lasix  -f/u Na level  daily    5) Hyperphosphatemia, resolved  phosp level normal today  reduced phoslo to 667 1 tab TID  monitor phosph level in AM    6) METABOLIC ACIDOSIS, resolved  please get ABG in AM if worsening acidosis  reduced phoslo dose

## 2018-04-20 NOTE — DISCHARGE NOTE ADULT - HOSPITAL COURSE
1yo male from home, lives with wife, no home attendant, PMH CAD s/p CABG (12-13 years ago), s/p left hip pinning for left hip fracture (8 years ago), DM, HTN, BPH with urinary retention p/w worsening shortness of breath and altered mental status. History and ROS obtained from son (Esteban [phone], at bedside).  Patient was recently admitted to [facility] from 3/29 to 4/6/2018 for right hip fracture after mechanical fall.  Patient underwent right hip arthroplasty on 3/30 with Dr Sosa, complicated by ileus and E coli UTI, and discharged on 4/6 to home.  Patient has been receiving home PT.  2 days ago, while walking with therapist, patient endorsed shortness of breath and wheezing, resolved after rest.  The next day, son called patient's wife and was told he was doing well.  This early morning at 1am, grandson noticed that patient in distress with worsening sob and was disoriented.  911 called.  Son states that patient did not endorse fever, headache, change in vision or hearing, sore throat, chest pain, vomiting, diarrhea, hematochezia or hematuria.  Last colonoscopy 7 years ago. Endorses occasional nausea and 'gagging' with food.     3/29 TTE: grossly nl LV function    In ED, T 98.6 P 81 /80 RR 16    Patient was in CT scanner for head CT when patient became more short of breath and VBG returned [id number] 71  patient was intubated     CXR: ? RLL infiltrate vs effusion, prominent bilateral hilum  EKG: SR @ 81bpm, right axis deviation, RBBB, no change from previous EKG  T1 0.121, BNP   Patient was admitted for respiratory failure secondary to worsening of heart failure to intensive care unit. Patient was treated with diuresis with lasix. patient was intubated for a day and was extubated safely. Patient was transferred to regular medicine floor. Patient was then feeling better, was no longer hypoxic. Patient is advised to continue with diuresis with lasix three times a week as prescribed. patient can follow up out patient with nephrologist In 2 weeks and cardiologist in 2 weeks for continuos monitoring at regular intervals .  Patient came in with displaced fracture of right hip. fracture was relocated with closed reduction. staples were removed. wound remained clean and dry. patient had a CT scan positive for a hematoma around relocated hip. continue with ceftin 250 two times a day and doxycycline 100 mg daily for 5 more days until 4/25/18. follow up with orthopedics in 2 weeks. physical therapy at home with hip care.  Patient has history of diabetes. patient can continue with home medications for blood glucose control including glipizide and lantus 40 units daily. follow up with endocrinologist in 4 weeks for monitoring of blood glucose. avoid high carbohydrate diet.  patient has worsening renal function could be due to underlying worsening of chronic kidney disease secondary to diabetes. Patient can continue with lasix, calcium acetate and iron supplements. patient can follow up with nephrolgist in 2 weeks for routine monitoring of kidney function. patient's kidney function is at baseline on discharge.Patient can continue with home medication crestor. lipid profil is with in normal limits on this admission. for CAD continue with home medications aspirin, plavix, atenolol and crestor.  continue with home medication atenolol. blood pressure well controlled here.    Plan of care was discussed with patient and family at bed side. they understand and agree with the plan. patient will be discharged to home in stable condition. physical therapy recommended rehabilitation for the patient but family deiceded to take patient home with home care. for further information refer to patient's EMR and chart as this is only a brief summary.

## 2018-04-20 NOTE — DISCHARGE NOTE ADULT - CARE PLAN
Principal Discharge DX:	Acute respiratory failure with hypercapnia  Goal:	resolved  Assessment and plan of treatment:	Patient was admitted for respiratory failure secondary to worsening of heart failure to intensive care unit. Patient was treated with diuresis with lasix. patient was intubated for a day and was extubated safely. Patient was transferred to regular medicine floor. Patient was then feeling better, was no longer hypoxic. Patient is advised to continue with diuresis with lasix three times a week as prescribed. patient can follow up out patient with nephrologist In 2 weeks and cardiologist in 2 weeks for continuos monitoring at regular intervals .  Secondary Diagnosis:	Closed fracture of right hip with delayed healing, subsequent encounter  Assessment and plan of treatment:	Patient came in with displaced fracture of right hip. fracture was relocated with closed reduction. staples were removed. wound remained clean and dry. patient had a CT scan positive for a hematoma around relocated hip. continue with ceftin 250 two times a day and doxycycline 100 mg daily for 5 more days until 4/25/18. follow up with orthopedics in 2 weeks. physical therapy at home with hip care.  Secondary Diagnosis:	Diabetes  Assessment and plan of treatment:	Patient has history of diabetes. patient can continue with home medications for blood glucose control including glipizide and lantus 40 units daily. follow up with endocrinologist in 4 weeks for monitoring of blood glucose. avoid high carbohydrate diet.  Secondary Diagnosis:	LOBITO (acute kidney injury)  Assessment and plan of treatment:	patient has worsening renal function could be due to underlying worsening of chronic kidney disease secondary to diabetes. Patient can continue with lasix, calcium acetate and iron supplements. patient can follow up with nephrolgist in 2 weeks for routine monitoring of kidney function. patient's kidney function is at baseline on discharge.  Secondary Diagnosis:	HLD (hyperlipidemia)  Assessment and plan of treatment:	Patient can continue with home medication crestor. lipid profil is with in normal limits on this admission  Secondary Diagnosis:	Essential hypertension  Goal:	BP goal <140/90  Assessment and plan of treatment:	continue with home medication atenolol. blood pressure well controlled here.  Secondary Diagnosis:	CAD (coronary artery disease)  Assessment and plan of treatment:	continue with home medications aspirin, plavix, atenolol and crestor. Principal Discharge DX:	Acute respiratory failure with hypercapnia  Goal:	resolved  Assessment and plan of treatment:	Patient was admitted for respiratory failure secondary to worsening of heart failure to intensive care unit. Patient was treated with diuresis with lasix. patient was intubated for a day and was extubated safely. Patient was transferred to regular medicine floor. Patient was then feeling better, was no longer hypoxic. Patient is advised to continue with diuresis with lasix three times a week as prescribed. patient can follow up out patient with nephrologist In 2 weeks and cardiologist in 2 weeks for continuos monitoring at regular intervals .  Secondary Diagnosis:	Closed fracture of right hip with delayed healing, subsequent encounter  Assessment and plan of treatment:	Patient came in with displaced fracture of right hip. fracture was relocated with closed reduction. staples were removed. wound remained clean and dry. patient had a CT scan positive for a hematoma around relocated hip. continue with ceftin 250 two times a day and doxycycline 100 mg daily for 5 more days until 4/25/18. follow up with orthopedics in 2 weeks. physical therapy at home with hip care. patient's hemoglobin was 7.7, received a unit of blood transfusion.  Secondary Diagnosis:	Diabetes  Assessment and plan of treatment:	Patient has history of diabetes. patient can continue with home medications for blood glucose control including glipizide and lantus 40 units daily. follow up with endocrinologist in 4 weeks for monitoring of blood glucose. avoid high carbohydrate diet.  Secondary Diagnosis:	LOBITO (acute kidney injury)  Assessment and plan of treatment:	patient has worsening renal function could be due to underlying worsening of chronic kidney disease secondary to diabetes. Patient can continue with lasix, calcium acetate and iron supplements. patient can follow up with nephrolgist in 2 weeks for routine monitoring of kidney function. patient's kidney function is at baseline on discharge.  Secondary Diagnosis:	HLD (hyperlipidemia)  Assessment and plan of treatment:	Patient can continue with home medication crestor. lipid profil is with in normal limits on this admission  Secondary Diagnosis:	Essential hypertension  Goal:	BP goal <140/90  Assessment and plan of treatment:	continue with home medication atenolol. blood pressure well controlled here.  Secondary Diagnosis:	CAD (coronary artery disease)  Assessment and plan of treatment:	continue with home medications aspirin, plavix, atenolol and crestor.

## 2018-04-20 NOTE — DISCHARGE NOTE ADULT - PLAN OF CARE
resolved Patient was admitted for respiratory failure secondary to worsening of heart failure to intensive care unit. Patient was treated with diuresis with lasix. patient was intubated for a day and was extubated safely. Patient was transferred to regular medicine floor. Patient was then feeling better, was no longer hypoxic. Patient is advised to continue with diuresis with lasix three times a week as prescribed. patient can follow up out patient with nephrologist In 2 weeks and cardiologist in 2 weeks for continuos monitoring at regular intervals . Patient came in with displaced fracture of right hip. fracture was relocated with closed reduction. staples were removed. wound remained clean and dry. patient had a CT scan positive for a hematoma around relocated hip. continue with ceftin 250 two times a day and doxycycline 100 mg daily for 5 more days until 4/25/18. follow up with orthopedics in 2 weeks. physical therapy at home with hip care. Patient has history of diabetes. patient can continue with home medications for blood glucose control including glipizide and lantus 40 units daily. follow up with endocrinologist in 4 weeks for monitoring of blood glucose. avoid high carbohydrate diet. patient has worsening renal function could be due to underlying worsening of chronic kidney disease secondary to diabetes. Patient can continue with lasix, calcium acetate and iron supplements. patient can follow up with nephrolgist in 2 weeks for routine monitoring of kidney function. patient's kidney function is at baseline on discharge. Patient can continue with home medication crestor. lipid profil is with in normal limits on this admission BP goal <140/90 continue with home medication atenolol. blood pressure well controlled here. continue with home medications aspirin, plavix, atenolol and crestor. Patient came in with displaced fracture of right hip. fracture was relocated with closed reduction. staples were removed. wound remained clean and dry. patient had a CT scan positive for a hematoma around relocated hip. continue with ceftin 250 two times a day and doxycycline 100 mg daily for 5 more days until 4/25/18. follow up with orthopedics in 2 weeks. physical therapy at home with hip care. patient's hemoglobin was 7.7, received a unit of blood transfusion.

## 2018-04-20 NOTE — PROGRESS NOTE ADULT - PROVIDER SPECIALTY LIST ADULT
Cardiology
Cardiology
Critical Care
Diabetes
Infectious Disease
Internal Medicine
Nephrology
Orthopedics
Infectious Disease
Diabetes
Internal Medicine
Nephrology
Internal Medicine

## 2018-04-28 ENCOUNTER — EMERGENCY (EMERGENCY)
Facility: HOSPITAL | Age: 83
LOS: 1 days | Discharge: HOME | End: 2018-04-28
Attending: EMERGENCY MEDICINE
Payer: MEDICARE

## 2018-04-28 VITALS
TEMPERATURE: 98 F | SYSTOLIC BLOOD PRESSURE: 135 MMHG | HEART RATE: 71 BPM | HEIGHT: 66 IN | WEIGHT: 132.06 LBS | RESPIRATION RATE: 16 BRPM | OXYGEN SATURATION: 98 % | DIASTOLIC BLOOD PRESSURE: 66 MMHG

## 2018-04-28 VITALS
OXYGEN SATURATION: 99 % | HEART RATE: 74 BPM | DIASTOLIC BLOOD PRESSURE: 68 MMHG | SYSTOLIC BLOOD PRESSURE: 168 MMHG | RESPIRATION RATE: 18 BRPM | TEMPERATURE: 98 F

## 2018-04-28 DIAGNOSIS — Z95.1 PRESENCE OF AORTOCORONARY BYPASS GRAFT: Chronic | ICD-10-CM

## 2018-04-28 DIAGNOSIS — Z98.89 OTHER SPECIFIED POSTPROCEDURAL STATES: Chronic | ICD-10-CM

## 2018-04-28 PROCEDURE — 73502 X-RAY EXAM HIP UNI 2-3 VIEWS: CPT | Mod: 26,RT

## 2018-04-28 PROCEDURE — 73501 X-RAY EXAM HIP UNI 1 VIEW: CPT

## 2018-04-28 PROCEDURE — 72170 X-RAY EXAM OF PELVIS: CPT | Mod: 26,59

## 2018-04-28 PROCEDURE — 82962 GLUCOSE BLOOD TEST: CPT

## 2018-04-28 PROCEDURE — 99285 EMERGENCY DEPT VISIT HI MDM: CPT

## 2018-04-28 PROCEDURE — 93005 ELECTROCARDIOGRAM TRACING: CPT

## 2018-04-28 PROCEDURE — 27266 TREAT HIP DISLOCATION: CPT | Mod: RT

## 2018-04-28 PROCEDURE — 93010 ELECTROCARDIOGRAM REPORT: CPT

## 2018-04-28 PROCEDURE — 73502 X-RAY EXAM HIP UNI 2-3 VIEWS: CPT

## 2018-04-28 PROCEDURE — 99285 EMERGENCY DEPT VISIT HI MDM: CPT | Mod: 25

## 2018-04-28 NOTE — ED PROVIDER NOTE - OBJECTIVE STATEMENT
89 y/o male with PMHx of CAD, HLD, DM, HTN presents to the ED c/o R hip pain x 3 days. Pt notes she had a R hip replacement surgery done by Dr. Daniels x 1 month. Pt was admitted to ICU x 2 weeks for respiratory failure and was also found to have a dislocated R hip, Dr. Daniels reduced it back. Pt then notes he has been having R hip pain with walking x 3 days until today when he woke up and noticed his R leg was shortened and internally rotated. Pt called PMD who spoke with Dr. Daniels and advised pt to visit ED. Pt denies numbness, tingling, or any other complaints. MARLENE 89 y/o male with PMHx of CAD, HLD, DM, HTN presents to the ED c/o R hip pain x 3 days. Pt notes she had a R hip replacement surgery done by Dr. Sosa x 1 month. Pt was admitted to ICU x 2 weeks for respiratory failure and was also found to have a dislocated R hip, Dr. Sosa reduced it back. Pt then notes he has been having R hip pain with walking x 3 days until today when he woke up and noticed his R leg was shortened and internally rotated. Pt called PMD who spoke with Dr. Sosa and advised pt to visit ED. Pt denies numbness, tingling, or any other complaints. MARLENE

## 2018-04-28 NOTE — CONSULT NOTE ADULT - SUBJECTIVE AND OBJECTIVE BOX
89yo s/p left hip hemiarthroplasty on 3/30/18 and readmitted with medical issues and while intubated in ICU I saw him on 4/15/18 and was found to have a dislocated prosthesis which was reduced and abduction pillow placed.   After discharge home he was getting PT ambulation. I was contacted by family with possible redislocation and I recommended going to [facility] ER. Family says he started to get increased pain and then leg was turned in with increased pain.    PE: Awake and alert       Right leg shortened and internally rotated with marked groin pain on any hip motion.       Moving toes and ankle with sensation present.     X-ray: Right hip dislocation of prosthesis.    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         S/P right hip hemiarthroplasty for femoral neck fracture    Procedure Note  In ER under conscious sedation by Dr Lennon the right hip was reduced and placed in knee immobilizer.  Xrays right hip showed prosthesis reduced in acetabulum  Moving toes and ankle with sensation present.  Also placed in abduction pillow.  Family decided to take pt home and I discussed mechanism and position of leg to be avoided to try to prevent another dislocation.  I also gave them a prescription for a hip abduction brace and can continue PT with brace.  If hip continues to dislocate may be candidate for revision surgery.

## 2018-04-28 NOTE — ED PROVIDER NOTE - PROGRESS NOTE DETAILS
Spoke with Dr. Daniels, will come to ED to see pt. Spoke with Dr. Sosa, will come to ED to see pt. Hip reduced with Dr. Sosa. I supplied sedation, Dr. Sosa did procedure. Hip reduced with Dr. Sosa. I supplied sedation, Dr. Sosa did procedure. Placed in knee immobilizer AAOx3, gait steady, speech clear. Ate applesauce.

## 2018-06-19 ENCOUNTER — EMERGENCY (EMERGENCY)
Facility: HOSPITAL | Age: 83
LOS: 1 days | Discharge: HOME | End: 2018-06-19
Attending: EMERGENCY MEDICINE
Payer: MEDICARE

## 2018-06-19 VITALS
TEMPERATURE: 97 F | DIASTOLIC BLOOD PRESSURE: 51 MMHG | SYSTOLIC BLOOD PRESSURE: 138 MMHG | OXYGEN SATURATION: 95 % | RESPIRATION RATE: 17 BRPM | HEART RATE: 59 BPM

## 2018-06-19 VITALS
OXYGEN SATURATION: 99 % | RESPIRATION RATE: 16 BRPM | HEART RATE: 67 BPM | TEMPERATURE: 98 F | HEIGHT: 64.96 IN | WEIGHT: 130.07 LBS | DIASTOLIC BLOOD PRESSURE: 58 MMHG | SYSTOLIC BLOOD PRESSURE: 99 MMHG

## 2018-06-19 DIAGNOSIS — Z98.89 OTHER SPECIFIED POSTPROCEDURAL STATES: Chronic | ICD-10-CM

## 2018-06-19 DIAGNOSIS — Z95.1 PRESENCE OF AORTOCORONARY BYPASS GRAFT: Chronic | ICD-10-CM

## 2018-06-19 LAB
ALBUMIN SERPL ELPH-MCNC: 2.9 G/DL — LOW (ref 3.5–5)
ALP SERPL-CCNC: 103 U/L — SIGNIFICANT CHANGE UP (ref 40–120)
ALT FLD-CCNC: 15 U/L DA — SIGNIFICANT CHANGE UP (ref 10–60)
ANION GAP SERPL CALC-SCNC: 12 MMOL/L — SIGNIFICANT CHANGE UP (ref 5–17)
AST SERPL-CCNC: 21 U/L — SIGNIFICANT CHANGE UP (ref 10–40)
BILIRUB SERPL-MCNC: 0.3 MG/DL — SIGNIFICANT CHANGE UP (ref 0.2–1.2)
BUN SERPL-MCNC: 74 MG/DL — HIGH (ref 7–18)
CALCIUM SERPL-MCNC: 8.3 MG/DL — LOW (ref 8.4–10.5)
CHLORIDE SERPL-SCNC: 108 MMOL/L — SIGNIFICANT CHANGE UP (ref 96–108)
CO2 SERPL-SCNC: 15 MMOL/L — LOW (ref 22–31)
CREAT SERPL-MCNC: 3.44 MG/DL — HIGH (ref 0.5–1.3)
GLUCOSE SERPL-MCNC: 211 MG/DL — HIGH (ref 70–99)
HCT VFR BLD CALC: 29.7 % — LOW (ref 39–50)
HGB BLD-MCNC: 9.3 G/DL — LOW (ref 13–17)
MCHC RBC-ENTMCNC: 29.4 PG — SIGNIFICANT CHANGE UP (ref 27–34)
MCHC RBC-ENTMCNC: 31.2 GM/DL — LOW (ref 32–36)
MCV RBC AUTO: 94.3 FL — SIGNIFICANT CHANGE UP (ref 80–100)
PLATELET # BLD AUTO: 171 K/UL — SIGNIFICANT CHANGE UP (ref 150–400)
POTASSIUM SERPL-MCNC: 5.4 MMOL/L — HIGH (ref 3.5–5.3)
POTASSIUM SERPL-SCNC: 5.4 MMOL/L — HIGH (ref 3.5–5.3)
PROT SERPL-MCNC: 6.6 G/DL — SIGNIFICANT CHANGE UP (ref 6–8.3)
RBC # BLD: 3.15 M/UL — LOW (ref 4.2–5.8)
RBC # FLD: 15 % — HIGH (ref 10.3–14.5)
SODIUM SERPL-SCNC: 135 MMOL/L — SIGNIFICANT CHANGE UP (ref 135–145)
WBC # BLD: 7 K/UL — SIGNIFICANT CHANGE UP (ref 3.8–10.5)
WBC # FLD AUTO: 7 K/UL — SIGNIFICANT CHANGE UP (ref 3.8–10.5)

## 2018-06-19 PROCEDURE — 90715 TDAP VACCINE 7 YRS/> IM: CPT

## 2018-06-19 PROCEDURE — 99284 EMERGENCY DEPT VISIT MOD MDM: CPT | Mod: 25

## 2018-06-19 PROCEDURE — 93005 ELECTROCARDIOGRAM TRACING: CPT

## 2018-06-19 PROCEDURE — 82962 GLUCOSE BLOOD TEST: CPT

## 2018-06-19 PROCEDURE — 12002 RPR S/N/AX/GEN/TRNK2.6-7.5CM: CPT

## 2018-06-19 PROCEDURE — 85027 COMPLETE CBC AUTOMATED: CPT

## 2018-06-19 PROCEDURE — 99285 EMERGENCY DEPT VISIT HI MDM: CPT | Mod: 25

## 2018-06-19 PROCEDURE — 80053 COMPREHEN METABOLIC PANEL: CPT

## 2018-06-19 PROCEDURE — 90471 IMMUNIZATION ADMIN: CPT

## 2018-06-19 RX ORDER — SODIUM CHLORIDE 9 MG/ML
1000 INJECTION INTRAMUSCULAR; INTRAVENOUS; SUBCUTANEOUS ONCE
Qty: 0 | Refills: 0 | Status: COMPLETED | OUTPATIENT
Start: 2018-06-19 | End: 2018-06-19

## 2018-06-19 RX ORDER — TETANUS TOXOID, REDUCED DIPHTHERIA TOXOID AND ACELLULAR PERTUSSIS VACCINE, ADSORBED 5; 2.5; 8; 8; 2.5 [IU]/.5ML; [IU]/.5ML; UG/.5ML; UG/.5ML; UG/.5ML
0.5 SUSPENSION INTRAMUSCULAR ONCE
Qty: 0 | Refills: 0 | Status: COMPLETED | OUTPATIENT
Start: 2018-06-19 | End: 2018-06-19

## 2018-06-19 RX ADMIN — SODIUM CHLORIDE 1000 MILLILITER(S): 9 INJECTION INTRAMUSCULAR; INTRAVENOUS; SUBCUTANEOUS at 13:27

## 2018-06-19 RX ADMIN — TETANUS TOXOID, REDUCED DIPHTHERIA TOXOID AND ACELLULAR PERTUSSIS VACCINE, ADSORBED 0.5 MILLILITER(S): 5; 2.5; 8; 8; 2.5 SUSPENSION INTRAMUSCULAR at 13:27

## 2018-06-19 NOTE — ED PROVIDER NOTE - OBJECTIVE STATEMENT
89 y/o M pt with PMHx of CABG 12 years ago, CAD, L hip pinning, DM, HTN, and BPH presents to ED with family after episode of lightheadedness after he stood up, causing him to loose his balance, fall and sustain a laceration to R eyebrow. Pt denies LOC or change in mental status. Pt has no other complaint and states he just wants his laceration repaired. Pt lives at home with his wife and is Cam[place]onian speaking.

## 2018-06-19 NOTE — ED PROVIDER NOTE - PROGRESS NOTE DETAILS
pt refusing ct head at this time due to radiation with head trauma on coumadin to ama patient, aware of risks of ich risk but willing to go home with this risk, has capacity, family at bedside and aware, given head injury instructions and reasons to return.   mansoor zimmerman

## 2018-06-19 NOTE — ED PROVIDER NOTE - MEDICAL DECISION MAKING DETAILS
Head laceration noted for closure. Will update Tdap. Pt refusing blood draw or CT of head at this time. Lengthy discussion with pt, his wife and daughter about risk/benefits of CT in light of head injury, age, and Plavix; pt still refusing CT. After wound closure, likely AMA with close head injury discharge instructions.

## 2018-06-19 NOTE — ED ADULT TRIAGE NOTE - CHIEF COMPLAINT QUOTE
Lac to l side of head s/p fall after feeling Dizzy ,denied loc Lac to R side of head s/p fall after feeling Dizzy ,denied loc

## 2018-06-19 NOTE — ED ADULT NURSE NOTE - OBJECTIVE STATEMENT
pt is here s/p fall after feeling dizzy - laceration to the right side of the head denies any LOC minimal bleeding

## 2018-06-26 ENCOUNTER — EMERGENCY (EMERGENCY)
Facility: HOSPITAL | Age: 83
LOS: 1 days | Discharge: HOME | End: 2018-06-26
Attending: EMERGENCY MEDICINE
Payer: MEDICARE

## 2018-06-26 VITALS
HEART RATE: 57 BPM | SYSTOLIC BLOOD PRESSURE: 165 MMHG | TEMPERATURE: 98 F | DIASTOLIC BLOOD PRESSURE: 59 MMHG | RESPIRATION RATE: 17 BRPM | OXYGEN SATURATION: 100 %

## 2018-06-26 VITALS
DIASTOLIC BLOOD PRESSURE: 63 MMHG | OXYGEN SATURATION: 100 % | HEART RATE: 98 BPM | WEIGHT: 130.07 LBS | TEMPERATURE: 98 F | SYSTOLIC BLOOD PRESSURE: 109 MMHG | HEIGHT: 66 IN | RESPIRATION RATE: 20 BRPM

## 2018-06-26 DIAGNOSIS — Z98.89 OTHER SPECIFIED POSTPROCEDURAL STATES: Chronic | ICD-10-CM

## 2018-06-26 DIAGNOSIS — Z95.1 PRESENCE OF AORTOCORONARY BYPASS GRAFT: Chronic | ICD-10-CM

## 2018-06-26 PROCEDURE — 70450 CT HEAD/BRAIN W/O DYE: CPT | Mod: 26

## 2018-06-26 PROCEDURE — 73590 X-RAY EXAM OF LOWER LEG: CPT

## 2018-06-26 PROCEDURE — 73590 X-RAY EXAM OF LOWER LEG: CPT | Mod: 26,LT

## 2018-06-26 PROCEDURE — 70450 CT HEAD/BRAIN W/O DYE: CPT

## 2018-06-26 PROCEDURE — 73610 X-RAY EXAM OF ANKLE: CPT | Mod: 26,LT

## 2018-06-26 PROCEDURE — 73610 X-RAY EXAM OF ANKLE: CPT

## 2018-06-26 PROCEDURE — 99284 EMERGENCY DEPT VISIT MOD MDM: CPT

## 2018-06-26 PROCEDURE — 82962 GLUCOSE BLOOD TEST: CPT

## 2018-06-26 PROCEDURE — 93005 ELECTROCARDIOGRAM TRACING: CPT

## 2018-06-26 PROCEDURE — 99284 EMERGENCY DEPT VISIT MOD MDM: CPT | Mod: 25

## 2018-06-26 RX ORDER — ACETAMINOPHEN 500 MG
650 TABLET ORAL ONCE
Qty: 0 | Refills: 0 | Status: COMPLETED | OUTPATIENT
Start: 2018-06-26 | End: 2018-06-26

## 2018-06-26 NOTE — ED PROCEDURE NOTE - PROCEDURE ADDITIONAL DETAILS
Procedure note from 6/19/18 procedure states that 6 sutures were placed however there are only 4 in place today and they were removed.

## 2018-06-26 NOTE — ED PROVIDER NOTE - CARE PLAN
Principal Discharge DX:	Sprain of left ankle, unspecified ligament, initial encounter  Secondary Diagnosis:	Hypoglycemia

## 2018-06-26 NOTE — ED PROVIDER NOTE - PROGRESS NOTE DETAILS
Pt doing well throughout ED stay.  Requesting sutures to be removed from well-healing right temporal wound, placed here about 1 week ago.  Pt with no Fx/dislocation on X-rays, and uses a walker at baseline and wants to go home and try continuing with walker.  BG improved after eating. Advised strict return precautions and PMD f/u.

## 2018-06-26 NOTE — ED PROVIDER NOTE - EXTREMITY EXAM
Knees non-tender. Mild L ankle swelling; no deformity. Diffuse tenderness to L ankle and lower leg with full ROM.

## 2018-06-26 NOTE — ED PROVIDER NOTE - OBJECTIVE STATEMENT
89 y/o M pt with a significant PMHx of CAD, DM, L hip fracture, HLD, HTN and a significant PSHx of hip surgery and CABG, on Aspirin and Plavix, presents to the ED c/o L ankle pain after fall x yesterday at 1700. Relative states that pt was by a sink, when he felt his head get full and his legs give up on hm. Pt reports sudden onset of weakness in legs that resolved right away. Pt did not have any symptoms x yesterday, but developed leg pain x today. Relative relates that pt had another mechanical fall x last week, where he sustained ecchymosis to his R eye; pt is f/u elsewhere for suture care from that fall. Pt adamantly denies head injury, LOC or any other complaints. NKDA.

## 2018-06-26 NOTE — ED PROVIDER NOTE - MEDICAL DECISION MAKING DETAILS
89 y/o M pt with suspected to be mechanical fall x yesterday as per reliable Hx from patient and relative. Clearly denies LOC and no focal weakness or numbness, however taking Aspirin and Plavix. Will check CT head and X-ray of injured LLE.

## 2018-11-10 NOTE — CONSULT NOTE ADULT - SUBJECTIVE AND OBJECTIVE BOX
HPI:  89yo male from home, lives with wife, no home attendant, PMH CAD s/p CABG (12-13 years ago), s/p left hip pinning for left hip fracture (8 years ago), DM, HTN, BPH with urinary retention p/w worsening shortness of breath and altered mental status.  Intubated for hypercapneic respiratory failure 2/2 hypoventilation 2/2 likely aspiration pna vs pulmonary edema    PAST MEDICAL & SURGICAL HISTORY:  HLD (hyperlipidemia)  Hip fracture, left  CAD (coronary artery disease): s/p by pass surgery  Diabetes  Hypertension  S/P CABG (coronary artery bypass graft)  History of hip surgery      SOCIAL HISTORY:    Admitted from:  home assisted living [facility]   Substance abuse history:              Tobacco hx:                  Alcohol hx:              Home Opioid hx:  Jew:                                    Preferred Language:    Surrogate/HCP/Guardian:            Phone#:    FAMILY HISTORY:  No pertinent family history in first degree relatives    Baseline ADLs (prior to admission):    Allergies    No Known Allergies    Intolerances      Present Symptoms:   Dyspnea:   Nausea/Vomiting:   Anxiety:  Depressed   Fatigue:  Loss of appetite:   Pain:                                location:          Review of Systems: [All others negative or Unable to obtain due to poor mentation]    MEDICATIONS  (STANDING):  aspirin  chewable 81 milliGRAM(s) Oral daily  ATENolol  Tablet 25 milliGRAM(s) Oral daily  atorvastatin 40 milliGRAM(s) Oral at bedtime  cefepime  IVPB 1000 milliGRAM(s) IV Intermittent every 24 hours  cefepime  IVPB      citalopram 20 milliGRAM(s) Oral daily  clopidogrel Tablet 75 milliGRAM(s) Oral daily  collagenase Ointment 1 Application(s) Topical daily  finasteride 5 milliGRAM(s) Oral daily  heparin  Injectable 5000 Unit(s) SubCutaneous every 8 hours  insulin lispro (HumaLOG) corrective regimen sliding scale   SubCutaneous every 4 hours  isosorbide   mononitrate ER Tablet (IMDUR) 30 milliGRAM(s) Oral daily  pantoprazole  Injectable 40 milliGRAM(s) IV Push daily  tamsulosin 0.4 milliGRAM(s) Oral at bedtime  vancomycin  IVPB 500 milliGRAM(s) IV Intermittent every 24 hours    MEDICATIONS  (PRN):  HYDROmorphone  Injectable 1 milliGRAM(s) IV Push every 4 hours PRN Severe Pain (7 - 10)      PHYSICAL EXAM:    Vital Signs Last 24 Hrs  T(C): 37.1 (17 Apr 2018 16:09), Max: 37.1 (17 Apr 2018 16:09)  T(F): 98.8 (17 Apr 2018 16:09), Max: 98.8 (17 Apr 2018 16:09)  HR: 67 (17 Apr 2018 16:09) (62 - 72)  BP: 108/49 (17 Apr 2018 16:09) (95/47 - 152/55)  BP(mean): 65 (17 Apr 2018 15:00) (59 - 107)  RR: 17 (17 Apr 2018 16:09) (11 - 24)  SpO2: 99% (17 Apr 2018 16:09) (99% - 100%)    General: alert  oriented x ____    [lethargic distressed cachexia  nonverbal  unarousable verbal]  Karnofsky Performance Score/Palliative Performance Status Version2:     %    HEENT: normal  dry mouth  ET tube/trach oral lesions:  Lungs: comfortable tachypnea/labored breathing  excessive secretions  CV: normal  tachycardia  GI: normal  distended  tender  incontinent               PEG/NG/OG tube  constipation  last BM:   : normal  incontinent  oliguria/anuria  hannon  Musculoskeletal: normal  weakness  edema             ambulatory  bedbound/wheelchair bound  Skin: normal  pressure ulcers: stage: edema: other:  Neuro: no deficits cognitive impairment dsyphagia/dysarthria paresis: other:  Oral intake ability: unable/only mouth care [minimal moderate full capability]  Diet: [NPO]    LABS:                        8.4    6.3   )-----------( 199      ( 17 Apr 2018 06:35 )             24.6     04-17    132<L>  |  97  |  38<H>  ----------------------------<  108<H>  4.5   |  27  |  3.38<H>    Ca    7.8<L>      17 Apr 2018 06:35  Phos  4.9     04-17  Mg     1.9     04-17    TPro  5.1<L>  /  Alb  1.9<L>  /  TBili  0.5  /  DBili  x   /  AST  18  /  ALT  10  /  AlkPhos  99  04-17        RADIOLOGY & ADDITIONAL STUDIES:    ADVANCE DIRECTIVES:   Advanced Care Planning discussion total time spent: HPI:  89yo male from home, lives with wife, no home attendant, PMH CAD s/p CABG (12-13 years ago), s/p left hip pinning for left hip fracture (8 years ago), DM, HTN, BPH with urinary retention p/w worsening shortness of breath and altered mental status. Pt admitted to ICU and intubated for hypercapneic respiratory failure 2/2 hypoventilation 2/2 likely aspiration pna.      PAST MEDICAL & SURGICAL HISTORY:  HLD (hyperlipidemia)  Hip fracture, left  CAD (coronary artery disease): s/p by pass surgery  Diabetes  Hypertension  S/P CABG (coronary artery bypass graft)  History of hip surgery      SOCIAL HISTORY:    Admitted from:  home, lives with wife, no HHA  Home Opioid hx: ultram 50 prn  Uatsdin:           Orthodox                         Preferred Language: Ivorian    Surrogate/HCP/Guardian: Esteban Duncan (son/surrogate): [phone]    FAMILY HISTORY:  No pertinent family history in first degree relatives    Baseline ADLs (prior to admission): per son's report, pt mostly bedbound     No Known Allergies    Present Symptoms:        Review of Systems: Pt with no c/o at time of exam      MEDICATIONS  (STANDING):  aspirin  chewable 81 milliGRAM(s) Oral daily  ATENolol  Tablet 25 milliGRAM(s) Oral daily  atorvastatin 40 milliGRAM(s) Oral at bedtime  cefepime  IVPB 1000 milliGRAM(s) IV Intermittent every 24 hours  cefepime  IVPB      citalopram 20 milliGRAM(s) Oral daily  clopidogrel Tablet 75 milliGRAM(s) Oral daily  collagenase Ointment 1 Application(s) Topical daily  finasteride 5 milliGRAM(s) Oral daily  heparin  Injectable 5000 Unit(s) SubCutaneous every 8 hours  insulin lispro (HumaLOG) corrective regimen sliding scale   SubCutaneous every 4 hours  isosorbide   mononitrate ER Tablet (IMDUR) 30 milliGRAM(s) Oral daily  pantoprazole  Injectable 40 milliGRAM(s) IV Push daily  tamsulosin 0.4 milliGRAM(s) Oral at bedtime  vancomycin  IVPB 500 milliGRAM(s) IV Intermittent every 24 hours    MEDICATIONS  (PRN):  HYDROmorphone  Injectable 1 milliGRAM(s) IV Push every 4 hours PRN Severe Pain (7 - 10)      PHYSICAL EXAM:    Vital Signs Last 24 Hrs  T(C): 37.1 (17 Apr 2018 16:09), Max: 37.1 (17 Apr 2018 16:09)  T(F): 98.8 (17 Apr 2018 16:09), Max: 98.8 (17 Apr 2018 16:09)  HR: 67 (17 Apr 2018 16:09) (62 - 72)  BP: 108/49 (17 Apr 2018 16:09) (95/47 - 152/55)  BP(mean): 65 (17 Apr 2018 15:00) (59 - 107)  RR: 17 (17 Apr 2018 16:09) (11 - 24)  SpO2: 99% (17 Apr 2018 16:09) (99% - 100%)    General: alert  oriented, responsive    Karnofsky Performance Score/Palliative Performance Status Version2:    30 %    HEENT: normal     Lungs: comfortable, on NC  CV: normal    GI: NGT,   : normal    Musculoskeletal: weakness x 4, + 2 bilateral  strength  Skin: coccyx and L heel unstageable  Neuro: pt alert, verbal, responsive, follows commands   Oral intake ability: unable/only mouth care    Diet: NPO    LABS:                        8.4    6.3   )-----------( 199      ( 17 Apr 2018 06:35 )             24.6     04-17    132<L>  |  97  |  38<H>  ----------------------------<  108<H>  4.5   |  27  |  3.38<H>    Ca    7.8<L>      17 Apr 2018 06:35  Phos  4.9     04-17  Mg     1.9     04-17    TPro  5.1<L>  /  Alb  1.9<L>  /  TBili  0.5  /  DBili  x   /  AST  18  /  ALT  10  /  AlkPhos  99  04-17    Albumin: 1.9    RADIOLOGY & ADDITIONAL STUDIES:  < from: Xray Chest 1 View- PORTABLE-Routine (04.17.18 @ 10:02) >  EXAM:  XR CHEST PORTABLE ROUTINE 1V                            PROCEDURE DATE:  04/17/2018          INTERPRETATION:  CLINICAL STATEMENT: Follow-up chest pain.    TECHNIQUE: AP view of the chest.    COMPARISON: 4/16/2018    FINDINGS/  IMPRESSION:  Sternotomy wires surgical clips, feeding tube again noted. ET tube no   longer seen. No pneumothorax.    Increased interstitial lung markings without significant change.   Bilateral pleural effusions and adjacent opacities without significant   change in the right given differences in technique. Slightly progressed   left midlung zone.    Heart size cannot be accurately assessed in this projection, but appear   enlarged.    < end of copied text >      ADVANCE DIRECTIVES: MOLST completed as per family's wishes: CPR / trial intubation / send to hospital / trial feeding tube / use abx Home

## 2019-02-22 ENCOUNTER — EMERGENCY (EMERGENCY)
Facility: HOSPITAL | Age: 84
LOS: 1 days | Discharge: HOME | End: 2019-02-22
Attending: EMERGENCY MEDICINE
Payer: MEDICARE

## 2019-02-22 VITALS
HEART RATE: 70 BPM | SYSTOLIC BLOOD PRESSURE: 227 MMHG | HEIGHT: 73 IN | WEIGHT: 149.91 LBS | DIASTOLIC BLOOD PRESSURE: 77 MMHG | RESPIRATION RATE: 15 BRPM | OXYGEN SATURATION: 95 %

## 2019-02-22 VITALS
OXYGEN SATURATION: 100 % | HEART RATE: 68 BPM | TEMPERATURE: 96 F | SYSTOLIC BLOOD PRESSURE: 171 MMHG | RESPIRATION RATE: 16 BRPM | DIASTOLIC BLOOD PRESSURE: 67 MMHG

## 2019-02-22 DIAGNOSIS — Z98.89 OTHER SPECIFIED POSTPROCEDURAL STATES: Chronic | ICD-10-CM

## 2019-02-22 DIAGNOSIS — Z95.1 PRESENCE OF AORTOCORONARY BYPASS GRAFT: Chronic | ICD-10-CM

## 2019-02-22 LAB
ALBUMIN SERPL ELPH-MCNC: 3.1 G/DL — LOW (ref 3.5–5)
ALP SERPL-CCNC: 100 U/L — SIGNIFICANT CHANGE UP (ref 40–120)
ALT FLD-CCNC: 18 U/L DA — SIGNIFICANT CHANGE UP (ref 10–60)
ANION GAP SERPL CALC-SCNC: 13 MMOL/L — SIGNIFICANT CHANGE UP (ref 5–17)
APTT BLD: 33.9 SEC — SIGNIFICANT CHANGE UP (ref 27.5–36.3)
AST SERPL-CCNC: 11 U/L — SIGNIFICANT CHANGE UP (ref 10–40)
BASOPHILS # BLD AUTO: 0.02 K/UL — SIGNIFICANT CHANGE UP (ref 0–0.2)
BASOPHILS NFR BLD AUTO: 0.3 % — SIGNIFICANT CHANGE UP (ref 0–2)
BILIRUB SERPL-MCNC: 0.2 MG/DL — SIGNIFICANT CHANGE UP (ref 0.2–1.2)
BUN SERPL-MCNC: 55 MG/DL — HIGH (ref 7–18)
CALCIUM SERPL-MCNC: 7.6 MG/DL — LOW (ref 8.4–10.5)
CHLORIDE SERPL-SCNC: 106 MMOL/L — SIGNIFICANT CHANGE UP (ref 96–108)
CO2 SERPL-SCNC: 16 MMOL/L — LOW (ref 22–31)
CREAT SERPL-MCNC: 3.42 MG/DL — HIGH (ref 0.5–1.3)
EOSINOPHIL # BLD AUTO: 0.09 K/UL — SIGNIFICANT CHANGE UP (ref 0–0.5)
EOSINOPHIL NFR BLD AUTO: 1.1 % — SIGNIFICANT CHANGE UP (ref 0–6)
GLUCOSE SERPL-MCNC: 282 MG/DL — HIGH (ref 70–99)
HCT VFR BLD CALC: 28.9 % — LOW (ref 39–50)
HGB BLD-MCNC: 9.4 G/DL — LOW (ref 13–17)
IMM GRANULOCYTES NFR BLD AUTO: 0.8 % — SIGNIFICANT CHANGE UP (ref 0–1.5)
INR BLD: 1 RATIO — SIGNIFICANT CHANGE UP (ref 0.88–1.16)
LACTATE SERPL-SCNC: 1.3 MMOL/L — SIGNIFICANT CHANGE UP (ref 0.7–2)
LIDOCAIN IGE QN: 44 U/L — LOW (ref 73–393)
LYMPHOCYTES # BLD AUTO: 1.2 K/UL — SIGNIFICANT CHANGE UP (ref 1–3.3)
LYMPHOCYTES # BLD AUTO: 15.2 % — SIGNIFICANT CHANGE UP (ref 13–44)
MCHC RBC-ENTMCNC: 30.5 PG — SIGNIFICANT CHANGE UP (ref 27–34)
MCHC RBC-ENTMCNC: 32.5 GM/DL — SIGNIFICANT CHANGE UP (ref 32–36)
MCV RBC AUTO: 93.8 FL — SIGNIFICANT CHANGE UP (ref 80–100)
MONOCYTES # BLD AUTO: 0.3 K/UL — SIGNIFICANT CHANGE UP (ref 0–0.9)
MONOCYTES NFR BLD AUTO: 3.8 % — SIGNIFICANT CHANGE UP (ref 2–14)
NEUTROPHILS # BLD AUTO: 6.21 K/UL — SIGNIFICANT CHANGE UP (ref 1.8–7.4)
NEUTROPHILS NFR BLD AUTO: 78.8 % — HIGH (ref 43–77)
NRBC # BLD: 0 /100 WBCS — SIGNIFICANT CHANGE UP (ref 0–0)
PLATELET # BLD AUTO: 108 K/UL — LOW (ref 150–400)
POTASSIUM SERPL-MCNC: 4.7 MMOL/L — SIGNIFICANT CHANGE UP (ref 3.5–5.3)
POTASSIUM SERPL-SCNC: 4.7 MMOL/L — SIGNIFICANT CHANGE UP (ref 3.5–5.3)
PROT SERPL-MCNC: 6.5 G/DL — SIGNIFICANT CHANGE UP (ref 6–8.3)
PROTHROM AB SERPL-ACNC: 11.1 SEC — SIGNIFICANT CHANGE UP (ref 10–12.9)
RBC # BLD: 3.08 M/UL — LOW (ref 4.2–5.8)
RBC # FLD: 14.6 % — HIGH (ref 10.3–14.5)
SODIUM SERPL-SCNC: 135 MMOL/L — SIGNIFICANT CHANGE UP (ref 135–145)
TROPONIN I SERPL-MCNC: <0.015 NG/ML — SIGNIFICANT CHANGE UP (ref 0–0.04)
WBC # BLD: 7.88 K/UL — SIGNIFICANT CHANGE UP (ref 3.8–10.5)
WBC # FLD AUTO: 7.88 K/UL — SIGNIFICANT CHANGE UP (ref 3.8–10.5)

## 2019-02-22 PROCEDURE — 93010 ELECTROCARDIOGRAM REPORT: CPT

## 2019-02-22 PROCEDURE — 82962 GLUCOSE BLOOD TEST: CPT

## 2019-02-22 PROCEDURE — 85610 PROTHROMBIN TIME: CPT

## 2019-02-22 PROCEDURE — 83690 ASSAY OF LIPASE: CPT

## 2019-02-22 PROCEDURE — 93005 ELECTROCARDIOGRAM TRACING: CPT

## 2019-02-22 PROCEDURE — 36415 COLL VENOUS BLD VENIPUNCTURE: CPT

## 2019-02-22 PROCEDURE — 84484 ASSAY OF TROPONIN QUANT: CPT

## 2019-02-22 PROCEDURE — 85730 THROMBOPLASTIN TIME PARTIAL: CPT

## 2019-02-22 PROCEDURE — 85027 COMPLETE CBC AUTOMATED: CPT

## 2019-02-22 PROCEDURE — 80053 COMPREHEN METABOLIC PANEL: CPT

## 2019-02-22 PROCEDURE — 99283 EMERGENCY DEPT VISIT LOW MDM: CPT | Mod: 25

## 2019-02-22 PROCEDURE — 71045 X-RAY EXAM CHEST 1 VIEW: CPT

## 2019-02-22 PROCEDURE — 96361 HYDRATE IV INFUSION ADD-ON: CPT

## 2019-02-22 PROCEDURE — 71045 X-RAY EXAM CHEST 1 VIEW: CPT | Mod: 26

## 2019-02-22 PROCEDURE — 96360 HYDRATION IV INFUSION INIT: CPT

## 2019-02-22 PROCEDURE — 83605 ASSAY OF LACTIC ACID: CPT

## 2019-02-22 PROCEDURE — 99284 EMERGENCY DEPT VISIT MOD MDM: CPT

## 2019-02-22 RX ORDER — DEXTROSE 50 % IN WATER 50 %
50 SYRINGE (ML) INTRAVENOUS ONCE
Qty: 0 | Refills: 0 | Status: COMPLETED | OUTPATIENT
Start: 2019-02-22 | End: 2019-02-22

## 2019-02-22 RX ORDER — SODIUM CHLORIDE 9 MG/ML
1000 INJECTION INTRAMUSCULAR; INTRAVENOUS; SUBCUTANEOUS ONCE
Qty: 0 | Refills: 0 | Status: COMPLETED | OUTPATIENT
Start: 2019-02-22 | End: 2019-02-22

## 2019-02-22 RX ADMIN — SODIUM CHLORIDE 1000 MILLILITER(S): 9 INJECTION INTRAMUSCULAR; INTRAVENOUS; SUBCUTANEOUS at 21:26

## 2019-02-22 RX ADMIN — Medication 50 MILLILITER(S): at 21:26

## 2019-02-22 RX ADMIN — SODIUM CHLORIDE 1000 MILLILITER(S): 9 INJECTION INTRAMUSCULAR; INTRAVENOUS; SUBCUTANEOUS at 23:04

## 2019-02-22 NOTE — ED PROVIDER NOTE - ATTESTATION, MLM
[Passed] : passed [Brushes teeth with help] : brushes teeth with help [Feeds doll] : feeds doll [Removes garments] : removes garments [Uses spoon/fork] : uses spoon/fork [Laughs with others] : laughs with others [Scribbles] : scribbles  [Drinks from cup without spilling] : drinks from cup without spilling [Speech half understandable] : speech half understandable [Combines words] : combines words [Points to pictures] : points to pictures [Understands 2 step commands] : understands 2 step commands [Says 5-10 words] : says 5-10 words [Says >10 words] : says >10 words [Points to 1 body part] : points to 1 body part [Throws ball overhead] : throws ball overhead [Kicks ball forward] : kicks ball forward [Walks up steps] : walks up steps [Runs] : runs I have reviewed and confirmed nurses' notes for patient's medications, allergies, medical history, and surgical history.

## 2019-02-22 NOTE — ED PROVIDER NOTE - PROGRESS NOTE DETAILS
Patient remains AOx4. Blood sugar remains controlled. Discussed care with patients PCP. Will DC home and have patient follow up with PCP. Patient and PCP in agreement with this plan.

## 2019-02-22 NOTE — ED ADULT NURSE NOTE - OBJECTIVE STATEMENT
pt is here for hypoglycemia.  Notification by EMS, as per family member, took Lantus in the morning and stated feeling no good, later unresponsive, denied pain or sob, denied N/V/D, pt calm at this time.

## 2019-02-22 NOTE — ED PROVIDER NOTE - OBJECTIVE STATEMENT
89 y/o M with past hx of DM, Danish speaking, presents with hypoglycemia and AMS. Pt's wife states that pt was given Lantis 50 units this morning and was well throughout the day, ate lunch as well as dinner, but was feeling unwell and had decreased consciousness along with low blood sugar this evening. EMS was called who checked pt's blood sugar which was found to be 38. One tube of oral glucose was given by EMS. Pt was AMS secondary to hypoglycemia, was placed on breather, was saturating well, was given 2 amps 50% dextrose and had immediate improvement in mental status. Pt was then A&Ox4 with no other acute complaints. He denies CP, SOB, abdominal pain, changes in vision, headache, lightheadedness and is otherwise baseline without any other complaints. 89 y/o M with past hx of DM, Polish speaking, presents with hypoglycemia and AMS. Pt's wife states that pt was given Lantis 50 units this morning and was well throughout the day, ate lunch as well as dinner, but was feeling unwell and had decreased consciousness along with low blood sugar this evening. EMS was called who checked pt's blood sugar which was found to be 38. One tube of oral glucose was given by EMS. Pt was AMS secondary to hypoglycemia, was placed on breather, was saturating well, was given 2 amps 50% dextrose and had immediate improvement in mental status. Pt was then A&Ox4 with no other acute complaints. He denies CP, SOB, abdominal pain, changes in vision, headache, lightheadedness and is otherwise baseline without any other complaints.  Polish  [id number], Jyotsna

## 2019-02-22 NOTE — ED PROVIDER NOTE - CLINICAL SUMMARY MEDICAL DECISION MAKING FREE TEXT BOX
Pt with hypoglycemia secondary to lantis: Will get cardiac workup, CXR, monitor blood sugar, and reevaluate

## 2019-02-25 PROBLEM — E78.5 HYPERLIPIDEMIA, UNSPECIFIED: Chronic | Status: ACTIVE | Noted: 2018-03-29

## 2019-02-25 PROBLEM — S72.002A FRACTURE OF UNSPECIFIED PART OF NECK OF LEFT FEMUR, INITIAL ENCOUNTER FOR CLOSED FRACTURE: Chronic | Status: ACTIVE | Noted: 2018-03-29

## 2019-08-05 ENCOUNTER — EMERGENCY (EMERGENCY)
Facility: HOSPITAL | Age: 84
LOS: 1 days | Discharge: HOME | End: 2019-08-05
Attending: EMERGENCY MEDICINE
Payer: MEDICARE

## 2019-08-05 VITALS
SYSTOLIC BLOOD PRESSURE: 111 MMHG | HEART RATE: 61 BPM | DIASTOLIC BLOOD PRESSURE: 56 MMHG | HEIGHT: 66 IN | OXYGEN SATURATION: 100 % | RESPIRATION RATE: 20 BRPM | TEMPERATURE: 97 F | WEIGHT: 132.06 LBS

## 2019-08-05 VITALS
TEMPERATURE: 98 F | OXYGEN SATURATION: 100 % | SYSTOLIC BLOOD PRESSURE: 121 MMHG | HEART RATE: 68 BPM | RESPIRATION RATE: 19 BRPM | DIASTOLIC BLOOD PRESSURE: 65 MMHG

## 2019-08-05 DIAGNOSIS — Z95.1 PRESENCE OF AORTOCORONARY BYPASS GRAFT: Chronic | ICD-10-CM

## 2019-08-05 DIAGNOSIS — Z98.89 OTHER SPECIFIED POSTPROCEDURAL STATES: Chronic | ICD-10-CM

## 2019-08-05 PROCEDURE — 99283 EMERGENCY DEPT VISIT LOW MDM: CPT

## 2019-08-05 PROCEDURE — 99283 EMERGENCY DEPT VISIT LOW MDM: CPT | Mod: 25

## 2019-08-05 PROCEDURE — 73502 X-RAY EXAM HIP UNI 2-3 VIEWS: CPT | Mod: 26,RT

## 2019-08-05 PROCEDURE — 73502 X-RAY EXAM HIP UNI 2-3 VIEWS: CPT

## 2019-08-05 NOTE — ED ADULT TRIAGE NOTE - BP NONINVASIVE DIASTOLIC (MM HG)
Next appt 6-3-19  Last appt 12-3-18    Refill request for   Disp Refills Start End    HYDROcodone-acetaminophen (NORCO) 5-325 MG per tablet 30 tablet 0 11/1/2018     Sig - Route: Take 1 tablet by mouth nightly as needed for Pain (sciatica). - Oral      Refill unable to be completed per standing protocol due to; NON PROTOCOL MEDICATION.  Orders pended, and routed to provider for approval.   Please route any notes back to your nursing pool via patient call NOT Rx Auth.  Thank you, Refill Center Staff    
Patient requests refill of HYDROcodone-acetaminophen (NORCO) 5-325 MG per tablet  Patient will have the prescription filled at [id number].  Patient was advised to bring photo ID and if they select another party to  prescription they will need a photo ID, along with hours and location of pharmacy.      
56

## 2019-08-05 NOTE — ED PROVIDER NOTE - PROGRESS NOTE DETAILS
Spoke with PMD will f/u in office. Pt is well appearing walking with steady gait, stable for discharge and follow up without fail with medical doctor. I had a detailed discussion with the patient and/or guardian regarding the historical points, exam findings, and any diagnostic results supporting the discharge diagnosis. Pt educated on care and need for follow up. Strict return instructions and red flag signs and symptoms discussed with patient. Questions answered. Pt shows understanding of discharge information and agrees to follow.

## 2019-08-05 NOTE — ED PROVIDER NOTE - OBJECTIVE STATEMENT
89 y/o M pt with no significant PMHx and significant PSHx of hemiathroplasty presents to the ED with c/o waxing and waning hip pain for few days. Pt reports that broke his hip 1 year ago and was walk to cane. Pt states that he was stretching his leg and pain occurred. Pt reports talking to Dr. Hoyos and told him to come to the ED.

## 2019-08-05 NOTE — ED ADULT NURSE NOTE - OBJECTIVE STATEMENT
c/o right hip pain for a few days .Ambulates with a walker .Denies recent fall or injury. hx or right hip replacement about a year ago.

## 2019-08-05 NOTE — ED PROVIDER NOTE - CLINICAL SUMMARY MEDICAL DECISION MAKING FREE TEXT BOX
89 y/o M patient presents to the ED with c/o hip pain for few days history of instrumentation. Will do XR, speak with orthopedic surgeon and reassess.

## 2019-10-20 ENCOUNTER — EMERGENCY (EMERGENCY)
Facility: HOSPITAL | Age: 84
LOS: 1 days | Discharge: LEFT BEFORE DISCHARGE | End: 2019-10-20
Attending: EMERGENCY MEDICINE
Payer: MEDICARE

## 2019-10-20 VITALS
DIASTOLIC BLOOD PRESSURE: 56 MMHG | OXYGEN SATURATION: 99 % | HEART RATE: 64 BPM | RESPIRATION RATE: 20 BRPM | TEMPERATURE: 97 F | SYSTOLIC BLOOD PRESSURE: 164 MMHG | WEIGHT: 132.06 LBS | HEIGHT: 66 IN

## 2019-10-20 DIAGNOSIS — Z95.1 PRESENCE OF AORTOCORONARY BYPASS GRAFT: Chronic | ICD-10-CM

## 2019-10-20 DIAGNOSIS — Z98.89 OTHER SPECIFIED POSTPROCEDURAL STATES: Chronic | ICD-10-CM

## 2019-10-20 PROCEDURE — 99283 EMERGENCY DEPT VISIT LOW MDM: CPT

## 2019-10-20 PROCEDURE — 82962 GLUCOSE BLOOD TEST: CPT

## 2019-10-20 NOTE — ED ADULT NURSE NOTE - OBJECTIVE STATEMENT
BIB EMS for Hypoglycemia this morning on arrival alert and verbally responsive denies any discomfort at present. BIB EMS for Hypoglycemia this morning on arrival alert and verbally responsive denies any discomfort at present. Pt axox3 ambulatory with cane gait steady family @bed side.

## 2019-10-20 NOTE — ED PROVIDER NOTE - CLINICAL SUMMARY MEDICAL DECISION MAKING FREE TEXT BOX
89 yo male presents s/p syncope. Patient is ambulatory with steady gait and negative physical exam. Patient states she does not want to be worked up at this time and is requesting discharge home. 89 yo male presents s/p syncope. Patient is ambulatory with steady gait and negative physical exam. Patient states she does not want to be worked up at this time and is requesting discharge home. awake alert oriented, I explained I wanted to do EKG and labs and observe patient for a while to make sure glucose doesn't drop but patient declined.

## 2019-10-20 NOTE — ED PROVIDER NOTE - OBJECTIVE STATEMENT
89 yo male with PMHx of DM, CABG, HLD, HTN, left hip fracture, presents s/p syncope today. Patient reports taking his morning dose of Lantus and eating breakfast as usual at which time patient began to feel lightheaded. At that time the patient asked his home attendant to bring him something sweet so he was given a small amount of ice cream and then patient became unresponsive. Per family, the home attendant called the son who called [facility]. The patient's blood sugar was measured but the level is unknown. Pt was given oral glucose and then came back to baseline. Pt denies chest pain, weakness, numbness, fever, chills. Patient states he is asymptomatic while in the ED and is requesting discharge home. 89 yo male with PMHx of DM, CABG, HLD, HTN, left hip fracture, presents s/p syncope today. Patient reports taking his morning dose of Lantus and eating breakfast as usual at which time patient began to feel lightheaded. At that time the patient asked his home attendant to bring him something sweet so he was given a small amount of ice cream and then patient became unresponsive. Per family, the home attendant called the son who called [facility]. The patient's blood sugar was measured but the level is unknown. Pt was given oral glucose and then came back to baseline. Pt denies chest pain, weakness, numbness, fever, chills. Patient states he is asymptomatic while in the ED and is requesting discharge home. according to triage, EMS said glucose was in 40s, they gave him 2 orange juices and he went back to normal.

## 2019-11-07 NOTE — ED ADULT TRIAGE NOTE - BMI (KG/M2)
Assessment complete. Alert and oriented. Verbal. Denies any pain or discomfort
at this time. NPO until consult with cardiologist. Medications given without
issue with small sips of water. Call light within reach. 21.7

## 2019-12-10 ENCOUNTER — INPATIENT (INPATIENT)
Facility: HOSPITAL | Age: 84
LOS: 0 days | DRG: 283 | End: 2019-12-11
Attending: SURGERY | Admitting: SURGERY
Payer: MEDICARE

## 2019-12-10 VITALS
TEMPERATURE: 98 F | WEIGHT: 132.06 LBS | HEART RATE: 84 BPM | OXYGEN SATURATION: 96 % | RESPIRATION RATE: 20 BRPM | HEIGHT: 66 IN | SYSTOLIC BLOOD PRESSURE: 116 MMHG | DIASTOLIC BLOOD PRESSURE: 62 MMHG

## 2019-12-10 DIAGNOSIS — E87.5 HYPERKALEMIA: ICD-10-CM

## 2019-12-10 DIAGNOSIS — I25.10 ATHEROSCLEROTIC HEART DISEASE OF NATIVE CORONARY ARTERY WITHOUT ANGINA PECTORIS: ICD-10-CM

## 2019-12-10 DIAGNOSIS — N17.9 ACUTE KIDNEY FAILURE, UNSPECIFIED: ICD-10-CM

## 2019-12-10 DIAGNOSIS — E11.9 TYPE 2 DIABETES MELLITUS WITHOUT COMPLICATIONS: ICD-10-CM

## 2019-12-10 DIAGNOSIS — Z29.9 ENCOUNTER FOR PROPHYLACTIC MEASURES, UNSPECIFIED: ICD-10-CM

## 2019-12-10 DIAGNOSIS — E78.5 HYPERLIPIDEMIA, UNSPECIFIED: ICD-10-CM

## 2019-12-10 DIAGNOSIS — Z95.1 PRESENCE OF AORTOCORONARY BYPASS GRAFT: Chronic | ICD-10-CM

## 2019-12-10 DIAGNOSIS — I24.9 ACUTE ISCHEMIC HEART DISEASE, UNSPECIFIED: ICD-10-CM

## 2019-12-10 DIAGNOSIS — I50.9 HEART FAILURE, UNSPECIFIED: ICD-10-CM

## 2019-12-10 DIAGNOSIS — I10 ESSENTIAL (PRIMARY) HYPERTENSION: ICD-10-CM

## 2019-12-10 DIAGNOSIS — N40.0 BENIGN PROSTATIC HYPERPLASIA WITHOUT LOWER URINARY TRACT SYMPTOMS: ICD-10-CM

## 2019-12-10 DIAGNOSIS — Z98.89 OTHER SPECIFIED POSTPROCEDURAL STATES: Chronic | ICD-10-CM

## 2019-12-10 LAB
ALBUMIN SERPL ELPH-MCNC: 3.1 G/DL — LOW (ref 3.5–5)
ALP SERPL-CCNC: 132 U/L — HIGH (ref 40–120)
ALT FLD-CCNC: 22 U/L DA — SIGNIFICANT CHANGE UP (ref 10–60)
ANION GAP SERPL CALC-SCNC: 8 MMOL/L — SIGNIFICANT CHANGE UP (ref 5–17)
ANION GAP SERPL CALC-SCNC: 8 MMOL/L — SIGNIFICANT CHANGE UP (ref 5–17)
APTT BLD: 30.7 SEC — SIGNIFICANT CHANGE UP (ref 27.5–36.3)
AST SERPL-CCNC: 20 U/L — SIGNIFICANT CHANGE UP (ref 10–40)
BASOPHILS # BLD AUTO: 0.03 K/UL — SIGNIFICANT CHANGE UP (ref 0–0.2)
BASOPHILS NFR BLD AUTO: 0.4 % — SIGNIFICANT CHANGE UP (ref 0–2)
BILIRUB SERPL-MCNC: 0.3 MG/DL — SIGNIFICANT CHANGE UP (ref 0.2–1.2)
BUN SERPL-MCNC: 71 MG/DL — HIGH (ref 7–18)
BUN SERPL-MCNC: 76 MG/DL — HIGH (ref 7–18)
CALCIUM SERPL-MCNC: 8.3 MG/DL — LOW (ref 8.4–10.5)
CALCIUM SERPL-MCNC: 8.5 MG/DL — SIGNIFICANT CHANGE UP (ref 8.4–10.5)
CHLORIDE SERPL-SCNC: 112 MMOL/L — HIGH (ref 96–108)
CHLORIDE SERPL-SCNC: 115 MMOL/L — HIGH (ref 96–108)
CO2 SERPL-SCNC: 16 MMOL/L — LOW (ref 22–31)
CO2 SERPL-SCNC: 16 MMOL/L — LOW (ref 22–31)
CREAT SERPL-MCNC: 4.27 MG/DL — HIGH (ref 0.5–1.3)
CREAT SERPL-MCNC: 4.3 MG/DL — HIGH (ref 0.5–1.3)
EOSINOPHIL # BLD AUTO: 0.15 K/UL — SIGNIFICANT CHANGE UP (ref 0–0.5)
EOSINOPHIL NFR BLD AUTO: 1.8 % — SIGNIFICANT CHANGE UP (ref 0–6)
GLUCOSE SERPL-MCNC: 149 MG/DL — HIGH (ref 70–99)
GLUCOSE SERPL-MCNC: 218 MG/DL — HIGH (ref 70–99)
HCT VFR BLD CALC: 29 % — LOW (ref 39–50)
HGB BLD-MCNC: 9.1 G/DL — LOW (ref 13–17)
IMM GRANULOCYTES NFR BLD AUTO: 0.7 % — SIGNIFICANT CHANGE UP (ref 0–1.5)
INR BLD: 1.04 RATIO — SIGNIFICANT CHANGE UP (ref 0.88–1.16)
LYMPHOCYTES # BLD AUTO: 1.61 K/UL — SIGNIFICANT CHANGE UP (ref 1–3.3)
LYMPHOCYTES # BLD AUTO: 19.6 % — SIGNIFICANT CHANGE UP (ref 13–44)
MCHC RBC-ENTMCNC: 30.2 PG — SIGNIFICANT CHANGE UP (ref 27–34)
MCHC RBC-ENTMCNC: 31.4 GM/DL — LOW (ref 32–36)
MCV RBC AUTO: 96.3 FL — SIGNIFICANT CHANGE UP (ref 80–100)
MONOCYTES # BLD AUTO: 0.6 K/UL — SIGNIFICANT CHANGE UP (ref 0–0.9)
MONOCYTES NFR BLD AUTO: 7.3 % — SIGNIFICANT CHANGE UP (ref 2–14)
NEUTROPHILS # BLD AUTO: 5.76 K/UL — SIGNIFICANT CHANGE UP (ref 1.8–7.4)
NEUTROPHILS NFR BLD AUTO: 70.2 % — SIGNIFICANT CHANGE UP (ref 43–77)
NRBC # BLD: 0 /100 WBCS — SIGNIFICANT CHANGE UP (ref 0–0)
NT-PROBNP SERPL-SCNC: HIGH PG/ML (ref 0–450)
PLATELET # BLD AUTO: 159 K/UL — SIGNIFICANT CHANGE UP (ref 150–400)
POTASSIUM SERPL-MCNC: 5.7 MMOL/L — HIGH (ref 3.5–5.3)
POTASSIUM SERPL-MCNC: 6 MMOL/L — HIGH (ref 3.5–5.3)
POTASSIUM SERPL-SCNC: 5.7 MMOL/L — HIGH (ref 3.5–5.3)
POTASSIUM SERPL-SCNC: 6 MMOL/L — HIGH (ref 3.5–5.3)
PROT SERPL-MCNC: 6.4 G/DL — SIGNIFICANT CHANGE UP (ref 6–8.3)
PROTHROM AB SERPL-ACNC: 11.6 SEC — SIGNIFICANT CHANGE UP (ref 10–12.9)
RBC # BLD: 3.01 M/UL — LOW (ref 4.2–5.8)
RBC # FLD: 14.7 % — HIGH (ref 10.3–14.5)
SODIUM SERPL-SCNC: 136 MMOL/L — SIGNIFICANT CHANGE UP (ref 135–145)
SODIUM SERPL-SCNC: 139 MMOL/L — SIGNIFICANT CHANGE UP (ref 135–145)
TROPONIN I SERPL-MCNC: 2 NG/ML — HIGH (ref 0–0.04)
TROPONIN I SERPL-MCNC: 2.46 NG/ML — HIGH (ref 0–0.04)
WBC # BLD: 8.21 K/UL — SIGNIFICANT CHANGE UP (ref 3.8–10.5)
WBC # FLD AUTO: 8.21 K/UL — SIGNIFICANT CHANGE UP (ref 3.8–10.5)

## 2019-12-10 PROCEDURE — 71046 X-RAY EXAM CHEST 2 VIEWS: CPT | Mod: 26

## 2019-12-10 PROCEDURE — 99285 EMERGENCY DEPT VISIT HI MDM: CPT

## 2019-12-10 RX ORDER — ALBUTEROL 90 UG/1
2.5 AEROSOL, METERED ORAL ONCE
Refills: 0 | Status: COMPLETED | OUTPATIENT
Start: 2019-12-10 | End: 2019-12-10

## 2019-12-10 RX ORDER — INSULIN HUMAN 100 [IU]/ML
10 INJECTION, SOLUTION SUBCUTANEOUS ONCE
Refills: 0 | Status: COMPLETED | OUTPATIENT
Start: 2019-12-10 | End: 2019-12-10

## 2019-12-10 RX ORDER — CLOPIDOGREL BISULFATE 75 MG/1
75 TABLET, FILM COATED ORAL DAILY
Refills: 0 | Status: DISCONTINUED | OUTPATIENT
Start: 2019-12-10 | End: 2019-12-11

## 2019-12-10 RX ORDER — DEXTROSE 50 % IN WATER 50 %
15 SYRINGE (ML) INTRAVENOUS ONCE
Refills: 0 | Status: DISCONTINUED | OUTPATIENT
Start: 2019-12-10 | End: 2019-12-11

## 2019-12-10 RX ORDER — AMLODIPINE BESYLATE 2.5 MG/1
1 TABLET ORAL
Qty: 0 | Refills: 0 | DISCHARGE

## 2019-12-10 RX ORDER — INSULIN GLARGINE 100 [IU]/ML
40 INJECTION, SOLUTION SUBCUTANEOUS
Qty: 0 | Refills: 0 | DISCHARGE

## 2019-12-10 RX ORDER — ASPIRIN/CALCIUM CARB/MAGNESIUM 324 MG
81 TABLET ORAL DAILY
Refills: 0 | Status: DISCONTINUED | OUTPATIENT
Start: 2019-12-10 | End: 2019-12-11

## 2019-12-10 RX ORDER — INSULIN HUMAN 100 [IU]/ML
5 INJECTION, SOLUTION SUBCUTANEOUS ONCE
Refills: 0 | Status: COMPLETED | OUTPATIENT
Start: 2019-12-10 | End: 2019-12-10

## 2019-12-10 RX ORDER — DEXTROSE 50 % IN WATER 50 %
50 SYRINGE (ML) INTRAVENOUS ONCE
Refills: 0 | Status: COMPLETED | OUTPATIENT
Start: 2019-12-10 | End: 2019-12-10

## 2019-12-10 RX ORDER — HYDRALAZINE HCL 50 MG
10 TABLET ORAL EVERY 8 HOURS
Refills: 0 | Status: DISCONTINUED | OUTPATIENT
Start: 2019-12-10 | End: 2019-12-11

## 2019-12-10 RX ORDER — CITALOPRAM 10 MG/1
20 TABLET, FILM COATED ORAL DAILY
Refills: 0 | Status: DISCONTINUED | OUTPATIENT
Start: 2019-12-10 | End: 2019-12-11

## 2019-12-10 RX ORDER — DEXTROSE 50 % IN WATER 50 %
25 SYRINGE (ML) INTRAVENOUS ONCE
Refills: 0 | Status: COMPLETED | OUTPATIENT
Start: 2019-12-10 | End: 2019-12-10

## 2019-12-10 RX ORDER — INSULIN LISPRO 100/ML
VIAL (ML) SUBCUTANEOUS
Refills: 0 | Status: DISCONTINUED | OUTPATIENT
Start: 2019-12-10 | End: 2019-12-11

## 2019-12-10 RX ORDER — CALCIUM GLUCONATE 100 MG/ML
1 VIAL (ML) INTRAVENOUS ONCE
Refills: 0 | Status: COMPLETED | OUTPATIENT
Start: 2019-12-10 | End: 2019-12-10

## 2019-12-10 RX ORDER — HEPARIN SODIUM 5000 [USP'U]/ML
5000 INJECTION INTRAVENOUS; SUBCUTANEOUS EVERY 12 HOURS
Refills: 0 | Status: DISCONTINUED | OUTPATIENT
Start: 2019-12-10 | End: 2019-12-11

## 2019-12-10 RX ORDER — INSULIN GLARGINE 100 [IU]/ML
25 INJECTION, SOLUTION SUBCUTANEOUS AT BEDTIME
Refills: 0 | Status: DISCONTINUED | OUTPATIENT
Start: 2019-12-10 | End: 2019-12-10

## 2019-12-10 RX ORDER — ISOSORBIDE DINITRATE 5 MG/1
10 TABLET ORAL THREE TIMES A DAY
Refills: 0 | Status: DISCONTINUED | OUTPATIENT
Start: 2019-12-10 | End: 2019-12-11

## 2019-12-10 RX ORDER — FUROSEMIDE 40 MG
40 TABLET ORAL ONCE
Refills: 0 | Status: COMPLETED | OUTPATIENT
Start: 2019-12-10 | End: 2019-12-10

## 2019-12-10 RX ORDER — CALCIUM GLUCONATE 100 MG/ML
2 VIAL (ML) INTRAVENOUS ONCE
Refills: 0 | Status: COMPLETED | OUTPATIENT
Start: 2019-12-10 | End: 2019-12-10

## 2019-12-10 RX ORDER — POLYETHYLENE GLYCOL 3350 17 G/17G
17 POWDER, FOR SOLUTION ORAL DAILY
Refills: 0 | Status: DISCONTINUED | OUTPATIENT
Start: 2019-12-10 | End: 2019-12-11

## 2019-12-10 RX ORDER — OMEPRAZOLE 10 MG/1
1 CAPSULE, DELAYED RELEASE ORAL
Qty: 0 | Refills: 0 | DISCHARGE

## 2019-12-10 RX ORDER — SENNA PLUS 8.6 MG/1
1 TABLET ORAL
Qty: 0 | Refills: 0 | DISCHARGE

## 2019-12-10 RX ORDER — PANTOPRAZOLE SODIUM 20 MG/1
40 TABLET, DELAYED RELEASE ORAL DAILY
Refills: 0 | Status: DISCONTINUED | OUTPATIENT
Start: 2019-12-10 | End: 2019-12-11

## 2019-12-10 RX ORDER — TAMSULOSIN HYDROCHLORIDE 0.4 MG/1
0.4 CAPSULE ORAL AT BEDTIME
Refills: 0 | Status: DISCONTINUED | OUTPATIENT
Start: 2019-12-10 | End: 2019-12-11

## 2019-12-10 RX ORDER — ROSUVASTATIN CALCIUM 5 MG/1
1 TABLET ORAL
Qty: 0 | Refills: 0 | DISCHARGE

## 2019-12-10 RX ORDER — FERROUS SULFATE 325(65) MG
1 TABLET ORAL
Qty: 0 | Refills: 0 | DISCHARGE

## 2019-12-10 RX ORDER — FINASTERIDE 5 MG/1
5 TABLET, FILM COATED ORAL DAILY
Refills: 0 | Status: DISCONTINUED | OUTPATIENT
Start: 2019-12-10 | End: 2019-12-11

## 2019-12-10 RX ORDER — ZOLPIDEM TARTRATE 10 MG/1
1 TABLET ORAL
Qty: 0 | Refills: 0 | DISCHARGE

## 2019-12-10 RX ORDER — ATORVASTATIN CALCIUM 80 MG/1
20 TABLET, FILM COATED ORAL AT BEDTIME
Refills: 0 | Status: DISCONTINUED | OUTPATIENT
Start: 2019-12-10 | End: 2019-12-11

## 2019-12-10 RX ORDER — GLIMEPIRIDE 1 MG
1 TABLET ORAL
Qty: 0 | Refills: 0 | DISCHARGE

## 2019-12-10 RX ORDER — CITALOPRAM 10 MG/1
1 TABLET, FILM COATED ORAL
Qty: 0 | Refills: 0 | DISCHARGE

## 2019-12-10 RX ORDER — DEXTROSE 50 % IN WATER 50 %
25 SYRINGE (ML) INTRAVENOUS ONCE
Refills: 0 | Status: DISCONTINUED | OUTPATIENT
Start: 2019-12-10 | End: 2019-12-11

## 2019-12-10 RX ORDER — CARVEDILOL PHOSPHATE 80 MG/1
3.12 CAPSULE, EXTENDED RELEASE ORAL EVERY 12 HOURS
Refills: 0 | Status: DISCONTINUED | OUTPATIENT
Start: 2019-12-10 | End: 2019-12-11

## 2019-12-10 RX ORDER — SODIUM POLYSTYRENE SULFONATE 4.1 MEQ/G
30 POWDER, FOR SUSPENSION ORAL ONCE
Refills: 0 | Status: COMPLETED | OUTPATIENT
Start: 2019-12-10 | End: 2019-12-10

## 2019-12-10 RX ADMIN — Medication 100 GRAM(S): at 19:21

## 2019-12-10 RX ADMIN — ALBUTEROL 2.5 MILLIGRAM(S): 90 AEROSOL, METERED ORAL at 17:57

## 2019-12-10 RX ADMIN — Medication 40 MILLIGRAM(S): at 17:57

## 2019-12-10 RX ADMIN — INSULIN HUMAN 10 UNIT(S): 100 INJECTION, SOLUTION SUBCUTANEOUS at 12:45

## 2019-12-10 RX ADMIN — ALBUTEROL 2.5 MILLIGRAM(S): 90 AEROSOL, METERED ORAL at 19:47

## 2019-12-10 RX ADMIN — Medication 50 MILLILITER(S): at 19:49

## 2019-12-10 RX ADMIN — Medication 25 MILLILITER(S): at 12:44

## 2019-12-10 RX ADMIN — INSULIN HUMAN 5 UNIT(S): 100 INJECTION, SOLUTION SUBCUTANEOUS at 19:46

## 2019-12-10 RX ADMIN — Medication 200 GRAM(S): at 12:44

## 2019-12-10 RX ADMIN — HEPARIN SODIUM 5000 UNIT(S): 5000 INJECTION INTRAVENOUS; SUBCUTANEOUS at 18:38

## 2019-12-10 RX ADMIN — CARVEDILOL PHOSPHATE 3.12 MILLIGRAM(S): 80 CAPSULE, EXTENDED RELEASE ORAL at 18:38

## 2019-12-10 NOTE — ED PROVIDER NOTE - PROGRESS NOTE DETAILS
elevated trop, ekg unchange, noted ckd, treating for hyperkalemia, discussed with dr figueroa, will admit. dr sow informed of patient to be consulted for nstemi.

## 2019-12-10 NOTE — H&P ADULT - PROBLEM SELECTOR PLAN 6
Pt is on Ranexa, isosorbide and atenolol   Holding atenolol due to acute drop in renal function  c/w coreg, isodril and hydralazine

## 2019-12-10 NOTE — ED PROVIDER NOTE - CARE PLAN
Principal Discharge DX:	Hyperkalemia  Secondary Diagnosis:	NSTEMI (non-ST elevated myocardial infarction)

## 2019-12-10 NOTE — H&P ADULT - PROBLEM SELECTOR PLAN 2
Pt appears in mild shortness of breath with elevated bnp and chest congestion  s/p lasix 40 mg in ED  c/w lasix 40mg daily  f/u Dr Madden recommendations

## 2019-12-10 NOTE — H&P ADULT - ATTENDING COMMENTS
89 y/o m admitted with 4-5 d hx sob-no cp/cough/fever. Never smoker  PMH; cad-cabg 10 yrs ago, ckd-creat in 3 range previously, htn,hld,bph,dm  Findings; elevated troponin,bnp with ekg changes. Acute on chronic ckd-hyperkalemia,echo revealing EF-10-15%,grade 4 diastolic dysfct and moderate pulm htn  Plan; cardio eval,lasix,nitrates,tele,heparin 89 y/o m admitted with 4-5 d hx sob-no cp/cough/fever. Never smoker  PMH; cad-cabg 10 yrs ago, ckd-creat in 3 range previously, htn,hld,bph,dm  Findings; elevated troponin,bnp with ekg changes. Acute on chronic ckd-hyperkalemia,echo revealing EF-10-15%,grade 4 diastolic dysfct and moderate pulm htn  Plan; cardio eval,lasix,nitrates,tele,heparin,coreg,hydralazine

## 2019-12-10 NOTE — H&P ADULT - HISTORY OF PRESENT ILLNESS
Pt is a 89 yr old male from home with pmh of HTN, CAD, DM, Quadruple bypass 10 yrs ago, CAD who came in with chest pain and shortness of breath for 4-5 days. Pt is a 89 yr old male from home with pmh of HTN, CAD, DM, Quadruple bypass 10 yrs ago, CAD who came in with chest pain and shortness of breath for 4-5 days. Pt states that he has been worsening breathing difficulty for last few days and today on climbing stair he became very short of breath and was brought to ED. Pt also states that he has been having episodic chest pain , pressure like, no radiation or relation with movement. Pt denies any headache, syncope, abd pain, nausea, vomiting, diarhea.

## 2019-12-10 NOTE — H&P ADULT - PROBLEM SELECTOR PLAN 4
Pt baseline Cr around 3  s/p lasix 40mg  Monitor urine output and electrolytes  f/u urine lytes  Nephro Dr Madden consulted

## 2019-12-10 NOTE — ED PROVIDER NOTE - CLINICAL SUMMARY MEDICAL DECISION MAKING FREE TEXT BOX
Patient presenting with cp and sob on exertion. concern for acs vs chf vs stable angina. will obtain lab, xray, ed obs and reassess. hr normal, saturation normal, no calf pain to suggest dvt or pe

## 2019-12-10 NOTE — H&P ADULT - PROBLEM SELECTOR PLAN 1
Pt came in with chest pain and sob  T1 2, T2 2.4, f/u T3  EKG show St depression in 1, aVL, v1 & V2  F/U Echocardiogram  Dr Johnson consulted  Pt started on coreg, isodril and hydralazine

## 2019-12-10 NOTE — ED ADULT NURSE NOTE - OBJECTIVE STATEMENT
Pt requested his son to interpret, states has dyspnea on exertion x 1 week, not improving  Denies cough, fever, chills, chest pain, nausea, vomiting, headache or dizziness

## 2019-12-10 NOTE — H&P ADULT - PROBLEM SELECTOR PLAN 3
Pt came in with K of 6 with EKG changes  H/o CKD appears in LOBITO on CKD  s/p hyperkalemia cocktail x2 and Kayexalate 30mg  f/u K level   f/u Dr Madden recommendations

## 2019-12-10 NOTE — H&P ADULT - NSHPPHYSICALEXAM_GEN_ALL_CORE
PHYSICAL EXAM:  GENERAL: NAD, speaks in full sentences, no signs of respiratory distress  HEAD:  Atraumatic, Normocephalic  EYES: EOMI, PERRLA, conjunctiva and sclera clear  NECK: Supple, No JVD  CHEST/LUNG: Clear to auscultation bilaterally; No wheeze; No crackles; No accessory muscles used  HEART: Regular rate and rhythm; No murmurs;   ABDOMEN: Soft, Nontender, Nondistended; Bowel sounds present; No guarding  EXTREMITIES:  2+ Peripheral Pulses, No cyanosis or edema  PSYCH: AAOx3  NEUROLOGY: non-focal  SKIN: No rashes or lesions PHYSICAL EXAM:  GENERAL: NAD, speaks in full sentences, no signs of respiratory distress  HEAD:  Atraumatic, Normocephalic  EYES: EOMI, PERRLA, conjunctiva and sclera clear  NECK: Supple, No JVD  CHEST/LUNG:b/l basal crackles  HEART: Regular rate and rhythm; No murmurs;   ABDOMEN: Soft, Nontender, Nondistended; Bowel sounds present; No guarding  EXTREMITIES:  2+ Peripheral Pulses, No cyanosis or edema  PSYCH: AAOx3  NEUROLOGY: non-focal  SKIN: No rashes or lesions

## 2019-12-10 NOTE — ED ADULT NURSE NOTE - CHPI ED NUR SYMPTOMS NEG
no hemoptysis/no body aches/no wheezing/no chest pain/no chills/no fever/no cough/no edema/no diaphoresis/no headache

## 2019-12-10 NOTE — H&P ADULT - PROBLEM SELECTOR PLAN 8
Pt is on lantus 36 units daily at home  Pt placed on sliding scale and lantus  f/u Finger sticks and Hba1c

## 2019-12-10 NOTE — H&P ADULT - NSHPREVIEWOFSYSTEMS_GEN_ALL_CORE

## 2019-12-10 NOTE — ED PROVIDER NOTE - OBJECTIVE STATEMENT
89 y.o w/ pmh of dm, htn, cad presenting with sob and cp on exertion x 3 days. denies fever, cough, cp or sob at rest, leg pain/swelling, recent travel, sick contact. endorses that he was previously able to walk down the stairs previously but in the past 3 days, noting cp when walking same stairs.

## 2019-12-11 VITALS — HEART RATE: 104 BPM

## 2019-12-11 DIAGNOSIS — I50.31 ACUTE DIASTOLIC (CONGESTIVE) HEART FAILURE: ICD-10-CM

## 2019-12-11 LAB
24R-OH-CALCIDIOL SERPL-MCNC: 12.6 NG/ML — LOW (ref 30–80)
ALBUMIN SERPL ELPH-MCNC: 2.2 G/DL — LOW (ref 3.5–5)
ALBUMIN SERPL ELPH-MCNC: 2.8 G/DL — LOW (ref 3.5–5)
ALP SERPL-CCNC: 127 U/L — HIGH (ref 40–120)
ALP SERPL-CCNC: 135 U/L — HIGH (ref 40–120)
ALT FLD-CCNC: 169 U/L DA — HIGH (ref 10–60)
ALT FLD-CCNC: 18 U/L DA — SIGNIFICANT CHANGE UP (ref 10–60)
ANION GAP SERPL CALC-SCNC: 15 MMOL/L — SIGNIFICANT CHANGE UP (ref 5–17)
ANION GAP SERPL CALC-SCNC: 9 MMOL/L — SIGNIFICANT CHANGE UP (ref 5–17)
APTT BLD: 30.6 SEC — SIGNIFICANT CHANGE UP (ref 27.5–36.3)
AST SERPL-CCNC: 15 U/L — SIGNIFICANT CHANGE UP (ref 10–40)
AST SERPL-CCNC: 155 U/L — HIGH (ref 10–40)
BASE EXCESS BLDA CALC-SCNC: -20.6 MMOL/L — LOW (ref -2–2)
BASOPHILS # BLD AUTO: 0.05 K/UL — SIGNIFICANT CHANGE UP (ref 0–0.2)
BASOPHILS NFR BLD AUTO: 0.3 % — SIGNIFICANT CHANGE UP (ref 0–2)
BILIRUB SERPL-MCNC: 0.3 MG/DL — SIGNIFICANT CHANGE UP (ref 0.2–1.2)
BILIRUB SERPL-MCNC: 0.5 MG/DL — SIGNIFICANT CHANGE UP (ref 0.2–1.2)
BLOOD GAS COMMENTS ARTERIAL: SIGNIFICANT CHANGE UP
BUN SERPL-MCNC: 72 MG/DL — HIGH (ref 7–18)
BUN SERPL-MCNC: 72 MG/DL — HIGH (ref 7–18)
CALCIUM SERPL-MCNC: 14.9 MG/DL — CRITICAL HIGH (ref 8.4–10.5)
CALCIUM SERPL-MCNC: 8.3 MG/DL — LOW (ref 8.4–10.5)
CHLORIDE SERPL-SCNC: 111 MMOL/L — HIGH (ref 96–108)
CHLORIDE SERPL-SCNC: 114 MMOL/L — HIGH (ref 96–108)
CHLORIDE UR-SCNC: 100 MMOL/L — SIGNIFICANT CHANGE UP (ref 55–125)
CHOLEST SERPL-MCNC: 115 MG/DL — SIGNIFICANT CHANGE UP (ref 10–199)
CK MB BLD-MCNC: 4.2 % — HIGH (ref 0–3.5)
CK MB BLD-MCNC: 5.8 % — HIGH (ref 0–3.5)
CK MB CFR SERPL CALC: 5 NG/ML — HIGH (ref 0–3.6)
CK MB CFR SERPL CALC: 5.9 NG/ML — HIGH (ref 0–3.6)
CK SERPL-CCNC: 102 U/L — SIGNIFICANT CHANGE UP (ref 35–232)
CK SERPL-CCNC: 118 U/L — SIGNIFICANT CHANGE UP (ref 35–232)
CO2 SERPL-SCNC: 13 MMOL/L — LOW (ref 22–31)
CO2 SERPL-SCNC: 16 MMOL/L — LOW (ref 22–31)
CREAT ?TM UR-MCNC: 38 MG/DL — SIGNIFICANT CHANGE UP
CREAT SERPL-MCNC: 4.42 MG/DL — HIGH (ref 0.5–1.3)
CREAT SERPL-MCNC: 4.81 MG/DL — HIGH (ref 0.5–1.3)
EOSINOPHIL # BLD AUTO: 0.18 K/UL — SIGNIFICANT CHANGE UP (ref 0–0.5)
EOSINOPHIL NFR BLD AUTO: 1 % — SIGNIFICANT CHANGE UP (ref 0–6)
FOLATE SERPL-MCNC: 11.2 NG/ML — SIGNIFICANT CHANGE UP
GLUCOSE SERPL-MCNC: 180 MG/DL — HIGH (ref 70–99)
GLUCOSE SERPL-MCNC: 498 MG/DL — CRITICAL HIGH (ref 70–99)
HBA1C BLD-MCNC: 6 % — HIGH (ref 4–5.6)
HCO3 BLDA-SCNC: 10 MMOL/L — LOW (ref 23–27)
HCT VFR BLD CALC: 26.9 % — LOW (ref 39–50)
HCT VFR BLD CALC: 27.1 % — LOW (ref 39–50)
HDLC SERPL-MCNC: 39 MG/DL — LOW
HGB BLD-MCNC: 8.3 G/DL — LOW (ref 13–17)
HGB BLD-MCNC: 8.8 G/DL — LOW (ref 13–17)
HOROWITZ INDEX BLDA+IHG-RTO: 100 — SIGNIFICANT CHANGE UP
IMM GRANULOCYTES NFR BLD AUTO: 0.7 % — SIGNIFICANT CHANGE UP (ref 0–1.5)
INR BLD: 1.1 RATIO — SIGNIFICANT CHANGE UP (ref 0.88–1.16)
INR BLD: 1.2 RATIO — HIGH (ref 0.88–1.16)
LIPID PNL WITH DIRECT LDL SERPL: 45 MG/DL — SIGNIFICANT CHANGE UP
LYMPHOCYTES # BLD AUTO: 52.3 % — HIGH (ref 13–44)
LYMPHOCYTES # BLD AUTO: 9.49 K/UL — HIGH (ref 1–3.3)
MAGNESIUM SERPL-MCNC: 1.7 MG/DL — SIGNIFICANT CHANGE UP (ref 1.6–2.6)
MAGNESIUM SERPL-MCNC: 2.2 MG/DL — SIGNIFICANT CHANGE UP (ref 1.6–2.6)
MCHC RBC-ENTMCNC: 30.6 GM/DL — LOW (ref 32–36)
MCHC RBC-ENTMCNC: 30.9 PG — SIGNIFICANT CHANGE UP (ref 27–34)
MCHC RBC-ENTMCNC: 30.9 PG — SIGNIFICANT CHANGE UP (ref 27–34)
MCHC RBC-ENTMCNC: 32.7 GM/DL — SIGNIFICANT CHANGE UP (ref 32–36)
MCV RBC AUTO: 100.7 FL — HIGH (ref 80–100)
MCV RBC AUTO: 94.4 FL — SIGNIFICANT CHANGE UP (ref 80–100)
MONOCYTES # BLD AUTO: 0.75 K/UL — SIGNIFICANT CHANGE UP (ref 0–0.9)
MONOCYTES NFR BLD AUTO: 4.1 % — SIGNIFICANT CHANGE UP (ref 2–14)
NEUTROPHILS # BLD AUTO: 6.98 K/UL — SIGNIFICANT CHANGE UP (ref 1.8–7.4)
NEUTROPHILS NFR BLD AUTO: 38.4 % — LOW (ref 43–77)
NRBC # BLD: 0 /100 WBCS — SIGNIFICANT CHANGE UP (ref 0–0)
OSMOLALITY UR: 359 MOS/KG — SIGNIFICANT CHANGE UP (ref 50–1200)
PCO2 BLDA: 41 MMHG — SIGNIFICANT CHANGE UP (ref 32–46)
PH BLDA: 6.99 — CRITICAL LOW (ref 7.35–7.45)
PHOSPHATE SERPL-MCNC: 4 MG/DL — SIGNIFICANT CHANGE UP (ref 2.5–4.5)
PHOSPHATE SERPL-MCNC: 8.5 MG/DL — HIGH (ref 2.5–4.5)
PLATELET # BLD AUTO: 149 K/UL — LOW (ref 150–400)
PLATELET # BLD AUTO: 155 K/UL — SIGNIFICANT CHANGE UP (ref 150–400)
PO2 BLDA: 93 MMHG — SIGNIFICANT CHANGE UP (ref 74–108)
POTASSIUM SERPL-MCNC: 5.5 MMOL/L — HIGH (ref 3.5–5.3)
POTASSIUM SERPL-MCNC: 6.2 MMOL/L — CRITICAL HIGH (ref 3.5–5.3)
POTASSIUM SERPL-SCNC: 5.5 MMOL/L — HIGH (ref 3.5–5.3)
POTASSIUM SERPL-SCNC: 6.2 MMOL/L — CRITICAL HIGH (ref 3.5–5.3)
PROT SERPL-MCNC: 4.9 G/DL — LOW (ref 6–8.3)
PROT SERPL-MCNC: 6.1 G/DL — SIGNIFICANT CHANGE UP (ref 6–8.3)
PROTHROM AB SERPL-ACNC: 12.3 SEC — SIGNIFICANT CHANGE UP (ref 10–12.9)
PROTHROM AB SERPL-ACNC: 13.4 SEC — HIGH (ref 10–12.9)
RBC # BLD: 2.69 M/UL — LOW (ref 4.2–5.8)
RBC # BLD: 2.85 M/UL — LOW (ref 4.2–5.8)
RBC # FLD: 14.7 % — HIGH (ref 10.3–14.5)
RBC # FLD: 14.9 % — HIGH (ref 10.3–14.5)
SAO2 % BLDA: 91 % — LOW (ref 92–96)
SODIUM SERPL-SCNC: 136 MMOL/L — SIGNIFICANT CHANGE UP (ref 135–145)
SODIUM SERPL-SCNC: 142 MMOL/L — SIGNIFICANT CHANGE UP (ref 135–145)
SODIUM UR-SCNC: 86 MMOL/L — SIGNIFICANT CHANGE UP (ref 40–220)
TOTAL CHOLESTEROL/HDL RATIO MEASUREMENT: 2.9 RATIO — LOW (ref 3.4–9.6)
TRIGL SERPL-MCNC: 153 MG/DL — HIGH (ref 10–149)
TROPONIN I SERPL-MCNC: 2.56 NG/ML — HIGH (ref 0–0.04)
TROPONIN I SERPL-MCNC: 2.86 NG/ML — HIGH (ref 0–0.04)
TSH SERPL-MCNC: 2.38 UU/ML — SIGNIFICANT CHANGE UP (ref 0.34–4.82)
VIT B12 SERPL-MCNC: 566 PG/ML — SIGNIFICANT CHANGE UP (ref 232–1245)
WBC # BLD: 18.16 K/UL — HIGH (ref 3.8–10.5)
WBC # BLD: 8.18 K/UL — SIGNIFICANT CHANGE UP (ref 3.8–10.5)
WBC # FLD AUTO: 18.16 K/UL — HIGH (ref 3.8–10.5)
WBC # FLD AUTO: 8.18 K/UL — SIGNIFICANT CHANGE UP (ref 3.8–10.5)

## 2019-12-11 PROCEDURE — 85730 THROMBOPLASTIN TIME PARTIAL: CPT

## 2019-12-11 PROCEDURE — 82553 CREATINE MB FRACTION: CPT

## 2019-12-11 PROCEDURE — 93306 TTE W/DOPPLER COMPLETE: CPT

## 2019-12-11 PROCEDURE — 84300 ASSAY OF URINE SODIUM: CPT

## 2019-12-11 PROCEDURE — 84443 ASSAY THYROID STIM HORMONE: CPT

## 2019-12-11 PROCEDURE — 94002 VENT MGMT INPAT INIT DAY: CPT

## 2019-12-11 PROCEDURE — 84100 ASSAY OF PHOSPHORUS: CPT

## 2019-12-11 PROCEDURE — 71046 X-RAY EXAM CHEST 2 VIEWS: CPT

## 2019-12-11 PROCEDURE — 83036 HEMOGLOBIN GLYCOSYLATED A1C: CPT

## 2019-12-11 PROCEDURE — 82607 VITAMIN B-12: CPT

## 2019-12-11 PROCEDURE — 82962 GLUCOSE BLOOD TEST: CPT

## 2019-12-11 PROCEDURE — 80048 BASIC METABOLIC PNL TOTAL CA: CPT

## 2019-12-11 PROCEDURE — 83735 ASSAY OF MAGNESIUM: CPT

## 2019-12-11 PROCEDURE — 99292 CRITICAL CARE ADDL 30 MIN: CPT | Mod: 25

## 2019-12-11 PROCEDURE — 82436 ASSAY OF URINE CHLORIDE: CPT

## 2019-12-11 PROCEDURE — 82803 BLOOD GASES ANY COMBINATION: CPT

## 2019-12-11 PROCEDURE — 85027 COMPLETE CBC AUTOMATED: CPT

## 2019-12-11 PROCEDURE — 83880 ASSAY OF NATRIURETIC PEPTIDE: CPT

## 2019-12-11 PROCEDURE — 99291 CRITICAL CARE FIRST HOUR: CPT | Mod: 25

## 2019-12-11 PROCEDURE — 99285 EMERGENCY DEPT VISIT HI MDM: CPT | Mod: 25

## 2019-12-11 PROCEDURE — 83935 ASSAY OF URINE OSMOLALITY: CPT

## 2019-12-11 PROCEDURE — 85610 PROTHROMBIN TIME: CPT

## 2019-12-11 PROCEDURE — 82570 ASSAY OF URINE CREATININE: CPT

## 2019-12-11 PROCEDURE — 80053 COMPREHEN METABOLIC PANEL: CPT

## 2019-12-11 PROCEDURE — 82550 ASSAY OF CK (CPK): CPT

## 2019-12-11 PROCEDURE — 82746 ASSAY OF FOLIC ACID SERUM: CPT

## 2019-12-11 PROCEDURE — 84484 ASSAY OF TROPONIN QUANT: CPT

## 2019-12-11 PROCEDURE — 80061 LIPID PANEL: CPT

## 2019-12-11 PROCEDURE — 36415 COLL VENOUS BLD VENIPUNCTURE: CPT

## 2019-12-11 PROCEDURE — 93005 ELECTROCARDIOGRAM TRACING: CPT

## 2019-12-11 PROCEDURE — 31500 INSERT EMERGENCY AIRWAY: CPT

## 2019-12-11 PROCEDURE — 71045 X-RAY EXAM CHEST 1 VIEW: CPT | Mod: 26,76

## 2019-12-11 PROCEDURE — 71045 X-RAY EXAM CHEST 1 VIEW: CPT

## 2019-12-11 PROCEDURE — 94640 AIRWAY INHALATION TREATMENT: CPT

## 2019-12-11 PROCEDURE — 82306 VITAMIN D 25 HYDROXY: CPT

## 2019-12-11 RX ORDER — ERYTHROPOIETIN 10000 [IU]/ML
10000 INJECTION, SOLUTION INTRAVENOUS; SUBCUTANEOUS
Refills: 0 | Status: DISCONTINUED | OUTPATIENT
Start: 2019-12-11 | End: 2019-12-11

## 2019-12-11 RX ORDER — INSULIN GLARGINE 100 [IU]/ML
25 INJECTION, SOLUTION SUBCUTANEOUS AT BEDTIME
Refills: 0 | Status: DISCONTINUED | OUTPATIENT
Start: 2019-12-11 | End: 2019-12-11

## 2019-12-11 RX ORDER — CHLORHEXIDINE GLUCONATE 213 G/1000ML
15 SOLUTION TOPICAL EVERY 12 HOURS
Refills: 0 | Status: DISCONTINUED | OUTPATIENT
Start: 2019-12-11 | End: 2019-12-11

## 2019-12-11 RX ORDER — NOREPINEPHRINE BITARTRATE/D5W 8 MG/250ML
0.05 PLASTIC BAG, INJECTION (ML) INTRAVENOUS
Qty: 16 | Refills: 0 | Status: DISCONTINUED | OUTPATIENT
Start: 2019-12-11 | End: 2019-12-11

## 2019-12-11 RX ORDER — INSULIN GLARGINE 100 [IU]/ML
20 INJECTION, SOLUTION SUBCUTANEOUS AT BEDTIME
Refills: 0 | Status: DISCONTINUED | OUTPATIENT
Start: 2019-12-11 | End: 2019-12-11

## 2019-12-11 RX ORDER — CALCIUM GLUCONATE 100 MG/ML
1 VIAL (ML) INTRAVENOUS ONCE
Refills: 0 | Status: DISCONTINUED | OUTPATIENT
Start: 2019-12-11 | End: 2019-12-11

## 2019-12-11 RX ORDER — SODIUM BICARBONATE 1 MEQ/ML
0.05 SYRINGE (ML) INTRAVENOUS
Qty: 50 | Refills: 0 | Status: DISCONTINUED | OUTPATIENT
Start: 2019-12-11 | End: 2019-12-11

## 2019-12-11 RX ORDER — CHLORHEXIDINE GLUCONATE 213 G/1000ML
1 SOLUTION TOPICAL
Refills: 0 | Status: DISCONTINUED | OUTPATIENT
Start: 2019-12-11 | End: 2019-12-11

## 2019-12-11 RX ORDER — SODIUM POLYSTYRENE SULFONATE 4.1 MEQ/G
30 POWDER, FOR SUSPENSION ORAL ONCE
Refills: 0 | Status: COMPLETED | OUTPATIENT
Start: 2019-12-11 | End: 2019-12-11

## 2019-12-11 RX ORDER — ERGOCALCIFEROL 1.25 MG/1
50000 CAPSULE ORAL
Refills: 0 | Status: DISCONTINUED | OUTPATIENT
Start: 2019-12-11 | End: 2019-12-11

## 2019-12-11 RX ADMIN — ISOSORBIDE DINITRATE 10 MILLIGRAM(S): 5 TABLET ORAL at 13:54

## 2019-12-11 RX ADMIN — SODIUM POLYSTYRENE SULFONATE 30 GRAM(S): 4.1 POWDER, FOR SUSPENSION ORAL at 14:30

## 2019-12-11 RX ADMIN — Medication 10 MILLIGRAM(S): at 13:54

## 2019-12-11 RX ADMIN — CARVEDILOL PHOSPHATE 3.12 MILLIGRAM(S): 80 CAPSULE, EXTENDED RELEASE ORAL at 06:15

## 2019-12-11 RX ADMIN — Medication 10 MILLIGRAM(S): at 06:15

## 2019-12-11 RX ADMIN — PANTOPRAZOLE SODIUM 40 MILLIGRAM(S): 20 TABLET, DELAYED RELEASE ORAL at 11:43

## 2019-12-11 RX ADMIN — HEPARIN SODIUM 5000 UNIT(S): 5000 INJECTION INTRAVENOUS; SUBCUTANEOUS at 06:14

## 2019-12-11 RX ADMIN — FINASTERIDE 5 MILLIGRAM(S): 5 TABLET, FILM COATED ORAL at 11:43

## 2019-12-11 RX ADMIN — SODIUM POLYSTYRENE SULFONATE 30 GRAM(S): 4.1 POWDER, FOR SUSPENSION ORAL at 00:19

## 2019-12-11 RX ADMIN — ATORVASTATIN CALCIUM 20 MILLIGRAM(S): 80 TABLET, FILM COATED ORAL at 00:18

## 2019-12-11 RX ADMIN — ISOSORBIDE DINITRATE 10 MILLIGRAM(S): 5 TABLET ORAL at 06:15

## 2019-12-11 RX ADMIN — Medication 4: at 13:49

## 2019-12-11 RX ADMIN — CLOPIDOGREL BISULFATE 75 MILLIGRAM(S): 75 TABLET, FILM COATED ORAL at 11:43

## 2019-12-11 RX ADMIN — POLYETHYLENE GLYCOL 3350 17 GRAM(S): 17 POWDER, FOR SOLUTION ORAL at 11:47

## 2019-12-11 RX ADMIN — CITALOPRAM 20 MILLIGRAM(S): 10 TABLET, FILM COATED ORAL at 11:43

## 2019-12-11 RX ADMIN — Medication 81 MILLIGRAM(S): at 11:43

## 2019-12-11 NOTE — CONSULT NOTE ADULT - SUBJECTIVE AND OBJECTIVE BOX
CHIEF COMPLAINT:Patient is a 89y old  Male who presents with a chief complaint of Chest pain and shortness of breath.      HPI:  Pt is a 89 yr old male from home with pmh of HTN, CAD, DM, Quadruple bypass 10 yrs ago, CAD who came in with chest pain and shortness of breath for 4-5 days. Pt states that he has been worsening breathing difficulty for last few days and today on climbing stair he became very short of breath and was brought to ED. Pt also states that he has been having episodic chest pain , pressure like, no radiation or relation with movement. Pt denies any headache, syncope, abd pain, nausea, vomiting, diarhea. (10 Dec 2019 17:22)      PAST MEDICAL & SURGICAL HISTORY:  HLD (hyperlipidemia)  Hip fracture, left  CAD (coronary artery disease): s/p by pass surgery  Diabetes  Hypertension  S/P CABG (coronary artery bypass graft)  History of hip surgery      MEDICATIONS  (STANDING):  aspirin enteric coated 81 milliGRAM(s) Oral daily  atorvastatin 20 milliGRAM(s) Oral at bedtime  carvedilol 3.125 milliGRAM(s) Oral every 12 hours  citalopram 20 milliGRAM(s) Oral daily  clopidogrel Tablet 75 milliGRAM(s) Oral daily  dextrose 50% Injectable 25 Gram(s) IV Push once  finasteride 5 milliGRAM(s) Oral daily  heparin  Injectable 5000 Unit(s) SubCutaneous every 12 hours  hydrALAZINE 10 milliGRAM(s) Oral every 8 hours  insulin lispro (HumaLOG) corrective regimen sliding scale   SubCutaneous three times a day before meals  isosorbide   dinitrate Tablet (ISORDIL) 10 milliGRAM(s) Oral three times a day  pantoprazole  Injectable 40 milliGRAM(s) IV Push daily  polyethylene glycol 3350 17 Gram(s) Oral daily  tamsulosin 0.4 milliGRAM(s) Oral at bedtime    MEDICATIONS  (PRN):  dextrose 40% Gel 15 Gram(s) Oral once PRN Blood Glucose LESS THAN 70 milliGRAM(s)/deciLiter      FAMILY HISTORY:No hx of CAD      SOCIAL HISTORY:    [x ] Non-smoker    [x ] Alcohol    Allergies    No Known Allergies    Intolerances    	    REVIEW OF SYSTEMS:  CONSTITUTIONAL: No fever, weight loss, or fatigue  EYES: No eye pain, visual disturbances, or discharge  ENT:  No difficulty hearing, tinnitus, vertigo; No sinus or throat pain  NECK: No pain or stiffness  RESPIRATORY: No cough, wheezing, chills or hemoptysis; + Shortness of Breath  CARDIOVASCULAR: + chest pain, Nopalpitations, passing out, dizziness, or leg swelling  GASTROINTESTINAL: No abdominal or epigastric pain. No nausea, vomiting, or hematemesis; No diarrhea or constipation. No melena or hematochezia.  GENITOURINARY: No dysuria, frequency, hematuria, or incontinence  NEUROLOGICAL: No headaches, memory loss, loss of strength, numbness, or tremors  SKIN: No itching, burning, rashes, or lesions   LYMPH Nodes: No enlarged glands  ENDOCRINE: No heat or cold intolerance; No hair loss  MUSCULOSKELETAL: No joint pain or swelling; No muscle, back, or extremity pain  PSYCHIATRIC: No depression, anxiety, mood swings, or difficulty sleeping  HEME/LYMPH: No easy bruising, or bleeding gums  ALLERGY AND IMMUNOLOGIC: No hives or eczema	      PHYSICAL EXAM:  T(C): 36.4 (12-11-19 @ 11:41), Max: 36.7 (12-10-19 @ 16:00)  HR: 87 (12-11-19 @ 11:41) (78 - 91)  BP: 119/52 (12-11-19 @ 11:41) (112/67 - 144/70)  RR: 18 (12-11-19 @ 11:41) (17 - 20)  SpO2: 99% (12-11-19 @ 11:41) (98% - 99%)  Wt(kg): --  I&O's Summary      Appearance: Normal	  HEENT:   Normal oral mucosa, PERRL, EOMI	  Lymphatic: No lymphadenopathy  Cardiovascular: Normal S1 S2, No JVD, No murmurs, No edema  Respiratory: Lungs clear to auscultation	  Psychiatry: A & O x 3, Mood & affect appropriate  Gastrointestinal:  Soft, Non-tender, + BS	  Skin: No rashes, No ecchymoses, No cyanosis	  Neurologic: Non-focal  Extremities: Normal range of motion, No clubbing, cyanosis or edema  Vascular: Peripheral pulses palpable 2+ bilaterally    	    ECG:  	Sinus rhythm with 1st degree A-V block  Left axis deviation  Right bundle branch block      	  LABS:	 	      CARDIAC MARKERS ( 11 Dec 2019 01:08 )  2.560 ng/mL / x     / 102 U/L / x     / 5.9 ng/mL  CARDIAC MARKERS ( 10 Dec 2019 17:57 )  2.460 ng/mL / x     / x     / x     / x      CARDIAC MARKERS ( 10 Dec 2019 11:44 )  2.000 ng/mL / x     / x     / x     / x                                  8.8    8.18  )-----------( 149      ( 11 Dec 2019 06:36 )             26.9     12-11    136  |  114<H>  |  72<H>  ----------------------------<  180<H>  5.5<H>   |  13<L>  |  4.42<H>    Ca    8.3<L>      11 Dec 2019 06:36  Phos  4.0     12-11  Mg     1.7     12-11    TPro  6.1  /  Alb  2.8<L>  /  TBili  0.5  /  DBili  x   /  AST  15  /  ALT  18  /  AlkPhos  127<H>  12-11      Lipid Profile: Cholesterol 115  LDL 45  HDL 39      HgA1c: Hemoglobin A1C, Whole Blood: 6.0 % (12-11 @ 10:13)  OBSERVATIONS:  Mitral Valve: Normal mitral valve. Moderate mitral  regurgitation.  Aortic Root: Aortic Root: 4.0 cm.    Aortic Valve: Normal trileaflet aortic valve. Mild aortic  regurgitation.  Left Atrium: Severely dilated left atrium.  LA volume index  = 51 cc/m2.  Left Ventricle: Severe global left ventricular systolic  dysfunction. Normal left ventricular internal dimensions  and wall thicknesses. Grade IV diastolic dysfunction.  Right Heart: Normal right atrium. Normal right ventricular  size with decreased RV systolic function (TAPSE 1.3cm,  tissue doppler .06m/s). There is mild-moderate tricuspid  regurgitation. Normal pulmonic valve.  Pericardium/PleuraNormal pericardium with no pericardial  effusion.  Hemodynamic: RA Pressure is 8 mm Hg. RV systolic pressure  is moderately increased at  54 mm Hg.      TSH: Thyroid Stimulating Hormone, Serum: 2.38 uU/mL (12-11 @ 06:36)      EXAM:  XR CHEST PA LAT 2V                            PROCEDURE DATE:  12/10/2019          INTERPRETATION:  Frontal and lateral chest on December 10, 2019 at 11:38   AM. Patient is short of breath.    Heart is borderline enlarged. Sternotomy again noted.    There is chronic appearing symmetric apical thickening.    On February 22 of this year there was slight atelectasis off left heart   border. This is diminished.    Present film shows a slight left base pleural pulmonary process new since   prior.    IMPRESSION: As above.

## 2019-12-11 NOTE — CONSULT NOTE ADULT - REASON FOR ADMISSION
Chest pain and shortness of breath

## 2019-12-11 NOTE — CHART NOTE - NSCHARTNOTEFT_GEN_A_CORE
At 16:22 rapid response was called, later at 16:24 code blue was called, patient was noted to have PEA, CPR initiated  he was given epinephrine x 2 along with calcium chloride push given hyperkalemia on last blood work. ROSC was achieved at 16:29 He was noted to have V.tach, loaded with amiodarone 150 mg push, after which he had bradycardia and atropine was given. Patient was intubated and positioning was confirmed by xray and capnography.      At 16:43 patient had second cardiac arrest, PEA was noted, he was given epinephrine x 2 along with bicarbonate. ROSC achieved at 16:47. Patient was transferred to MICU for further care.    In ICU patient was pako cardiac - 5Fr introducer was placed in RIJ by myself when he noted in asystole.   3rd code blue was called at 17:29. received epinephrine x 2, calcium gluconate x 1 and sodium bicarbonate x 1. ROSC achieved at 17:33.     Dr. Berrios advanced temp pacing catheter for transvenous pacing at bedside. Patient coded again despite pacer and pressure support with levophed. Further codes were managed as described below.     At 17:38, patient had fourth code blue, had asystole, 2 mg epinephrine was given,  ROSC achieved at 17:42.     At 17:56, patient had asystole, epinephrine x 1 , bicarbonate given. ROSC achieved at 17:59. At 18:11 patient coded again, had asystole, received epinephrine x 2, ca chloride and sodium bicarb. ROSC achieved at 18:16.       Patient's family including MARIANNE Orellana was at bedside who was updated with condition and repeated attempted of resuscitation, grave prognosis given prolonged cardiac arrested who expressed wishes to continue aggressive measures.      At 18:27 patient coded for 7th time, had asystole, CPR initiated again , he was given epinephrine x 1mg. Patient was pronounced dead at 18:30. Family was at bedside and informed about death. Denied autopsy.

## 2019-12-11 NOTE — AIRWAY PLACEMENT NOTE ADULT - POST AIRWAY PLACEMENT ASSESSMENT:
breath sounds bilateral/breath sounds equal/positive end tidal CO2 noted/skin color improved/chest excursion noted

## 2019-12-11 NOTE — PROGRESS NOTE ADULT - PROBLEM SELECTOR PLAN 2
-p/w sob on admission, elevated pro BNP, acute onset of CHF, last Echo in 2018 with no LV dysfunction  -s/p IV lasix   -Echo with HFrEF of  10-15%, with GD4DD and mod pulm htn  -cont ASA, Plavix, Statin, Coreg, Isordil and Hydralazine

## 2019-12-11 NOTE — PROGRESS NOTE ADULT - PROBLEM SELECTOR PLAN 8
-on lantus 36 units at home  -cont Lantus 25 units HS   -cont HSSC   -Hga1c- 6 -on lantus 36 units at home  -start Lantus 20 units HS and adjust dose based on clinical response   -cont HSSC   -Hga1c- 6

## 2019-12-11 NOTE — DISCHARGE NOTE FOR THE EXPIRED PATIENT - HOSPITAL COURSE
Pt was  a 89 yr old male from home with pmh of HTN, CAD, DM, Quadruple bypass 10 yrs ago, CAD who came in with chest pain and shortness of breath for 4-5 days. Pt reported that he has been having worsening breathing difficulty for last few days and gets short of breath on climbing stairs. Pt also c/o  that he has been having episodic chest pain , pressure like, no radiation or relation with movement. Pt denies any headache, syncope, abd pain, nausea, vomiting, diarrhea.     In ED, patient  was found to have elevated potassium, LOBITO on ckd, elevated cardiac enzymes and BNP. Patient was admitted for acute coronary syndrome, acute CHF and LOBITO on CKD with hyperkalemia. He was given IV cocktail for hyperkalemia, potassium improved.  Chest X-ray showed congestion, Pro BNP was elevated, patient was given IV Lasix.    Cardiologist was consulted, he was started on aspirin, Plavix, isordil, hydralazine. ECHO was performed which showed EF of 10-15%. Severely dilated left atrium.  Normal left ventricular internal dimensions and wall thicknesses . Severe global left ventricular systolic dysfunction. Grade IV diastolic dysfunction.   Nephrologist was also on board, there was no indication of hemodialysis.     At 16:22 rapid response was called, later at 16:24 code blue was called, patient was noted to have PEA, CPR initiated  he was given epinephrine x 2 along with atropine. ROSC was achieved at 16:29 He was noted to have V.tach, given amiodarone, after which he had bradycardia and atropine was given. At 16:43 patient had second cardiac arrest, PEA was noted, he was given epinephrine x 2 along with bicarbonate. ROSC achieved at 16:47.Patient was intubated, he was transferred to ICU.     In ICU, he coded again at 17:29, noted to have asystole, received epinephrine x 2, calcium gluconate x 1 and sodium bicarbonate x 1. ROSC achieved at 17:33. A transvenous pacemaker was placed.  Levophed was started. Family was informed about patient's condition and associated morbidity and mortality They wanted to keep patient FULL CODE.   At 17:38, patient had fourth code blue, had asystole, 2 mg epinephrine was given,  ROSC achieved at 17:42. At 17:56, patient had asystole, epinephrine x 1 , bicarbonate given. ROSC achieved at 17:59. At 18:11 patient coded again, had asystole, received epinephrine x 2, ca chloride and sodium bicarb. ROSC achieved at 18:16.   At 18:27 patient coded for 7th time, had asystole, CPR initiated again , he was given epinephrine x 1mg. Patient was pronounced dead at 18:30     Family was informed, refused autopsy.

## 2019-12-11 NOTE — CONSULT NOTE ADULT - ATTENDING COMMENTS
Patient was a 89 y/o male with PMH of HTN, DM, CABG 10 years ago, CKD stage 5 admitted with chief complaint of chest pain and SOB x 4-5 days. Mostly with ambulation. His renal function had deteriorated to the point of acute on chronic renal failure with  and creatinine 9.8. His potassium was >6. The patient had a code blue in the ED and was resuscitated after a pako arrhythmia. He became bradycardic again and was given atropine and intubated in the ED. After bringing him up to the ICU he had another cardiac arrest with bradycardia ( HR 40 ) and was given calcium chloride and atropine with ROSC. An introducer and pacing wire were floated with some degree of capture but very high amperage required  to achieve and not consistent. ABG showed ph 6.9 with PCO2 40 , paO2 93. Levophed and HcO3 drip started.  Despite these resuscitative efforts the patient had another cardiac arrest and . Echo today showed 10% EF with moderate mitral regurgitation which represented a change from recent echo. I reviewed all laboratory and roentgenographic data and any previous medical record from [facility] that existed. I also discussed the case with the ED attending or referring physician. Dx- cardiac arrest, NSTEMI, acute renal failure with hyperkalemia, hypercalcemia. Critical care time 90 minutes, separate from procedure time. Patient was a 89 y/o male with PMH of HTN, DM, CABG 10 years ago, CKD stage 5 admitted with chief complaint of chest pain and SOB x 4-5 days. Mostly with ambulation. His renal function had deteriorated to the point of acute on chronic renal failure with  and creatinine 9.8. His potassium was >6. The patient had a code blue in the ED and was resuscitated after a pako arrhythmia. He became bradycardic again and was given atropine and intubated in the ED. After bringing him up to the ICU he had another cardiac arrest with bradycardia ( HR 40 ) and was given calcium chloride and atropine with ROSC. An introducer and pacing wire were floated with some degree of capture but very high amperage required  to achieve and not consistent. ABG showed ph 6.9 with PCO2 40 , paO2 93. Levophed and HcO3 drip started.  Despite these resuscitative efforts the patient had another cardiac arrest and . Echo today showed 10% EF with moderate mitral regurgitation which represented a change from recent echo. I reviewed all laboratory and roentgenographic data and any previous medical record from [facility] that existed. I also discussed the case with the ED attending or referring physician. Dx- cardiac arrest, NSTEMI, acute renal failure with hyperkalemia, acute hypoxic respiratory failure, hypercalcemia. Critical care time 90 minutes, separate from procedure time.

## 2019-12-11 NOTE — PROGRESS NOTE ADULT - PROBLEM SELECTOR PLAN 4
-LOBITO on CKD, baseline Cr around 3   -s/p lasix 40mg x1   -hannon with adequate urine output   -Nephro Dr Madden   -avoid Nephrotoxins  -Mon BMP

## 2019-12-11 NOTE — CONSULT NOTE ADULT - ASSESSMENT
89 y/o old male from home with pmH of HTN, CAD, DM, Quadruple bypass 10 yrs ago, CAD who came in with chest pain and shortness of breath for 4-5 days. Pt states that he has been worsening breathing difficulty for last few days and today on climbing stair he became very short of breath and was brought to ED. Pt also states that he has been having episodic chest pain , pressure like, no radiation or relation with movement. Pt denies any headache, syncope, abd pain, nausea, vomiting, diarrhea.    Admitted to the Telemetry for Hyperkalemia, LOBITO on CKD, NSTEMI and CHF exacerbation.    At 16:25 Rapid response was called for unresponsiveness and code blue was immediately called due to no pulse.  ACLS was started. Received a total of 2 amps of Epi, Calcium Gluconate and 150mg Amiodarone and ROSC was obtained at 16:29.  HR continue to decrease to the 30's so Atropine was administered with and additional amp of Calcium Gluconate. Patient was intubated post ROSC for airway protection. However patient lost pulse around 16:43  ACLS was started with a total of 2 amps of Epi and one of Bicarbonate given. ROSC was obtained at 16:47. Patient was transferred to the ICU for further management.     Assessment:  - S/P Cardiac Arrest x2. S/P Intubation for airway protection hypoxia and Respiratory distress  - Acute Hypoxic Respiratory distress S/P Intubation  - CHF exacerbation  - LOBITO on CKD  - Hyperkalemia  - DM  - HTN  - HLD    Plan:  Neuro:  - Intubated  - Will start sedation upon arriving to ICU    CV:  - Admitted initially for chest pain and shortness of breath for 4-5 days, NSTEMI and new onset severe LV dysfunction   - S/P Cardiac arrest x2. CHF exacerbation vs Hyperkalemia. Intubated  - Check Post Cardiac arrest EKG  - Follow post cardiac arrest labs: CBC, CMP, Troponins    Pulm:  - S/P Cardiac Arrest x2. Intubated for airway protection   - C/W Ventilator support  - Follow Chest Xray post intubation      ID:  - No need for IV Abx at this time    Nephro:  -  monitor urine output   -  LOBITO on CKD stage 4 secondary to hemodynamic effect of NSTEMI with CRS.   -  Nephrology discussed with family about chronic dialysis if no improvement of renal function.   -  Hyperkalemia due to LOBITO  -  Check Post Cardiac Arrest  Labs    GI:  - npo until  - Will place NGT for feedings    Heme:  - Hemoglobin Stable during admission  - no indication for transfusion   - Follow Post cardiac arrest labs    Endo:  - target CBG < 180  - HSS while NPO    Prophy:  - C/W Heparin S/C    GOC:  - Full code as per son at bedside.     Dispo:  - monitor in the ICU. S/P Cardiac Arrest

## 2019-12-11 NOTE — CONSULT NOTE ADULT - ASSESSMENT
89 yr old male from home with pmh of HTN, CAD, DM, Quadruple bypass 10 yrs ago, CAD who came in with chest pain and shortness of breath for 4-5 days,NSTEMI and new onset severe LV dysfunction (echo 2018-nl fx)    1.Tele monitoring.  2.CAD and CHF-coreg,asa,isordil,hydralazine.  3.CRI-no ace/arb.  4.BPG-flomax.  5.DM-Insulin.  6.Check anemia profile,SPEP,occult.  7.D/W pt and family regarding ischemia eval.  8.GI and DVT prophylaxis.

## 2019-12-11 NOTE — PROGRESS NOTE ADULT - PROBLEM SELECTOR PLAN 3
-hyperkalemic on admission, in setting of LOBITO ON CKD   -s/p hyperkalemia cocktail x2 and Kayexalate 30mg  -K 5.5 this AM, refusing PO Kayexalate despite multiple attempts   -Nephro Dr Madden   -Mon BMP -hyperkalemic on admission, in setting of LOBITO ON CKD   -s/p hyperkalemia cocktail x2 and Kayexalate 30mg  -K 5.5 this AM, refusing PO Kayexalate despite multiple attempts, educated pt length via [place]  (primary RN)  regarding indication, potential risk including death, pt verbalized understanding.   -Nephro Dr Madden   -Sonja WEIR

## 2019-12-11 NOTE — CONSULT NOTE ADULT - SUBJECTIVE AND OBJECTIVE BOX
=================Interval HPI===================  - HPI:  89 y/o old male from home with pmH of HTN, CAD, DM, Quadruple bypass 10 yrs ago, CAD who came in with chest pain and shortness of breath for 4-5 days. Pt states that he has been worsening breathing difficulty for last few days and today on climbing stair he became very short of breath and was brought to ED. Pt also states that he has been having episodic chest pain , pressure like, no radiation or relation with movement. Pt denies any headache, syncope, abd pain, nausea, vomiting, diarrhea.    Admitted to the Telemetry for Hyperkalemia, LOBITO on CKD, NSTEMI and CHF exacerbation.    At 16:25 Rapid response was called for unresponsiveness and code blue was immediately called due to no pulse.  ACLS was started. Received a total of 2 amps of Epi, Calcium Gluconate and 150mg Amiodarone and ROSC was obtained at 16:29.  HR continue to decrease to the 30's so Atropine was administered with and additional amp of Calcium Gluconate. Patient was intubated post ROSC for airway protection. However patient lost pulse around 16:43  ACLS was started with a total of 2 amps of Epi and one of Bicarbonate given. ROSC was obtained at 16:47. Patient was transferred to the ICU for further management.     ================CHIEF COMPLAINT===============  Patient is a 89y old  Male who presents with a chief complaint of Chest pain and shortness of breath (11 Dec 2019 15:26)        =========INTERVAL HPI/OVERNIGHT EVENTS=========  Offers no new complaints      ============CURRENT MEDICATIONS===============    MEDICATIONS  (STANDING):  aspirin enteric coated 81 milliGRAM(s) Oral daily  atorvastatin 20 milliGRAM(s) Oral at bedtime  calcium gluconate IVPB 1 Gram(s) IV Intermittent once  carvedilol 3.125 milliGRAM(s) Oral every 12 hours  citalopram 20 milliGRAM(s) Oral daily  clopidogrel Tablet 75 milliGRAM(s) Oral daily  dextrose 50% Injectable 25 Gram(s) IV Push once  epoetin vivian Injectable 46107 Unit(s) SubCutaneous <User Schedule>  ergocalciferol 00901 Unit(s) Oral <User Schedule>  finasteride 5 milliGRAM(s) Oral daily  heparin  Injectable 5000 Unit(s) SubCutaneous every 12 hours  hydrALAZINE 10 milliGRAM(s) Oral every 8 hours  insulin lispro (HumaLOG) corrective regimen sliding scale   SubCutaneous three times a day before meals  isosorbide   dinitrate Tablet (ISORDIL) 10 milliGRAM(s) Oral three times a day  pantoprazole  Injectable 40 milliGRAM(s) IV Push daily  polyethylene glycol 3350 17 Gram(s) Oral daily  tamsulosin 0.4 milliGRAM(s) Oral at bedtime    MEDICATIONS  (PRN):  dextrose 40% Gel 15 Gram(s) Oral once PRN Blood Glucose LESS THAN 70 milliGRAM(s)/deciLiter        ============REVIEW OF SYSTEMS==================    Unable to obtain due to mental status    ================VITALS SIGNS=====================  Vital Signs Last 24 Hrs  T(C): 36.4 (11 Dec 2019 11:41), Max: 36.7 (10 Dec 2019 19:50)  T(F): 97.5 (11 Dec 2019 11:41), Max: 98 (10 Dec 2019 19:50)  HR: 50 (11 Dec 2019 17:05) (50 - 91)  BP: 147/55 (11 Dec 2019 17:05) (112/67 - 147/55)  BP(mean): 80 (11 Dec 2019 17:05) (80 - 80)  RR: 18 (11 Dec 2019 17:05) (17 - 20)  SpO2: 99% (11 Dec 2019 11:41) (98% - 99%)    ===============PHYSICAL EXAM====================    GENERAL: Intubated  HEENT: Normocephalic;  moist mucous membranes;   NECK : supple  CHEST/LUNG: Bibasilar crackles  HEART: Regular rate  ABDOMEN: Soft, Nontender, Nondistended; Bowel sounds present  EXTREMITIES: no cyanosis; no edema; no calf tenderness  NERVOUS SYSTEM:  AAOX0    ==============LABORATORIES======================  LABS:                        8.8    8.18  )-----------( 149      ( 11 Dec 2019 06:36 )             26.9     12-11    136  |  114<H>  |  72<H>  ----------------------------<  180<H>  5.5<H>   |  13<L>  |  4.42<H>    Ca    8.3<L>      11 Dec 2019 06:36  Phos  4.0     12-11  Mg     1.7     12-11    TPro  6.1  /  Alb  2.8<L>  /  TBili  0.5  /  DBili  x   /  AST  15  /  ALT  18  /  AlkPhos  127<H>  12-11    PT/INR - ( 11 Dec 2019 06:36 )   PT: 12.3 sec;   INR: 1.10 ratio         PTT - ( 11 Dec 2019 06:36 )  PTT:30.6 sec    CAPILLARY BLOOD GLUCOSE      POCT Blood Glucose.: 414 mg/dL (11 Dec 2019 16:50)  POCT Blood Glucose.: 409 mg/dL (11 Dec 2019 16:25)  POCT Blood Glucose.: 343 mg/dL (11 Dec 2019 11:52)  POCT Blood Glucose.: 183 mg/dL (11 Dec 2019 08:26)      =============INPUTS/OUPUTS=====================        RADIOLOGY & ADDITIONAL TESTS:    Imaging Personally Reviewed:  YES    Consultant(s) Notes Reviewed:   YES    Care Discussed with Consultants : YES    Plan of care was discussed with patient  and /or primary care giver; all questions and concerns were addressed and care was aligned with patient's wishes. Time was allowed for questions that were answered to the best of my abilities

## 2019-12-11 NOTE — PROGRESS NOTE ADULT - ASSESSMENT
89 yr old male from home admitted to [facility] for acute coronary syndrome, acute CHF and LOBITO on CKD with hyperkalemia

## 2019-12-11 NOTE — CONSULT NOTE ADULT - SUBJECTIVE AND OBJECTIVE BOX
HPI:  Pt is a 89 yr old male from home with pmh of HTN, CAD, DM, Quadruple bypass 10 yrs ago, CAD who came in with chest pain and shortness of breath for 4-5 days. Pt states that he has been worsening breathing difficulty for last few days and today on climbing stair he became very short of breath and was brought to ED. Pt also states that he has been having episodic chest pain , pressure like, no radiation or relation with movement. Pt denies any headache, syncope, abd pain, nausea, vomiting, diarhea. Baseline serum creatinine is 3.0      PMH:   HLD (hyperlipidemia)  Hip fracture, left  CAD (coronary artery disease)  Diabetes  Hypertension  CKD stage 4    PSH:   S/P CABG (coronary artery bypass graft)  History of hip surgery    FAMILY HISTORY: NC    Social History:  non-smoker/ non-alcoholic     Home Meds:  MEDICATIONS  (STANDING):  aspirin enteric coated 81 milliGRAM(s) Oral daily  atorvastatin 20 milliGRAM(s) Oral at bedtime  carvedilol 3.125 milliGRAM(s) Oral every 12 hours  citalopram 20 milliGRAM(s) Oral daily  clopidogrel Tablet 75 milliGRAM(s) Oral daily  dextrose 50% Injectable 25 Gram(s) IV Push once  ergocalciferol 72057 Unit(s) Oral <User Schedule>  finasteride 5 milliGRAM(s) Oral daily  heparin  Injectable 5000 Unit(s) SubCutaneous every 12 hours  hydrALAZINE 10 milliGRAM(s) Oral every 8 hours  insulin glargine Injectable (LANTUS) 20 Unit(s) SubCutaneous at bedtime  insulin lispro (HumaLOG) corrective regimen sliding scale   SubCutaneous three times a day before meals  isosorbide   dinitrate Tablet (ISORDIL) 10 milliGRAM(s) Oral three times a day  pantoprazole  Injectable 40 milliGRAM(s) IV Push daily  polyethylene glycol 3350 17 Gram(s) Oral daily  tamsulosin 0.4 milliGRAM(s) Oral at bedtime    MEDICATIONS  (PRN):  dextrose 40% Gel 15 Gram(s) Oral once PRN Blood Glucose LESS THAN 70 milliGRAM(s)/deciLiter      Allergies:  No Known Allergies    REVIEW OF SYSTEMS:    CONSTITUTIONAL: No fever or chills  EYES: No eye pain or discharge  ENMT:  No throat pain  NECK: No pain or stiffness  RESPIRATORY: No cough. Positive for shortness of breath  CARDIOVASCULAR: Positive for chest pain  GASTROINTESTINAL: No abdominal or epigastric pain. No nausea, vomiting, or diarrhea  GENITOURINARY: No dysuria, frequency, hematuria, or incontinence  NEUROLOGICAL: No headaches  SKIN: No itching, burning, rashes    Vital Signs Last 24 Hrs  T(C): 36.4 (11 Dec 2019 11:41), Max: 36.7 (10 Dec 2019 16:00)  T(F): 97.5 (11 Dec 2019 11:41), Max: 98 (10 Dec 2019 16:00)  HR: 87 (11 Dec 2019 11:41) (78 - 91)  BP: 119/52 (11 Dec 2019 11:41) (112/67 - 144/70)  BP(mean): 77 (10 Dec 2019 16:00) (77 - 77)  RR: 18 (11 Dec 2019 11:41) (17 - 20)  SpO2: 99% (11 Dec 2019 11:41) (98% - 99%)    PHYSICAL EXAM:    GENERAL: NAD, well-nourished, well-developed  HEAD:  Atraumatic, Normocephalic  EYES: Sclera anicteric  ENMT: Moist mucous membranes  NECK: Supple, No JVD  NERVOUS SYSTEM:  Alert & Oriented X3  CHEST/LUNG: Clear to auscultation  HEART: Regular rate and rhythm; No murmurs  ABDOMEN: Soft, Nontender, Nondistended; Bowel sounds present  EXTREMITIES:  No edema  SKIN: Normal turgor    LABS:                        8.8    8.18  )-----------( 149      ( 11 Dec 2019 06:36 )             26.9     12-11    136  |  114<H>  |  72<H>  ----------------------------<  180<H>  5.5<H>   |  13<L>  |  4.42<H>    Ca    8.3<L>      11 Dec 2019 06:36  Phos  4.0     12-11  Mg     1.7     12-11    TPro  6.1  /  Alb  2.8<L>  /  TBili  0.5  /  DBili  x   /  AST  15  /  ALT  18  /  AlkPhos  127<H>  12-11    PT/INR - ( 11 Dec 2019 06:36 )   PT: 12.3 sec;   INR: 1.10 ratio    PTT - ( 11 Dec 2019 06:36 )  PTT:30.6 sec    Vitamin D, 25-Hydroxy: 12.6 ng/mL (12-11 @ 10:31)  Magnesium, Serum: 1.7 mg/dL (12-11 @ 06:36)  Phosphorus Level, Serum: 4.0 mg/dL (12-11 @ 06:36)    ASSESSMENT AND PLAN:  1. LOBITO secondary to hemodynamic effect of NSTEMI with CRS. Discussed with patient about chronic dialysis if no improvement of renal function.   2. Hyperkalemia due to LOBITO. There is no indication for emergent HD  3. CKD stage 4  4. Anemia due to CKD  5. NSTEMI as per cardio  2 g K renal diet. Kayexalate PRN  Start Epogen  Keep patient euvolemic and renal diet  Avoid Nephrotoxic Meds/ Agents such as NSAIDs, Gadolinium contrast, Phosphate containing enemas, etc..)  Adjust Medications according to eGFR  If no improvement of renal function AVF should be created  Obtain TSAT, PTH, Renal US  Follow BMP, H/H  Many thanks

## 2020-04-27 NOTE — ED PROVIDER NOTE - PATIENT PORTAL LINK FT
DEPRESSED
You can access the FollowMyHealth Patient Portal offered by [facility] by registering at the following website: [email or web address]. By joining [employer]’s FollowMyHealth portal, you will also be able to view your health information using other applications (apps) compatible with our system.

## 2021-01-24 NOTE — PHYSICAL THERAPY INITIAL EVALUATION ADULT - PLANNED THERAPY INTERVENTIONS, PT EVAL
Pt ambulatory to bathroom with steady gait.  Urine cup provided for specimen collection     Lidia Fiore RN  01/24/21 2917 bed mobility training/strengthening/gait training/transfer training

## 2021-03-26 NOTE — ED ADULT NURSE NOTE - NS ED NURSE RECORD ANOTHER HT AND WT
Group Topic: BH Process Group     Date: 3/26/2021  Start Time:  9:00 AM  End Time: 10:00 AM  Facilitators: Laurel Carcamo RN; Lefty Louie LCSW    Mood ratin/100  Physical Feeling: awake  Emotional feeling: Uncertain/have interview today  Program goal: be mindful and present    Method: Group  Attendance: Present  Participation: Active  Patient Response: Attentive  Mood: Depressed  Affect: Type: Depressed   Range: Full (normal)   Congruency: Congruent   Stability: Stable  Behavior/Socialization: Appropriate to group and Cooperative  Thought Process: Focused  Task Performance: Follows directions  Patient Evaluation: Encouragement - needs prompts     Yes

## 2021-08-27 NOTE — ED PROVIDER NOTE - MUSCULOSKELETAL, MLM
PT DAILY TREATMENT NOTE     Patient Name: Cristela Briscoe  Date:2021  : 1987  [x]  Patient  Verified  Payor: Amarilys Li / Plan: Asha Hamden / Product Type: HMO /    In NRQL:[id number]  Out time:0820  Total Treatment Time (min): 35  Visit #: 1 of 4-8    Medicare/BCBS Only   Total Timed Codes (min):  15 1:1 Treatment Time:  35       Treatment Area: Low back pain [M54.5]    SUBJECTIVE  Pain Level (0-10 scale): 2-3  Any medication changes, allergies to medications, adverse drug reactions, diagnosis change, or new procedure performed?: [x] No    [] Yes (see summary sheet for update)  Subjective functional status/changes:   [] No changes reported       OBJECTIVE    Modality rationale:    Min Type Additional Details    [] Estim:  []Unatt       []IFC  []Premod                        []Other:  []w/ice   []w/heat  Position:  Location:    [] Estim: []Att    []TENS instruct  []NMES                    []Other:  []w/US   []w/ice   []w/heat  Position:  Location:    []  Traction: [] Cervical       []Lumbar                       [] Prone          []Supine                       []Intermittent   []Continuous Lbs:  [] before manual  [] after manual    []  Ultrasound: []Continuous   [] Pulsed                           []1MHz   []3MHz W/cm2:  Location:    []  Iontophoresis with dexamethasone         Location: [] Take home patch   [] In clinic    []  Ice     []  heat  []  Ice massage  []  Laser   []  Anodyne Position:  Location:    []  Laser with stim  []  Other:  Position:  Location:    []  Vasopneumatic Device    []  Right     []  Left  Pre-treatment girth:  Post-treatment girth:  Measured at (location):  Pressure:       [] lo [] med [] hi   Temperature: [] lo [] med [] hi   [] Skin assessment post-treatment:  []intact []redness- no adverse reaction    []redness  adverse reaction:     20 min [x]Eval                  []Re-Eval       15 min Therapeutic Exercise:  [] See flow sheet : HEP   Rationale: increase ROM and increase strength to improve the patients ability to perform ADL            With   [] TE   [] TA   [] neuro   [] other: Patient Education: [x] Review HEP    [] Progressed/Changed HEP based on:   [] positioning   [] body mechanics   [] transfers   [] heat/ice application    [] other:      Other Objective/Functional Measures:       Pain Level (0-10 scale) post treatment: 2-3    ASSESSMENT/Changes in Function:      Patient will continue to benefit from skilled PT services to modify and progress therapeutic interventions, address functional mobility deficits, address ROM deficits, address strength deficits, analyze and address soft tissue restrictions, analyze and cue movement patterns, analyze and modify body mechanics/ergonomics and assess and modify postural abnormalities to attain remaining goals. [x]  See Plan of Care  []  See progress note/recertification  []  See Discharge Summary         Progress towards goals / Updated goals:  Short Term Goals: To be accomplished in 1 weeks:   1. The pt will be I and compliant with HEP   IE issued HEP  Long Term Goals: To be accomplished in 4 weeks:   1. Improve FOTO score to 69 for improved ability for daily tasks   IE- 56   2. The pt will report no limitation with performing moderate activities like pushing a vacuum. IE- a little limited   3. The pt will demonstrate 4+/5 B glute max MMT to improve ability for daily tasks. IE- 4-/5 B   4. The pt will demonstrate ability to maintain neutral spine with performing lumbar stabs.     IE- poor core engagement    PLAN  []  Upgrade activities as tolerated     [x]  Continue plan of care  []  Update interventions per flow sheet       []  Discharge due to:_  []  Other:_      Hipolito Almendarez PT 8/27/2021  7:41 AM    Future Appointments   Date Time Provider Shamika Falk   8/27/2021  7:45 AM Jesus Harrington, PT [facility] Spine appears normal, range of motion is not limited, no muscle or joint tenderness

## 2022-01-15 NOTE — ED PROVIDER NOTE - CPE EDP CARDIAC NORM
A generator pocket was opened at the left  chest with blunt dissection, electrocautery, plasma blade and sharp dissection.  normal...

## 2022-05-28 NOTE — H&P ADULT - PROBLEM SELECTOR PROBLEM 6
Patient is not pregnant (male or female)
Coronary artery disease involving coronary bypass graft of native heart without angina pectoris

## 2022-07-29 NOTE — DISCHARGE NOTE ADULT - NSFTFHOMEHTHYNRD_GEN_ALL_CORE
IR right pcn tube check today  Dr Arevalo confirmed placement  No intervention today   Plan is for patient to follow up 8/31/2022 for routine tube change Yes

## 2022-08-07 NOTE — ED ADULT TRIAGE NOTE - WEIGHT IN KG
Problem: SLP  Goal: SLP Goal  Description: STGs:  1. Pt will participate in clinical swallow assessment to determine safest diet level. - Met   2. Pt will safely tolerate a Mechanical Soft/Thin liquid diet without any overt s/sx of aspiration. - MET  Outcome: Met     8/7/22: Pt seen this AM to ensure safety with rec'd diet/swallow precautions. Pt tolerated all PO trials given of thin liquids and soft solids -- fairly timely mastication and AP transit with good oral clearance and no overt s/s of aspiration seen at bedside. Pt also demonstrating intact  speech-language and cognitive skills, per subjective observation. SLP will sign off at this time, as skilled ST services are no longer warranted. Please reconsult SLP should pt experience any change in status.     SLP recs: Continue Dysphagia level 5 Mechanical Soft and Thin Liquids PO diet with with the following swallow precautions:  - Whole meds 1 by 1   - Alternate sips/bites  - Slow rate of intake   - Upright, awake and alert for ALL PO intake and remain upright for 30 mins s/p meals   - Discontinue if overt s/s of aspiration are noted (coughing, choking, red in the face, watery eyes, dcr'd respiratory support, wet vocal quality, etc.)     59

## 2022-09-02 NOTE — PROGRESS NOTE ADULT - ASSESSMENT
Monitor for changes. 1.s/p hip surgery  post op care as per ortho  dvt prophylaxis  2.arf  iv fluids   hannon  renal evel dr cartwright/d/w him  sono    3diabetes  sugars elevated  adjust insulin  see orders  4.anemia  replace slowly  5.cad  as per cardio 1.s/p hip surgery  post op care as per ortho  dvt prophylaxis  2.arf  iv fluids   hannon  renal evel dr cartwright/d/w him  sono  3/r/o ileus  axr  npo till sbo ru;ed out  iv fluids  3diabetes  sugars elevated  adjust insulin  see orders  4.anemia  replace slowly  5.cad  as per cardio

## 2022-09-14 NOTE — PHYSICAL THERAPY INITIAL EVALUATION ADULT - ACTIVE RANGE OF MOTION EXAMINATION, REHAB EVAL
except hip flexion. Unable to assess due to pain./renaldo. upper extremity Active ROM was WNL (within normal limits)/bilateral  lower extremity Active ROM was WFL (within functional limits) Graft Cartilage Fenestration Text: The cartilage was fenestrated with a 2mm punch biopsy to help facilitate graft survival and healing.

## 2022-12-26 NOTE — ED ADULT NURSE NOTE - EXTENSIONS OF SELF_ADULT
-- DO NOT REPLY / DO NOT REPLY ALL --  -- Message is from Engagement Center Operations (ECO) --    General Patient Message: Patient daughter requesting a call back as patient is a schedule for tomorrow appointment  Patient is now on hospice by doctor should she cancelled the appointment      Caller Information       Type Contact Phone/Fax    12/26/2022 04:04 PM CST Phone (Incoming) PETE SKY (Emergency Contact) [phone]        Alternative phone number: [phone]    Can a detailed message be left? Yes    Message Turnaround:     Is it Working Hours? Yes - Working Hours     [place]:    Please give this turnaround time to the caller:   \"This message will be sent to [state Provider's name]. The clinical team will fulfill your request as soon as they review your message.\"                
None

## 2023-01-09 NOTE — ED PROVIDER NOTE - CAPILLARY REFILL
less than 2 seconds Manual Repair Warning Statement: We plan on removing the manually selected variable below in favor of our much easier automatic structured text blocks found in the previous tab. We decided to do this to help make the flow better and give you the full power of structured data. Manual selection is never going to be ideal in our platform and I would encourage you to avoid using manual selection from this point on, especially since I will be sunsetting this feature. It is important that you do one of two things with the customized text below. First, you can save all of the text in a word file so you can have it for future reference. Second, transfer the text to the appropriate area in the Library tab. Lastly, if there is a flap or graft type which we do not have you need to let us know right away so I can add it in before the variable is hidden. No need to panic, we plan to give you roughly 6 months to make the change.

## 2023-01-24 NOTE — ED ADULT TRIAGE NOTE - CHIEF COMPLAINT QUOTE
Eat a low-sodium diet  Exercise  Increase metoprolol to 50 mg daily  Continue lisinopril 10 mg daily  Follow-up in 1 month  Continue checking your blood pressure at home    [facility] Laboratory Locations - No appointment necessary. @ indicates the location is open  in addition to Monday through Friday. Call your preferred location for test preparation, business hours and other information you need. [employer] accepts [employer]. [facility]     @ [address] [facility] [address] [facility]. [place], [address] [place] [address] Ave    Ph: [address]   [address] [facility], [address]    Ph: [phone]   @ [address] [place] [address].[facility]    Ph: [address]    Ph: [phone]  @ [address] [place] [address]   Ph: [phone]  @ [address] [place] [address]. [address] [place] [address]    Ph: [address] [place] [address] 19   Ph: [phone]    [place]    @ [facility]. [place], [place] [address]    Ph: [phone]  [place] [address] h[address] [facility], [address]   Ph: [address]. [address]: [address] [facility] [address]    Ph: [phone]   [facility]   [address]  [facility]. Gurmeet Moser   Ph: [address] [facility]  [address] [place], [place] [address]    Ph: [phone]
R hip pain, h/o r hip surgery on 3/25/2018

## 2023-08-15 NOTE — ED PROVIDER NOTE - CPE EDP MUSC NORM
- - - Instructed for patient to take Clozapine and Divalproex- DR with a sip of water on the morning of procedure.

## 2023-11-22 NOTE — H&P ADULT - MENTAL STATUS
alert Patient Specific Otc Recommendations (Will Not Stick From Patient To Patient): Differin 0.1% gel: apply to face once at night, twice a week. On Monday and Thursday. Detail Level: Zone

## 2024-03-08 NOTE — ED ADULT NURSE NOTE - WHEN FALL OCCURRED
Please see the imaging tab for details of the ultrasound performed today.    Pili Smith MD  Specialist in Maternal-Fetal Medicine    
this admission

## 2024-05-01 NOTE — PROGRESS NOTE ADULT - PROBLEM SELECTOR PROBLEM 3
Anemia, chronic disease
CKD (chronic kidney disease)
Detail Level: Detailed

## 2024-10-30 NOTE — PROGRESS NOTE ADULT - SUBJECTIVE AND OBJECTIVE BOX
89 yr old male from home with pmh of HTN, CAD, DM, Quadruple bypass 10 yrs ago, CAD who came in with chest pain and shortness of breath at home, found to be hypernatremic, CHF exacerbation and LOBITO on CKD abd ACS, s/p Iv lasix. cardio Dr. Johnson consulted. Echo with HFrEF of  10-15%, with GD4DD and mod pulm htn. Nephro Dr. Madden consulted   hyperkalemia slightly improved, s/p cocktail x2, refusing PO Kayexalate despite multiple attempts      OVERNIGHT EVENTS: hyperkalemic, offering no new complaints        REVIEW OF SYSTEMS:  CONSTITUTIONAL: No fever, chills  NECK: No pain or stiffness  RESPIRATORY: No cough, SOB  CARDIOVASCULAR: No chest pain, palpitations  GASTROINTESTINAL: No abdominal pain. No nausea, vomiting, or diarrhea  GENITOURINARY: No dysuria  NEUROLOGICAL: No HA  MUSCULOSKELETAL: No joint pain or swelling; No muscle, back, or extremity pain    T(C): 36.4 (12-11-19 @ 11:41), Max: 36.7 (12-10-19 @ 16:00)  HR: 87 (12-11-19 @ 11:41) (78 - 91)  BP: 119/52 (12-11-19 @ 11:41) (112/67 - 144/70)  RR: 18 (12-11-19 @ 11:41) (17 - 20)  SpO2: 99% (12-11-19 @ 11:41) (98% - 99%)  Wt(kg): --Vital Signs Last 24 Hrs  T(C): 36.4 (11 Dec 2019 11:41), Max: 36.7 (10 Dec 2019 16:00)  T(F): 97.5 (11 Dec 2019 11:41), Max: 98 (10 Dec 2019 16:00)  HR: 87 (11 Dec 2019 11:41) (78 - 91)  BP: 119/52 (11 Dec 2019 11:41) (112/67 - 144/70)  BP(mean): 77 (10 Dec 2019 16:00) (77 - 77)  RR: 18 (11 Dec 2019 11:41) (17 - 20)  SpO2: 99% (11 Dec 2019 11:41) (98% - 99%)    MEDICATIONS  (STANDING):  aspirin enteric coated 81 milliGRAM(s) Oral daily  atorvastatin 20 milliGRAM(s) Oral at bedtime  carvedilol 3.125 milliGRAM(s) Oral every 12 hours  citalopram 20 milliGRAM(s) Oral daily  clopidogrel Tablet 75 milliGRAM(s) Oral daily  dextrose 50% Injectable 25 Gram(s) IV Push once  finasteride 5 milliGRAM(s) Oral daily  heparin  Injectable 5000 Unit(s) SubCutaneous every 12 hours  hydrALAZINE 10 milliGRAM(s) Oral every 8 hours  insulin lispro (HumaLOG) corrective regimen sliding scale   SubCutaneous three times a day before meals  isosorbide   dinitrate Tablet (ISORDIL) 10 milliGRAM(s) Oral three times a day  pantoprazole  Injectable 40 milliGRAM(s) IV Push daily  polyethylene glycol 3350 17 Gram(s) Oral daily  tamsulosin 0.4 milliGRAM(s) Oral at bedtime    MEDICATIONS  (PRN):  dextrose 40% Gel 15 Gram(s) Oral once PRN Blood Glucose LESS THAN 70 milliGRAM(s)/deciLiter      PHYSICAL EXAM:  GENERAL: NAD  ENMT: Moist mucous membranes  CHEST/LUNG: Clear to auscultation bilaterally; No rales, rhonchi, wheezing, or rubs  HEART: S1, S2, Regular rate and rhythm  ABDOMEN: Soft, Nontender, Nondistended; Bowel sounds present  NEURO: Alert & Oriented X3  EXTREMITIES: No LE edema, no calf tenderness  : [place]     Consultant(s) Notes Reviewed:  [x ] YES  [ ] NO  Care Discussed with Consultants/Other Providers [ x] YES  [ ] NO    LABS:                        8.8    8.18  )-----------( 149      ( 11 Dec 2019 06:36 )             26.9     12-11    136  |  114<H>  |  72<H>  ----------------------------<  180<H>  5.5<H>   |  13<L>  |  4.42<H>    Ca    8.3<L>      11 Dec 2019 06:36  Phos  4.0     12-11  Mg     1.7     12-11    TPro  6.1  /  Alb  2.8<L>  /  TBili  0.5  /  DBili  x   /  AST  15  /  ALT  18  /  AlkPhos  127<H>  12-11    PT/INR - ( 11 Dec 2019 06:36 )   PT: 12.3 sec;   INR: 1.10 ratio         PTT - ( 11 Dec 2019 06:36 )  PTT:30.6 sec    CAPILLARY BLOOD GLUCOSE      POCT Blood Glucose.: 343 mg/dL (11 Dec 2019 11:52)  POCT Blood Glucose.: 183 mg/dL (11 Dec 2019 08:26)    RADIOLOGY & ADDITIONAL TESTS:    < from: Xray Chest 2 Views PA/Lat (12.10.19 @ 11:56) >    EXAM:  XR CHEST PA LAT 2V                            PROCEDURE DATE:  12/10/2019          INTERPRETATION:  Frontal and lateral chest on December 10, 2019 at 11:38   AM. Patient is short of breath.    Heart is borderline enlarged. Sternotomy again noted.    There is chronic appearing symmetric apical thickening.    On February 22 of this year there was slight atelectasis off left heart   border. This is diminished.    Present film shows a slight left base pleural pulmonary process new since   prior.    IMPRESSION: As above.      < end of copied text >    < from: Transthoracic Echocardiogram (12.10.19 @ 12:48) >  ------------------------------------------------------------------------  CONCLUSIONS:  1. Normal mitral valve. Moderate mitral regurgitation.  2. Normal trileaflet aortic valve. Mild aortic  regurgitation.  3. Aortic Root: 4.0 cm.    4. Severely dilated left atrium.  LA volume index = 51  cc/m2.  5. Normal left ventricular internal dimensions and wall  thicknesses.  6. Severe global left ventricular systolic dysfunction.  7. Grade IV diastolic dysfunction.  8. Normal right atrium.  9. Normal right ventricular size with decreased RV systolic  function (TAPSE 1.3cm, tissue doppler .06m/s).  10. RA Pressure is 8 mm Hg.  11. RV systolic pressure is moderately increased at  54 mm  Hg.  12. There is mild-moderate tricuspid regurgitation.  13. Normal pulmonic valve.  14. Normal pericardium with no pericardial effusion.      < end of copied text >      Imaging Personally Reviewed:  [ ] YES  [ ] NO
until cleared by your surgeon

## 2025-04-03 NOTE — ED ADULT NURSE NOTE - INTERPRETATION SERVICES DECLINED
Physical Therapy  Facility/Department: [address] INTERMEDIATE 1  Daily Treatment Note  NAME: Paul Pemberton  : 1930  MRN: [id number]    Date of Service: 4/3/2025       Patient Diagnosis(es): The primary encounter diagnosis was Hypernatremia. Diagnoses of Hyperkalemia and REGINALDO (acute kidney injury) were also pertinent to this visit.  Past Medical History:  has a past medical history of Allergic rhinitis, Diabetic neuropathy (HCC), Hyperlipidemia, Hypertension, Osteoarthritis, and Type 2 diabetes mellitus without complication (HCC).  Past Surgical History:  has a past surgical history that includes Urethra dilation.        Evaluating Therapist: Trista Brar PT     Room #:  0441/0441-A  Diagnosis:  Hyperkalemia [E87.5]  Hypernatremia [E87.0]  REGINALDO (acute kidney injury) [N17.9]  PMHx/PSHx:  Diabetic neuropathy, HTN, DM, OA  Precautions:  falls, alarm     Social:  Pt admitted from [facility]. Unable to report level of function at [facility]. States he stays in bed       Initial Evaluation  Date: 3/28/25 Treatment      Short Term/ Long Term   Goals   Was pt agreeable to Eval/treatment? With encouragement and pt only willing to minimally participate  Only willing to roll and reposition in bed      Does pt have pain? None reported No pain reported just states he does not feel well      Bed Mobility  Rolling: max assist  Supine <> long max assist of 2  Scooting: max assist  rolling dependent  Scooting dependent  Supine<> long sit dependent Mod assist   Transfers Sit to stand: NT  Stand to sit: NT  Stand pivot: NT   NT Max assist   Ambulation    NT  NT N/A   Stair Negotiation  Ascended and descended  NT  NT  N/A   LE strength     Grossly 2/5     3   balance              AM-PAC Raw score                         Patient education  Pt was educated on importance of mobility    Patient response to education:   Pt verbalized understanding Pt demonstrated skill Pt requires further education in this area   no no yes     Additional  Comments: Pt only agreed to move to reposition in bed.  Resistive to mobility and easily agitated. PROM B LE's during repositioning..     Pt was left in bed with call light left by patient.    Chair/bed alarm: yes    Time in: 0850  Time out: 0903    Total treatment time 13 minutes    Pt is making poor progress toward established Physical Therapy goals.  Continue with physical therapy current plan of care.    Trista Brar PT [id number]      CPT codes:  [] Low Complexity PT evaluation 56557  [] Moderate Complexity PT evaluation 31778  [] High Complexity PT evaluation 38551  [] PT Re-evaluation 70594  [] Gait training 82492  minutes  [] Manual therapy 56507    minutes  [x] Therapeutic activities 53581  13  minutes  [] Therapeutic exercises 55717     minutes  [] Neuromuscular reeducation 37262     minutes        Patient/Caregiver requests family/friend to interpret.